# Patient Record
Sex: MALE | Race: BLACK OR AFRICAN AMERICAN | NOT HISPANIC OR LATINO | URBAN - METROPOLITAN AREA
[De-identification: names, ages, dates, MRNs, and addresses within clinical notes are randomized per-mention and may not be internally consistent; named-entity substitution may affect disease eponyms.]

---

## 2015-03-17 RX ORDER — INSULIN GLARGINE 100 [IU]/ML
120 INJECTION, SOLUTION SUBCUTANEOUS
Qty: 0 | Refills: 0 | COMMUNITY
Start: 2015-03-17

## 2015-03-17 RX ORDER — CYCLOBENZAPRINE HYDROCHLORIDE 10 MG/1
1 TABLET, FILM COATED ORAL
Qty: 0 | Refills: 0 | COMMUNITY
Start: 2015-03-17

## 2015-03-18 RX ORDER — FUROSEMIDE 40 MG
1 TABLET ORAL
Qty: 0 | Refills: 0 | COMMUNITY
Start: 2015-03-18

## 2018-03-09 ENCOUNTER — INPATIENT (INPATIENT)
Facility: HOSPITAL | Age: 73
LOS: 14 days | DRG: 208 | End: 2018-03-24
Attending: INTERNAL MEDICINE | Admitting: INTERNAL MEDICINE
Payer: MEDICARE

## 2018-03-09 VITALS
OXYGEN SATURATION: 98 % | DIASTOLIC BLOOD PRESSURE: 111 MMHG | HEART RATE: 123 BPM | RESPIRATION RATE: 16 BRPM | SYSTOLIC BLOOD PRESSURE: 183 MMHG

## 2018-03-09 DIAGNOSIS — R41.82 ALTERED MENTAL STATUS, UNSPECIFIED: ICD-10-CM

## 2018-03-09 LAB
ALBUMIN SERPL ELPH-MCNC: 3.6 G/DL — SIGNIFICANT CHANGE UP (ref 3.3–5.2)
ALP SERPL-CCNC: 105 U/L — SIGNIFICANT CHANGE UP (ref 40–120)
ALT FLD-CCNC: 23 U/L — SIGNIFICANT CHANGE UP
ANION GAP SERPL CALC-SCNC: 14 MMOL/L — SIGNIFICANT CHANGE UP (ref 5–17)
APTT BLD: 31.8 SEC — SIGNIFICANT CHANGE UP (ref 27.5–37.4)
AST SERPL-CCNC: 37 U/L — SIGNIFICANT CHANGE UP
BASOPHILS # BLD AUTO: 0.1 K/UL — SIGNIFICANT CHANGE UP (ref 0–0.2)
BASOPHILS NFR BLD AUTO: 0.6 % — SIGNIFICANT CHANGE UP (ref 0–2)
BILIRUB SERPL-MCNC: 1 MG/DL — SIGNIFICANT CHANGE UP (ref 0.4–2)
BUN SERPL-MCNC: 55 MG/DL — HIGH (ref 8–20)
CALCIUM SERPL-MCNC: 9.4 MG/DL — SIGNIFICANT CHANGE UP (ref 8.6–10.2)
CHLORIDE SERPL-SCNC: 88 MMOL/L — LOW (ref 98–107)
CK MB CFR SERPL CALC: 6.8 NG/ML — HIGH (ref 0–6.7)
CK SERPL-CCNC: 176 U/L — SIGNIFICANT CHANGE UP (ref 30–200)
CO2 SERPL-SCNC: 29 MMOL/L — SIGNIFICANT CHANGE UP (ref 22–29)
CREAT SERPL-MCNC: 2.49 MG/DL — HIGH (ref 0.5–1.3)
EOSINOPHIL # BLD AUTO: 0.4 K/UL — SIGNIFICANT CHANGE UP (ref 0–0.5)
EOSINOPHIL NFR BLD AUTO: 4 % — SIGNIFICANT CHANGE UP (ref 0–5)
GAS PNL BLDA: SIGNIFICANT CHANGE UP
GLUCOSE BLDC GLUCOMTR-MCNC: 408 MG/DL — HIGH (ref 70–99)
GLUCOSE SERPL-MCNC: 445 MG/DL — HIGH (ref 70–115)
HCT VFR BLD CALC: 55.5 % — HIGH (ref 42–52)
HGB BLD-MCNC: 18.8 G/DL — HIGH (ref 14–18)
INR BLD: 1.08 RATIO — SIGNIFICANT CHANGE UP (ref 0.88–1.16)
LYMPHOCYTES # BLD AUTO: 2.4 K/UL — SIGNIFICANT CHANGE UP (ref 1–4.8)
LYMPHOCYTES # BLD AUTO: 25.5 % — SIGNIFICANT CHANGE UP (ref 20–55)
MCHC RBC-ENTMCNC: 29.4 PG — SIGNIFICANT CHANGE UP (ref 27–31)
MCHC RBC-ENTMCNC: 33.9 G/DL — SIGNIFICANT CHANGE UP (ref 32–36)
MCV RBC AUTO: 86.7 FL — SIGNIFICANT CHANGE UP (ref 80–94)
MONOCYTES # BLD AUTO: 0.8 K/UL — SIGNIFICANT CHANGE UP (ref 0–0.8)
MONOCYTES NFR BLD AUTO: 8.3 % — SIGNIFICANT CHANGE UP (ref 3–10)
NEUTROPHILS # BLD AUTO: 5.8 K/UL — SIGNIFICANT CHANGE UP (ref 1.8–8)
NEUTROPHILS NFR BLD AUTO: 60.6 % — SIGNIFICANT CHANGE UP (ref 37–73)
PLATELET # BLD AUTO: 229 K/UL — SIGNIFICANT CHANGE UP (ref 150–400)
POTASSIUM SERPL-MCNC: 5.6 MMOL/L — HIGH (ref 3.5–5.3)
POTASSIUM SERPL-SCNC: 5.6 MMOL/L — HIGH (ref 3.5–5.3)
PROT SERPL-MCNC: 7.7 G/DL — SIGNIFICANT CHANGE UP (ref 6.6–8.7)
PROTHROM AB SERPL-ACNC: 11.9 SEC — SIGNIFICANT CHANGE UP (ref 9.8–12.7)
RBC # BLD: 6.4 M/UL — HIGH (ref 4.6–6.2)
RBC # FLD: 14.6 % — SIGNIFICANT CHANGE UP (ref 11–15.6)
SODIUM SERPL-SCNC: 131 MMOL/L — LOW (ref 135–145)
TROPONIN T SERPL-MCNC: 0.07 NG/ML — HIGH (ref 0–0.06)
WBC # BLD: 9.6 K/UL — SIGNIFICANT CHANGE UP (ref 4.8–10.8)
WBC # FLD AUTO: 9.6 K/UL — SIGNIFICANT CHANGE UP (ref 4.8–10.8)

## 2018-03-09 PROCEDURE — 70450 CT HEAD/BRAIN W/O DYE: CPT | Mod: 26

## 2018-03-09 PROCEDURE — 99291 CRITICAL CARE FIRST HOUR: CPT

## 2018-03-09 PROCEDURE — 71045 X-RAY EXAM CHEST 1 VIEW: CPT | Mod: 26

## 2018-03-09 PROCEDURE — 62270 DX LMBR SPI PNXR: CPT

## 2018-03-09 PROCEDURE — 99291 CRITICAL CARE FIRST HOUR: CPT | Mod: 25

## 2018-03-09 RX ADMIN — Medication 2 MILLIGRAM(S): at 22:05

## 2018-03-09 RX ADMIN — Medication 2 MILLIGRAM(S): at 21:50

## 2018-03-09 NOTE — STROKE CODE NOTE - SUBJECTIVE
73 years old man was last seen to be normal at around 5:30 to 6 PM when he had a dinner with his wife (after she returned from her own medical testing). She reports to have a normal conversation with him at that time. After having a dinner, he laid down on the couch and probably fell asleep. At around 7 or 8 PM (she was not exactly sure about that time) when his wife went to check on him, she found him laying on the couch with eyes open but he was not responding to verbal stimuli and was not making an eye contact. After calling her son, she called 911, and he was subsequently transferred to [facility] ED for further evaluation. On repeated prompting, his wife reports that she saw him completely normal at 6 PM and did not have any conversation with him since then. He was also not reported to get up to use in the bathroom since then. As per his wife, he was not seen to be normal by anyone since 6 PM.

## 2018-03-09 NOTE — STROKE CODE NOTE - PROBLEM SELECTOR PLAN 1
Plan:  - Not a candidate for IV tPA as he was outside the time window.  - CTA head and neck to evaluate for intracranial and extracranial cerebral vasculature to look for any evidence of proximal large vessel intracranial occlusion to determine his candidacy for mechanical embolectomy. His creatinine in the ED he is reported to be 2.49. Risks versus benefits of undergoing CTA head and neck in patients with renal dysfunction including need for future hemodialysis were discussed with his wife in detail. Considering the concerns for large vessel intracranial occlusion and potential treatment in the form of mechanical embolectomy, which could only be decided based on CTA head and neck results (not possible to perform MRA due to MRI non-compatible pacemaker as per his wife), benefits for undergoing CTA head and neck would likely outweigh potential risks  - Continue with IV hydration as tolerated for renal protection and consider nephrologist consultation   - Further evaluation and management as per results of CTA head and neck  - Try to minimize sedation/mind altering medication in order to perform neurological examination  - Allow permissive HTN up to 220/110 mmHg for first 24-48 hours followed by gradual normotension   - Aspirin for secondary stroke prevention   - Atorvastatin 80 mg after establishing enteral access or passing swallow evaluation  - TTE with bubble study and telemetry to screen for possible cardiac source of embolism  - LDL and HbA1C, continue with aggressive vascular risk factors modifications   - PT/OT/Speech and swallow evaluation     Above mentioned plan was discussed with patient, available family member and [facility] ED MD in detail. All the questions were answered and concerns were addressed.

## 2018-03-09 NOTE — ED PROVIDER NOTE - NEUROLOGICAL, MLM
Focal seizures in cheek with gaze to the left. Twitching to left side of face. No withdrawal from pain.

## 2018-03-09 NOTE — STROKE CODE NOTE - OBJECTIVE
NIHSS - NIHSS - 30    Neurological examination was performed under sedation with propofol.    As per the evaluation by [facility] ED MD, his eyes were in the center without any gaze deviation. It was not possible to check for any facial droop, as he was intubated. He was noted to be briskly moving right upper extremity and right lower extremity in response to painful stimuli. No withdrawal to painful stimuli noted in left upper extremity or left lower extremity.

## 2018-03-09 NOTE — ED PROCEDURE NOTE - PROCEDURE ADDITIONAL DETAILS
First attempt preformed by Dr. Aguilar, second attempt by RT, after 3th attempt by RT, anesthesiologist was called and placed tube secondary to difficult airway.

## 2018-03-09 NOTE — ED PROVIDER NOTE - PROGRESS NOTE DETAILS
Pt wife states she came home around 1630 and found pt in bed, asleep. Per wife, pt appeared tired and became unresponsive around approximately 1830. pt evaluated by dr carter of neurology eicu ; pt not a candidate for mri because of pacemaker; pt to have ct angio of neck and head for evaluation for possible interventional procedure; case discussed with wife who agrees consent for ct angio of head and neck ; risks and benefits discussed with family including worsening kidney function;

## 2018-03-09 NOTE — ED ADULT NURSE REASSESSMENT NOTE - NS ED NURSE REASSESS COMMENT FT1
Unsecured airway at this time, Anesthesia paged STAT for intubation. Pt currently sating 94% with 100% 02 via BVM. CT scan on hold for Code Stroke protocol.

## 2018-03-09 NOTE — ED PROVIDER NOTE - NS_ ATTENDINGSCRIBEDETAILS _ED_A_ED_FT
. I, Arpan Aguilar, performed the initial face to face bedside interview with this patient regarding history of present illness, review of symptoms and relevant past medical, social and family history.  I completed an independent physical examination.    The history, relevant review of systems, past medical and surgical history, medical decision making, and physical examination was documented by the scribe in my presence and I attest to the accuracy of the documentation.

## 2018-03-09 NOTE — ED ADULT TRIAGE NOTE - CHIEF COMPLAINT QUOTE
patient biba altered mental status, as per ems patient last seen normal 45 minutes ago, patient unresponsive at this time, seizure like activity noted, Dr. Aguilar at bedside for eval,

## 2018-03-09 NOTE — CHART NOTE - NSCHARTNOTEFT_GEN_A_CORE
Accompanied ED RT to ambulance triage. Pt unresponsive, SAT: 97% per BVM, ordered to intubate per MD Aguliar for airway protection .ED  MD failed first intubation attempt, RT second attempt failed, (no cord visualization). Anesthesia called, passed ETT successfully on second attempt. ETT secured and patient placed on [facility] vent, CXR pending.

## 2018-03-09 NOTE — ED PROVIDER NOTE - SKIN, MLM
Multiple skin tears to LE and feet, Skin otherwise normal color for race, warm, dry and intact. No evidence of rash.

## 2018-03-09 NOTE — ED PROVIDER NOTE - OBJECTIVE STATEMENT
72 y/o male with PMHx HTN and DM presents to the ED via EMS for evaluation of AMS, onset 45 minutes PTA. Family states pt became unresponsive around 2110. Per EMS, noted to have an episode of mouth shaking, gazing to left, possible focal seizure. EMS states sugar was 460. Unable to obtain HPI secondary to AMS.

## 2018-03-09 NOTE — STROKE CODE NOTE - ASSESSMENT/PLAN
73 years old man with multiple vascular risk factors including age, and diabetes is evaluated at [facility] ED for symptoms concerning for stroke. He was seen to be last normal at around 6 PM before he fell asleep on the couch. Few hours later, when his wife went to check on him, she found him laying on the couch with eyes open and not responding to her. He was brought to [facility] ED for further evaluation, where he was noted to have left facial twitching and eyes deviation to the left side. He was intubated for airway protection and was started on propofol. Neurological examination was limited due to being on propofol but grossly showed reduced withdrawal to painful stimuli in the left arm and left leg. CT brain on admission did not show any evidence of acute infarct or hemorrhage.    Impression:  Cerebral embolism with cerebral infarction. Probable right MCA distribution stroke, presenting as symptomatic seizures - likely etiology being cerebral embolism from undetermined source

## 2018-03-09 NOTE — ED PROCEDURE NOTE - NS_ ATTENDINGSCRIBEDETAILS _ED_A_ED_FT
I, Arpan Aguilar, performed the initial face to face bedside interview with this patient regarding history of present illness, review of symptoms and relevant past medical, social and family history.  I completed an independent physical examination.   The history, relevant review of systems, past medical and surgical history, medical decision making, and physical examination was documented by the scribe in my presence and I attest to the accuracy of the documentation.
I, Arpan Aguilar, performed the initial face to face bedside interview with this patient regarding history of present illness, review of symptoms and relevant past medical, social and family history.  I completed an independent physical examination.    The history, relevant review of systems, past medical and surgical history, medical decision making, and physical examination was documented by the scribe in my presence and I attest to the accuracy of the documentation.

## 2018-03-09 NOTE — ED PROCEDURE NOTE - CPROC ED POST PROC CARE GUIDE1
Instructed patient/caregiver to follow-up with primary care physician./Instructed patient/caregiver regarding signs and symptoms of infection./Verbal/written post procedure instructions were given to patient/caregiver.
Verbal/written post procedure instructions were given to patient/caregiver.

## 2018-03-09 NOTE — ED ADULT NURSE NOTE - OBJECTIVE STATEMENT
Pt received in Ambulance triage, code team 2 called respiratory and Dr. Aguilar at the bedside. Pt with last known well at 21:10. Pt presents to ED with FS of 466, unresponsive, and with a left gaze noted. Code stroke initiated. Pt received in Ambulance triage, code team 2 called respiratory and Dr. Aguilar at the bedside. As per Dr. Aguilar, pt with left side of face focal seizures and a lefward gaze is noted. As per family, pt with last known well at 21:10. Pt with FS of 466. Code stroke initiated.

## 2018-03-10 DIAGNOSIS — I63.411 CEREBRAL INFARCTION DUE TO EMBOLISM OF RIGHT MIDDLE CEREBRAL ARTERY: ICD-10-CM

## 2018-03-10 DIAGNOSIS — I10 ESSENTIAL (PRIMARY) HYPERTENSION: ICD-10-CM

## 2018-03-10 DIAGNOSIS — J96.01 ACUTE RESPIRATORY FAILURE WITH HYPOXIA: ICD-10-CM

## 2018-03-10 DIAGNOSIS — G93.40 ENCEPHALOPATHY, UNSPECIFIED: ICD-10-CM

## 2018-03-10 DIAGNOSIS — R73.9 HYPERGLYCEMIA, UNSPECIFIED: ICD-10-CM

## 2018-03-10 DIAGNOSIS — R56.9 UNSPECIFIED CONVULSIONS: ICD-10-CM

## 2018-03-10 DIAGNOSIS — N17.9 ACUTE KIDNEY FAILURE, UNSPECIFIED: ICD-10-CM

## 2018-03-10 LAB
AMPHET UR-MCNC: NEGATIVE — SIGNIFICANT CHANGE UP
ANION GAP SERPL CALC-SCNC: 18 MMOL/L — HIGH (ref 5–17)
APPEARANCE CSF: CLEAR — SIGNIFICANT CHANGE UP
BARBITURATES UR SCN-MCNC: NEGATIVE — SIGNIFICANT CHANGE UP
BENZODIAZ UR-MCNC: NEGATIVE — SIGNIFICANT CHANGE UP
BUN SERPL-MCNC: 52 MG/DL — HIGH (ref 8–20)
CALCIUM SERPL-MCNC: 9.8 MG/DL — SIGNIFICANT CHANGE UP (ref 8.6–10.2)
CHLORIDE SERPL-SCNC: 91 MMOL/L — LOW (ref 98–107)
CK MB CFR SERPL CALC: 10.2 NG/ML — HIGH (ref 0–6.7)
CK SERPL-CCNC: 186 U/L — SIGNIFICANT CHANGE UP (ref 30–200)
CO2 SERPL-SCNC: 26 MMOL/L — SIGNIFICANT CHANGE UP (ref 22–29)
COCAINE METAB.OTHER UR-MCNC: NEGATIVE — SIGNIFICANT CHANGE UP
COLOR CSF: SIGNIFICANT CHANGE UP
CREAT SERPL-MCNC: 2.28 MG/DL — HIGH (ref 0.5–1.3)
CSF PCR RESULT: SIGNIFICANT CHANGE UP
GLUCOSE BLDC GLUCOMTR-MCNC: 101 MG/DL — HIGH (ref 70–99)
GLUCOSE BLDC GLUCOMTR-MCNC: 115 MG/DL — HIGH (ref 70–99)
GLUCOSE BLDC GLUCOMTR-MCNC: 118 MG/DL — HIGH (ref 70–99)
GLUCOSE BLDC GLUCOMTR-MCNC: 129 MG/DL — HIGH (ref 70–99)
GLUCOSE BLDC GLUCOMTR-MCNC: 136 MG/DL — HIGH (ref 70–99)
GLUCOSE BLDC GLUCOMTR-MCNC: 142 MG/DL — HIGH (ref 70–99)
GLUCOSE BLDC GLUCOMTR-MCNC: 155 MG/DL — HIGH (ref 70–99)
GLUCOSE BLDC GLUCOMTR-MCNC: 179 MG/DL — HIGH (ref 70–99)
GLUCOSE BLDC GLUCOMTR-MCNC: 211 MG/DL — HIGH (ref 70–99)
GLUCOSE BLDC GLUCOMTR-MCNC: 216 MG/DL — HIGH (ref 70–99)
GLUCOSE BLDC GLUCOMTR-MCNC: 233 MG/DL — HIGH (ref 70–99)
GLUCOSE BLDC GLUCOMTR-MCNC: 268 MG/DL — HIGH (ref 70–99)
GLUCOSE BLDC GLUCOMTR-MCNC: 269 MG/DL — HIGH (ref 70–99)
GLUCOSE BLDC GLUCOMTR-MCNC: 293 MG/DL — HIGH (ref 70–99)
GLUCOSE BLDC GLUCOMTR-MCNC: 320 MG/DL — HIGH (ref 70–99)
GLUCOSE BLDC GLUCOMTR-MCNC: 327 MG/DL — HIGH (ref 70–99)
GLUCOSE BLDC GLUCOMTR-MCNC: 346 MG/DL — HIGH (ref 70–99)
GLUCOSE BLDC GLUCOMTR-MCNC: 71 MG/DL — SIGNIFICANT CHANGE UP (ref 70–99)
GLUCOSE BLDC GLUCOMTR-MCNC: 80 MG/DL — SIGNIFICANT CHANGE UP (ref 70–99)
GLUCOSE BLDC GLUCOMTR-MCNC: 95 MG/DL — SIGNIFICANT CHANGE UP (ref 70–99)
GLUCOSE CSF-MCNC: 234 MG/DL — HIGH (ref 40–70)
GLUCOSE SERPL-MCNC: 355 MG/DL — HIGH (ref 70–115)
GRAM STN FLD: SIGNIFICANT CHANGE UP
HBA1C BLD-MCNC: 8.3 % — HIGH (ref 4–5.6)
HCT VFR BLD CALC: 56.6 % — HIGH (ref 42–52)
HCT VFR BLD CALC: 57 % — HIGH (ref 42–52)
HGB BLD-MCNC: 19.5 G/DL — CRITICAL HIGH (ref 14–18)
HGB BLD-MCNC: 19.6 G/DL — CRITICAL HIGH (ref 14–18)
LABORATORY COMMENT REPORT: SIGNIFICANT CHANGE UP
LACTATE BLDV-MCNC: 2.3 MMOL/L — HIGH (ref 0.5–2)
LYMPHOCYTES # CSF: SIGNIFICANT CHANGE UP % (ref 40–80)
MAGNESIUM SERPL-MCNC: 2 MG/DL — SIGNIFICANT CHANGE UP (ref 1.6–2.6)
MCHC RBC-ENTMCNC: 29.5 PG — SIGNIFICANT CHANGE UP (ref 27–31)
MCHC RBC-ENTMCNC: 29.6 PG — SIGNIFICANT CHANGE UP (ref 27–31)
MCHC RBC-ENTMCNC: 34.4 G/DL — SIGNIFICANT CHANGE UP (ref 32–36)
MCHC RBC-ENTMCNC: 34.5 G/DL — SIGNIFICANT CHANGE UP (ref 32–36)
MCV RBC AUTO: 85.8 FL — SIGNIFICANT CHANGE UP (ref 80–94)
MCV RBC AUTO: 86.1 FL — SIGNIFICANT CHANGE UP (ref 80–94)
METHADONE UR-MCNC: NEGATIVE — SIGNIFICANT CHANGE UP
MONOS+MACROS NFR CSF: SIGNIFICANT CHANGE UP %
NEUTROPHILS # CSF: SIGNIFICANT CHANGE UP %
NRBC NFR CSF: 3 /UL — SIGNIFICANT CHANGE UP (ref 0–5)
OPIATES UR-MCNC: NEGATIVE — SIGNIFICANT CHANGE UP
PCP SPEC-MCNC: SIGNIFICANT CHANGE UP
PCP UR-MCNC: NEGATIVE — SIGNIFICANT CHANGE UP
PHOSPHATE SERPL-MCNC: 2 MG/DL — LOW (ref 2.4–4.7)
PLATELET # BLD AUTO: 199 K/UL — SIGNIFICANT CHANGE UP (ref 150–400)
PLATELET # BLD AUTO: 207 K/UL — SIGNIFICANT CHANGE UP (ref 150–400)
POTASSIUM SERPL-MCNC: 4.3 MMOL/L — SIGNIFICANT CHANGE UP (ref 3.5–5.3)
POTASSIUM SERPL-SCNC: 4.3 MMOL/L — SIGNIFICANT CHANGE UP (ref 3.5–5.3)
PROT CSF-MCNC: 135 MG/DL — HIGH (ref 15–45)
RBC # BLD: 6.6 M/UL — HIGH (ref 4.6–6.2)
RBC # BLD: 6.62 M/UL — HIGH (ref 4.6–6.2)
RBC # CSF: 47 /CMM — HIGH (ref 0–1)
RBC # FLD: 14.3 % — SIGNIFICANT CHANGE UP (ref 11–15.6)
RBC # FLD: 14.3 % — SIGNIFICANT CHANGE UP (ref 11–15.6)
SODIUM SERPL-SCNC: 135 MMOL/L — SIGNIFICANT CHANGE UP (ref 135–145)
SOURCE HSV 1/2: SIGNIFICANT CHANGE UP
SPECIMEN SOURCE: SIGNIFICANT CHANGE UP
THC UR QL: NEGATIVE — SIGNIFICANT CHANGE UP
TROPONIN T SERPL-MCNC: 0.11 NG/ML — HIGH (ref 0–0.06)
TUBE TYPE: SIGNIFICANT CHANGE UP
WBC # BLD: 12.7 K/UL — HIGH (ref 4.8–10.8)
WBC # BLD: 14.8 K/UL — HIGH (ref 4.8–10.8)
WBC # FLD AUTO: 12.7 K/UL — HIGH (ref 4.8–10.8)
WBC # FLD AUTO: 14.8 K/UL — HIGH (ref 4.8–10.8)

## 2018-03-10 PROCEDURE — 99222 1ST HOSP IP/OBS MODERATE 55: CPT

## 2018-03-10 PROCEDURE — 95819 EEG AWAKE AND ASLEEP: CPT | Mod: 26

## 2018-03-10 PROCEDURE — 70496 CT ANGIOGRAPHY HEAD: CPT | Mod: 26

## 2018-03-10 PROCEDURE — 70498 CT ANGIOGRAPHY NECK: CPT | Mod: 26

## 2018-03-10 RX ORDER — LEVETIRACETAM 250 MG/1
750 TABLET, FILM COATED ORAL EVERY 12 HOURS
Qty: 0 | Refills: 0 | Status: DISCONTINUED | OUTPATIENT
Start: 2018-03-10 | End: 2018-03-16

## 2018-03-10 RX ORDER — PANTOPRAZOLE SODIUM 20 MG/1
40 TABLET, DELAYED RELEASE ORAL DAILY
Qty: 0 | Refills: 0 | Status: DISCONTINUED | OUTPATIENT
Start: 2018-03-10 | End: 2018-03-12

## 2018-03-10 RX ORDER — VANCOMYCIN HCL 1 G
1250 VIAL (EA) INTRAVENOUS EVERY 24 HOURS
Qty: 0 | Refills: 0 | Status: DISCONTINUED | OUTPATIENT
Start: 2018-03-11 | End: 2018-03-11

## 2018-03-10 RX ORDER — ACYCLOVIR SODIUM 500 MG
880 VIAL (EA) INTRAVENOUS EVERY 12 HOURS
Qty: 0 | Refills: 0 | Status: DISCONTINUED | OUTPATIENT
Start: 2018-03-10 | End: 2018-03-16

## 2018-03-10 RX ORDER — SODIUM CHLORIDE 9 MG/ML
1000 INJECTION, SOLUTION INTRAVENOUS
Qty: 0 | Refills: 0 | Status: DISCONTINUED | OUTPATIENT
Start: 2018-03-10 | End: 2018-03-15

## 2018-03-10 RX ORDER — ACYCLOVIR SODIUM 500 MG
VIAL (EA) INTRAVENOUS
Qty: 0 | Refills: 0 | Status: DISCONTINUED | OUTPATIENT
Start: 2018-03-10 | End: 2018-03-16

## 2018-03-10 RX ORDER — INSULIN HUMAN 100 [IU]/ML
6 INJECTION, SOLUTION SUBCUTANEOUS
Qty: 100 | Refills: 0 | Status: DISCONTINUED | OUTPATIENT
Start: 2018-03-10 | End: 2018-03-13

## 2018-03-10 RX ORDER — HEPARIN SODIUM 5000 [USP'U]/ML
5000 INJECTION INTRAVENOUS; SUBCUTANEOUS EVERY 12 HOURS
Qty: 0 | Refills: 0 | Status: DISCONTINUED | OUTPATIENT
Start: 2018-03-10 | End: 2018-03-10

## 2018-03-10 RX ORDER — ACYCLOVIR SODIUM 500 MG
VIAL (EA) INTRAVENOUS
Qty: 0 | Refills: 0 | Status: DISCONTINUED | OUTPATIENT
Start: 2018-03-10 | End: 2018-03-10

## 2018-03-10 RX ORDER — INSULIN HUMAN 100 [IU]/ML
8 INJECTION, SOLUTION SUBCUTANEOUS ONCE
Qty: 0 | Refills: 0 | Status: COMPLETED | OUTPATIENT
Start: 2018-03-10 | End: 2018-03-10

## 2018-03-10 RX ORDER — CEFTRIAXONE 500 MG/1
INJECTION, POWDER, FOR SOLUTION INTRAMUSCULAR; INTRAVENOUS
Qty: 0 | Refills: 0 | Status: DISCONTINUED | OUTPATIENT
Start: 2018-03-10 | End: 2018-03-11

## 2018-03-10 RX ORDER — AMPICILLIN TRIHYDRATE 250 MG
2 CAPSULE ORAL ONCE
Qty: 0 | Refills: 0 | Status: COMPLETED | OUTPATIENT
Start: 2018-03-10 | End: 2018-03-10

## 2018-03-10 RX ORDER — CEFTRIAXONE 500 MG/1
2 INJECTION, POWDER, FOR SOLUTION INTRAMUSCULAR; INTRAVENOUS EVERY 24 HOURS
Qty: 0 | Refills: 0 | Status: DISCONTINUED | OUTPATIENT
Start: 2018-03-11 | End: 2018-03-11

## 2018-03-10 RX ORDER — VANCOMYCIN HCL 1 G
VIAL (EA) INTRAVENOUS
Qty: 0 | Refills: 0 | Status: DISCONTINUED | OUTPATIENT
Start: 2018-03-10 | End: 2018-03-10

## 2018-03-10 RX ORDER — GLUCAGON INJECTION, SOLUTION 0.5 MG/.1ML
1 INJECTION, SOLUTION SUBCUTANEOUS ONCE
Qty: 0 | Refills: 0 | Status: DISCONTINUED | OUTPATIENT
Start: 2018-03-10 | End: 2018-03-24

## 2018-03-10 RX ORDER — AMPICILLIN TRIHYDRATE 250 MG
CAPSULE ORAL
Qty: 0 | Refills: 0 | Status: DISCONTINUED | OUTPATIENT
Start: 2018-03-10 | End: 2018-03-11

## 2018-03-10 RX ORDER — DEXTROSE 50 % IN WATER 50 %
12.5 SYRINGE (ML) INTRAVENOUS ONCE
Qty: 0 | Refills: 0 | Status: DISCONTINUED | OUTPATIENT
Start: 2018-03-10 | End: 2018-03-24

## 2018-03-10 RX ORDER — VANCOMYCIN HCL 1 G
1250 VIAL (EA) INTRAVENOUS ONCE
Qty: 0 | Refills: 0 | Status: COMPLETED | OUTPATIENT
Start: 2018-03-10 | End: 2018-03-10

## 2018-03-10 RX ORDER — SODIUM CHLORIDE 9 MG/ML
1000 INJECTION, SOLUTION INTRAVENOUS ONCE
Qty: 0 | Refills: 0 | Status: COMPLETED | OUTPATIENT
Start: 2018-03-10 | End: 2018-03-10

## 2018-03-10 RX ORDER — CEFTRIAXONE 500 MG/1
2 INJECTION, POWDER, FOR SOLUTION INTRAMUSCULAR; INTRAVENOUS ONCE
Qty: 0 | Refills: 0 | Status: COMPLETED | OUTPATIENT
Start: 2018-03-10 | End: 2018-03-10

## 2018-03-10 RX ORDER — LEVETIRACETAM 250 MG/1
1000 TABLET, FILM COATED ORAL ONCE
Qty: 0 | Refills: 0 | Status: COMPLETED | OUTPATIENT
Start: 2018-03-10 | End: 2018-03-10

## 2018-03-10 RX ORDER — DEXTROSE 50 % IN WATER 50 %
25 SYRINGE (ML) INTRAVENOUS ONCE
Qty: 0 | Refills: 0 | Status: DISCONTINUED | OUTPATIENT
Start: 2018-03-10 | End: 2018-03-24

## 2018-03-10 RX ORDER — ACYCLOVIR SODIUM 500 MG
880 VIAL (EA) INTRAVENOUS ONCE
Qty: 0 | Refills: 0 | Status: COMPLETED | OUTPATIENT
Start: 2018-03-10 | End: 2018-03-10

## 2018-03-10 RX ORDER — CHLORHEXIDINE GLUCONATE 213 G/1000ML
15 SOLUTION TOPICAL
Qty: 0 | Refills: 0 | Status: DISCONTINUED | OUTPATIENT
Start: 2018-03-10 | End: 2018-03-11

## 2018-03-10 RX ORDER — DEXMEDETOMIDINE HYDROCHLORIDE IN 0.9% SODIUM CHLORIDE 4 UG/ML
0.3 INJECTION INTRAVENOUS
Qty: 200 | Refills: 0 | Status: DISCONTINUED | OUTPATIENT
Start: 2018-03-10 | End: 2018-03-11

## 2018-03-10 RX ORDER — AMPICILLIN TRIHYDRATE 250 MG
2 CAPSULE ORAL EVERY 6 HOURS
Qty: 0 | Refills: 0 | Status: DISCONTINUED | OUTPATIENT
Start: 2018-03-10 | End: 2018-03-11

## 2018-03-10 RX ORDER — VANCOMYCIN HCL 1 G
VIAL (EA) INTRAVENOUS
Qty: 0 | Refills: 0 | Status: DISCONTINUED | OUTPATIENT
Start: 2018-03-10 | End: 2018-03-11

## 2018-03-10 RX ORDER — FENTANYL CITRATE 50 UG/ML
50 INJECTION INTRAVENOUS
Qty: 0 | Refills: 0 | Status: DISCONTINUED | OUTPATIENT
Start: 2018-03-10 | End: 2018-03-15

## 2018-03-10 RX ORDER — DEXTROSE 50 % IN WATER 50 %
1 SYRINGE (ML) INTRAVENOUS ONCE
Qty: 0 | Refills: 0 | Status: DISCONTINUED | OUTPATIENT
Start: 2018-03-10 | End: 2018-03-24

## 2018-03-10 RX ORDER — FENTANYL CITRATE 50 UG/ML
100 INJECTION INTRAVENOUS ONCE
Qty: 0 | Refills: 0 | Status: DISCONTINUED | OUTPATIENT
Start: 2018-03-10 | End: 2018-03-10

## 2018-03-10 RX ORDER — FUROSEMIDE 40 MG
40 TABLET ORAL EVERY 12 HOURS
Qty: 0 | Refills: 0 | Status: DISCONTINUED | OUTPATIENT
Start: 2018-03-10 | End: 2018-03-10

## 2018-03-10 RX ORDER — SODIUM CHLORIDE 9 MG/ML
2000 INJECTION, SOLUTION INTRAVENOUS ONCE
Qty: 0 | Refills: 0 | Status: COMPLETED | OUTPATIENT
Start: 2018-03-10 | End: 2018-03-10

## 2018-03-10 RX ORDER — SODIUM CHLORIDE 9 MG/ML
1000 INJECTION, SOLUTION INTRAVENOUS
Qty: 0 | Refills: 0 | Status: DISCONTINUED | OUTPATIENT
Start: 2018-03-10 | End: 2018-03-24

## 2018-03-10 RX ORDER — PROPOFOL 10 MG/ML
20 INJECTION, EMULSION INTRAVENOUS
Qty: 1000 | Refills: 0 | Status: DISCONTINUED | OUTPATIENT
Start: 2018-03-10 | End: 2018-03-10

## 2018-03-10 RX ADMIN — FENTANYL CITRATE 100 MICROGRAM(S): 50 INJECTION INTRAVENOUS at 09:02

## 2018-03-10 RX ADMIN — SODIUM CHLORIDE 1000 MILLILITER(S): 9 INJECTION, SOLUTION INTRAVENOUS at 08:08

## 2018-03-10 RX ADMIN — Medication 2 MILLIGRAM(S): at 03:30

## 2018-03-10 RX ADMIN — CHLORHEXIDINE GLUCONATE 15 MILLILITER(S): 213 SOLUTION TOPICAL at 18:02

## 2018-03-10 RX ADMIN — DEXMEDETOMIDINE HYDROCHLORIDE IN 0.9% SODIUM CHLORIDE 9.18 MICROGRAM(S)/KG/HR: 4 INJECTION INTRAVENOUS at 20:57

## 2018-03-10 RX ADMIN — DEXMEDETOMIDINE HYDROCHLORIDE IN 0.9% SODIUM CHLORIDE 9.18 MICROGRAM(S)/KG/HR: 4 INJECTION INTRAVENOUS at 16:42

## 2018-03-10 RX ADMIN — Medication 216 GRAM(S): at 10:48

## 2018-03-10 RX ADMIN — Medication 2 MILLIGRAM(S): at 04:20

## 2018-03-10 RX ADMIN — LEVETIRACETAM 400 MILLIGRAM(S): 250 TABLET, FILM COATED ORAL at 15:43

## 2018-03-10 RX ADMIN — INSULIN HUMAN 8 UNIT(S): 100 INJECTION, SOLUTION SUBCUTANEOUS at 00:52

## 2018-03-10 RX ADMIN — LEVETIRACETAM 400 MILLIGRAM(S): 250 TABLET, FILM COATED ORAL at 04:37

## 2018-03-10 RX ADMIN — DEXMEDETOMIDINE HYDROCHLORIDE IN 0.9% SODIUM CHLORIDE 9.18 MICROGRAM(S)/KG/HR: 4 INJECTION INTRAVENOUS at 06:37

## 2018-03-10 RX ADMIN — FENTANYL CITRATE 50 MICROGRAM(S): 50 INJECTION INTRAVENOUS at 20:58

## 2018-03-10 RX ADMIN — CHLORHEXIDINE GLUCONATE 15 MILLILITER(S): 213 SOLUTION TOPICAL at 06:40

## 2018-03-10 RX ADMIN — CEFTRIAXONE 100 GRAM(S): 500 INJECTION, POWDER, FOR SOLUTION INTRAMUSCULAR; INTRAVENOUS at 06:38

## 2018-03-10 RX ADMIN — SODIUM CHLORIDE 2000 MILLILITER(S): 9 INJECTION, SOLUTION INTRAVENOUS at 06:40

## 2018-03-10 RX ADMIN — FENTANYL CITRATE 100 MICROGRAM(S): 50 INJECTION INTRAVENOUS at 09:10

## 2018-03-10 RX ADMIN — Medication 166.67 MILLIGRAM(S): at 10:49

## 2018-03-10 RX ADMIN — Medication 216 GRAM(S): at 23:45

## 2018-03-10 RX ADMIN — PANTOPRAZOLE SODIUM 40 MILLIGRAM(S): 20 TABLET, DELAYED RELEASE ORAL at 12:58

## 2018-03-10 RX ADMIN — SODIUM CHLORIDE 100 MILLILITER(S): 9 INJECTION, SOLUTION INTRAVENOUS at 18:02

## 2018-03-10 RX ADMIN — SODIUM CHLORIDE 100 MILLILITER(S): 9 INJECTION, SOLUTION INTRAVENOUS at 23:14

## 2018-03-10 RX ADMIN — Medication 267.6 MILLIGRAM(S): at 06:38

## 2018-03-10 RX ADMIN — INSULIN HUMAN 6 UNIT(S)/HR: 100 INJECTION, SOLUTION SUBCUTANEOUS at 06:38

## 2018-03-10 RX ADMIN — Medication 2 MILLIGRAM(S): at 09:50

## 2018-03-10 RX ADMIN — DEXMEDETOMIDINE HYDROCHLORIDE IN 0.9% SODIUM CHLORIDE 9.18 MICROGRAM(S)/KG/HR: 4 INJECTION INTRAVENOUS at 10:54

## 2018-03-10 RX ADMIN — Medication 267.6 MILLIGRAM(S): at 18:53

## 2018-03-10 RX ADMIN — Medication 216 GRAM(S): at 18:02

## 2018-03-10 RX ADMIN — DEXMEDETOMIDINE HYDROCHLORIDE IN 0.9% SODIUM CHLORIDE 9.18 MICROGRAM(S)/KG/HR: 4 INJECTION INTRAVENOUS at 23:45

## 2018-03-10 NOTE — H&P ADULT - PROBLEM SELECTOR PLAN 8
Uncontrolled hyperglycemia in the setting of acute CVA  Start on insulin infusion, monitor FS q1h and titrate drip per MICU protocol

## 2018-03-10 NOTE — EEG REPORT - NS EEG TEXT BOX
[facility]  Report of Routine EEG with Video    [facility]: [address] [place] [address], Phone: [phone]  [facility]: [id number], [place] [address], Phone: [phone]  Office: [address] [address], [place] [address], Phone: [phone]    Patient Name: Reynaldo Clemens    Age: 73 y  : 1945  Patient ID: -, MRN #: -, Location: -  Referring Physician: Vickie Gunderson    EEG #: [id number]  Study Date: 3/10/2018		    Technical Information:					  On Instrument: -  Placement and Labeling of Electrodes:  The EEG was performed utilizing 20 channels referential EEG connections (coronal over temporal over parasagittal montage) using all standard 10-20 electrode placements with EKG.  Recording was at a sampling rate of 256 samples per second per channel.  Time synchronized digital video recording was done simultaneously with EEG recording.  A low light infrared camera was used for low light recording.  Antonio and seizure detection algorithms were utilized.    History:   Seizures    Medication  Insulin  Keppra  precedex  Ativan    Study Interpretation:    FINDINGS:  The background revealed diffuse low amplitude 5 UV mixed frequency theta and beta activity over the left hemisphere without variability or reactivity to external stimuli. Approximately 1 Hz right hemisphere PLEDS were seen continuously with intervening suppression at an approximate frequency of 1HZ.    Background Slowing:  Moderate marked generalized slowing.    Focal Slowing:   Right hemisphere.    Sleep Background:  Normal sleep transients not recorded.    Other Paroxysmal Non-Epileptiform Findings:  None were present.    Interictal Epileptiform Activity:   RFT PLEDs    Events:  Clinical events: None recorded.  Seizures: None recorded.    Activation Procedures:   Hyperventilation was not performed.    Photic stimulation was not performed.    Artifacts:  Intermittent myogenic and movement artifacts were noted.      EEG Impression:  Abnormal EEG in the unresponsive / sedated state due to the presence of:  1.	moderate- marked generalized slowing and suppression  2.	Marked right hemisphere voltage suppression and Right fronto-temporal PLEDs at a frequency of approximately 1 Hz    Clinical Correlation:  The study is consistent with a diffuse encephalopathy likely from sedative medications and an acute right fronto-temporal injury either encephalitic or vascular.       Lb Reyes MD  Director of Neurology, [facility]  Attending Epileptologist and , [facility]

## 2018-03-10 NOTE — H&P ADULT - ATTENDING COMMENTS
I have seen and evaluated the patient independently agree with above    - seizure unclear LP not impressive pcr still pending, continue current antimicrobials  - MV for airway protection  - continue IV fluids for contrast form CTA head  updated family  - continuous EEG monitoring

## 2018-03-10 NOTE — PATIENT PROFILE ADULT. - FUNCTIONAL SCREEN CURRENT LEVEL: SWALLOWING (IF SCORE 2 OR MORE FOR ANY ITEM, CONSULT REHAB SERVICES), MLM)
- Event monitor (2014 and 2017) show baseline rhythm to be sinus rhythm or sinus bradycardia with intraventricular conduction delay    (2) difficulty swallowing liquids/foods

## 2018-03-10 NOTE — H&P ADULT - PROBLEM SELECTOR PLAN 1
CT head neg, CTA neg  Admit to MICU  q1h neuro checks  Lipid profile  HgbA1c  TTE  PT/OT once a candidate  Aspirin / statin therapy  Neuro c/s

## 2018-03-10 NOTE — H&P ADULT - HISTORY OF PRESENT ILLNESS
74 yo male, PMHx unknown by wife, who presented BIBA after being found unresponsive by wife at home. Per pt's wife, she returned home at approximately 16:45, patient was awake and alert, in usual state of health with no complaints. Pt ate dinner then was lying on the couch watching TV. When wife went to check on him, she noted him to be staring off to the left with his eyes open, not responding to verbal stimuli. She called her son, who then called 911. Timeline unclear: pt's wife states last known normal was approx 18:00, however pt's other son Tenzin who lives in the same house states his mother called him to check on the pt after finding him unresponsive around 20:00. Upon arrival to ED, /110, patient was unresponsive to verbal stimuli, and was intubated by Anesthesia for concerns of inability to protect airway. Patient was noted to have L cheek focal twitching, withdrew to noxious stimuli x 4 extremities, however had a deficit on LUE / LLE. A CODE STROKE was called, and tele neurology Dr. Wan was consulted. Initial CT head negative for acute hemorrhage or infarct, no visible MCA sign. After careful history-taking from wife and patient's sons, patient was deemed to be outside of the window for tPA. Patient has a PPM, and per pt's wife, is unable to have an MRI. Decision was made to obtain CTA head/neck despite BEVERLY on CKD, which was negative for acute occlusion. Patient was transferred to MICU for further management. 74 yo male, PMHx DM, HF, HTN, who presented BIBA after being found unresponsive by wife at home. Per pt's wife, she returned home at approximately 16:45, patient was awake and alert, in usual state of health with no complaints. Pt ate dinner then was lying on the couch watching TV. When wife went to check on him, she noted him to be staring off to the left with his eyes open, not responding to verbal stimuli. She called her son, who then called 911. Timeline unclear: pt's wife states last known normal was approx 18:00, however pt's other son Tenzin who lives in the same house states his mother called him to check on the pt after finding him unresponsive around 20:00. Upon arrival to ED, /110, patient was unresponsive to verbal stimuli, and was intubated by Anesthesia for concerns of inability to protect airway and hypoxia. Patient was noted to have L cheek focal twitching, withdrew to noxious stimuli x 4 extremities, however had a deficit on LUE / LLE. A CODE STROKE was called, and tele neurology Dr. Wan was consulted. Initial CT head negative for acute hemorrhage or infarct, no visible MCA sign. After careful history-taking from wife and patient's sons, patient was deemed to be outside of the window for tPA. Patient has a PPM, and per pt's wife, is unable to have an MRI. Decision was made to obtain CTA head/neck despite BEVERLY on CKD, which was negative for acute occlusion. Patient was transferred to MICU for further management. Of note, patient's wife mentioned pt has been home with Shingles over the past 3 weeks.

## 2018-03-10 NOTE — CONSULT NOTE ADULT - SUBJECTIVE AND OBJECTIVE BOX
[facility]                                        Ne[facility] [place]                                  Marco Cox, & Austyn                                      [address]                                           [place], [place], [address]                                                [phone]    HISTORY:    The patient is a 73y Male who was found unresponsive at home and brought to the ER.   By report he was lying on the couch watching TV and his wife went to check on him and found him staring into space and not responding.   BP on arrival in ER was 200/110 and he was intubated and admitted to ICU.   He has had shingles recently.     LP done just prior to my arrival and patient received fentanyl prior to procedure.     PAST MEDICAL & SURGICAL HISTORY:  CHF (congestive heart failure)  HTN (hypertension)  Diabetes  No significant past surgical history      MEDICATION PRIOR TO ADMISSION:  Insulin  Atorvastatin  Allopurinol  Aspirin  Carvedilol  Albuterol-Ipratropium  Pantoprazole  Cyclobenzaprine  Simethicone  Vantin    Allergies  No Known Allergies    SOCIAL HISTORY:  Former smoker.     FAMILY HISTORY:  No pertinent family history in first degree relatives    ROS:  Unobtainable due to patient's condition.     Exam:  Vital Signs Last 24 Hrs  T(F): 97.6 (10 Mar 2018 08:00), Max: 97.8 (09 Mar 2018 22:23)  HR: 81 (10 Mar 2018 10:00) (74 - 124)  BP: 123/81 (10 Mar 2018 10:00) (109/80 - 195/113)  BP(mean): 96 (10 Mar 2018 10:00) (96 - 150)  RR: 16 (10 Mar 2018 10:00) (14 - 35)  SpO2: 96% (10 Mar 2018 10:00) (89% - 100%)  General: On mechanical ventilator.  Carotid bruits absent.     Mental status: Unresponsive to voice.     Cranial nerves: There is no papilledema. Pupils react Symmetrically to light. On oculocephalic maneuvers there is response in the horizontal and vertical planes. Corneal reflexes present.     Motor/Sensory: There is normal bulk and tone.  No movement to stimuli.    Reflexes: trace throughout and plantar responses are flexor.    Cerebellar: Cannot be tested.     LABS:                         19.6   14.8  )-----------( 199                57.0       135  |  91<L>  |  52.0<H>  ----------------------------<  355<H>  4.3   |  26.0  |  2.28<H>    Ca    9.8      10 Mar 2018 04:21  Phos  2.0     03-10  Mg     2.0     03-10    TPro  7.7  /  Alb  3.6  /  TBili  1.0  /  DBili  x   /  AST  37  /  ALT  23  /  AlkPhos  105  03-09    PT/INR - ( 09 Mar 2018 22:16 )   PT: 11.9 sec;   INR: 1.08 ratio    PTT - ( 09 Mar 2018 22:16 )  PTT:31.8 sec    RADIOLOGY & ADDITIONAL STUDIES:  CT head reviewed: There is no acute pathology.

## 2018-03-10 NOTE — H&P ADULT - CRANIAL NERVE
unable to fully assess, patient intubated and sedated  Pupils 4mm bilaterally, equal and midline, no gaze deviation, sluggish  Minimal corneal reflex b/l

## 2018-03-10 NOTE — H&P ADULT - PROBLEM SELECTOR PLAN 5
Patient currently on full ventilatory support.   F/u repeat ABG and will titrate vent settings to maintain SaO2 >90%, and pH >7.25. Lung protective strategy with low tidal volume ventilation and plateau pressure goal < 30.   VAP prophylaxis with Peridex oral care, PPI for stress ulcer prophylaxis.   On propofol to raise seizure threshold, not a candidate for SAT/SBT this AM due to persistent encephalopathy, discussed with RT

## 2018-03-10 NOTE — H&P ADULT - MOTOR
Withdraws briskly to noxious stimuli localizing with RUE, withdraws on RLE  Minimal withdrawal to noxious stimuli on LUE > LLE

## 2018-03-10 NOTE — H&P ADULT - PROBLEM SELECTOR PLAN 3
New onset, CT head neg for hemorrhage or mass  Had multiple seizures in MICU, started on Keppra  Ativan PRN seizure activity  24h EEG

## 2018-03-10 NOTE — H&P ADULT - ASSESSMENT
74 yo male, PMHx DM, HF, HTN, who presented BIBA after being found unresponsive by wife at home, intubated for hypoxemic respiratory failure, r/o acute CVA vs encephalopathy post-ictal state secondary to seizures. Concerns for encephalitis secondary to VZV meningitis in differential. 74 yo male, PMHx DM, HF, HTN, who presented BIBA after being found unresponsive by wife at home, intubated for hypoxemic respiratory failure, r/o acute CVA vs encephalopathy post-ictal state secondary to seizures. Concerns for encephalitis secondary to VZV meningitis in differential.      CC TIME: 90 minutes reviewing charts, labs, imagining studies, collaborating with interdisciplinary team, and discussing plan of care with family at bedside. Case d/w eICU Attending and TeleStroke Neurologist

## 2018-03-10 NOTE — H&P ADULT - PROBLEM SELECTOR PLAN 6
BEVERLY on CKD, also received contrast for CT   Aggressive IV fluid hydration  Continue to monitor renal function and electrolytes  Replace electrolytes as needed  Monitor I&Os, daily weights  Renally dose meds  Avoid nephrotoxic agents

## 2018-03-10 NOTE — H&P ADULT - PROBLEM SELECTOR PLAN 7
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   Unable to reach family to obtain consent for LP at this time, discussed with eICU Attending who recommended to treat empirically and wait to obtain consent for LP from family. Message left for patient's son Alberta to call back.  Will start on empiric antiviral therapy with Acyclovir renally adjusted, and Rocephin for bacterial meningitis

## 2018-03-10 NOTE — H&P ADULT - NSHPSOCIALHISTORY_GEN_ALL_CORE
Lives in basement with wife, son. Former smoker quit 30 years ago, and h/o EtOH use. No illicit drug use

## 2018-03-10 NOTE — H&P ADULT - RS GEN PE MLT RESP DETAILS PC
good air movement/no rhonchi/breath sounds equal/no wheezes/respirations non-labored/no rales/airway patent

## 2018-03-11 DIAGNOSIS — E11.8 TYPE 2 DIABETES MELLITUS WITH UNSPECIFIED COMPLICATIONS: ICD-10-CM

## 2018-03-11 LAB
ALBUMIN SERPL ELPH-MCNC: 3.2 G/DL — LOW (ref 3.3–5.2)
ALP SERPL-CCNC: 89 U/L — SIGNIFICANT CHANGE UP (ref 40–120)
ALT FLD-CCNC: 18 U/L — SIGNIFICANT CHANGE UP
ANION GAP SERPL CALC-SCNC: 17 MMOL/L — SIGNIFICANT CHANGE UP (ref 5–17)
AST SERPL-CCNC: 25 U/L — SIGNIFICANT CHANGE UP
BASOPHILS # BLD AUTO: 0 K/UL — SIGNIFICANT CHANGE UP (ref 0–0.2)
BASOPHILS NFR BLD AUTO: 0.3 % — SIGNIFICANT CHANGE UP (ref 0–2)
BILIRUB SERPL-MCNC: 1 MG/DL — SIGNIFICANT CHANGE UP (ref 0.4–2)
BUN SERPL-MCNC: 47 MG/DL — HIGH (ref 8–20)
CALCIUM SERPL-MCNC: 9 MG/DL — SIGNIFICANT CHANGE UP (ref 8.6–10.2)
CHLORIDE SERPL-SCNC: 95 MMOL/L — LOW (ref 98–107)
CO2 SERPL-SCNC: 26 MMOL/L — SIGNIFICANT CHANGE UP (ref 22–29)
CREAT SERPL-MCNC: 2.27 MG/DL — HIGH (ref 0.5–1.3)
EOSINOPHIL # BLD AUTO: 0.6 K/UL — HIGH (ref 0–0.5)
EOSINOPHIL NFR BLD AUTO: 4.8 % — SIGNIFICANT CHANGE UP (ref 0–6)
GAS PNL BLDA: SIGNIFICANT CHANGE UP
GLUCOSE BLDC GLUCOMTR-MCNC: 105 MG/DL — HIGH (ref 70–99)
GLUCOSE BLDC GLUCOMTR-MCNC: 112 MG/DL — HIGH (ref 70–99)
GLUCOSE BLDC GLUCOMTR-MCNC: 116 MG/DL — HIGH (ref 70–99)
GLUCOSE BLDC GLUCOMTR-MCNC: 118 MG/DL — HIGH (ref 70–99)
GLUCOSE BLDC GLUCOMTR-MCNC: 120 MG/DL — HIGH (ref 70–99)
GLUCOSE BLDC GLUCOMTR-MCNC: 120 MG/DL — HIGH (ref 70–99)
GLUCOSE BLDC GLUCOMTR-MCNC: 122 MG/DL — HIGH (ref 70–99)
GLUCOSE BLDC GLUCOMTR-MCNC: 123 MG/DL — HIGH (ref 70–99)
GLUCOSE BLDC GLUCOMTR-MCNC: 124 MG/DL — HIGH (ref 70–99)
GLUCOSE BLDC GLUCOMTR-MCNC: 143 MG/DL — HIGH (ref 70–99)
GLUCOSE BLDC GLUCOMTR-MCNC: 162 MG/DL — HIGH (ref 70–99)
GLUCOSE BLDC GLUCOMTR-MCNC: 204 MG/DL — HIGH (ref 70–99)
GLUCOSE BLDC GLUCOMTR-MCNC: 206 MG/DL — HIGH (ref 70–99)
GLUCOSE BLDC GLUCOMTR-MCNC: 221 MG/DL — HIGH (ref 70–99)
GLUCOSE BLDC GLUCOMTR-MCNC: 229 MG/DL — HIGH (ref 70–99)
GLUCOSE BLDC GLUCOMTR-MCNC: 230 MG/DL — HIGH (ref 70–99)
GLUCOSE BLDC GLUCOMTR-MCNC: 251 MG/DL — HIGH (ref 70–99)
GLUCOSE BLDC GLUCOMTR-MCNC: 254 MG/DL — HIGH (ref 70–99)
GLUCOSE BLDC GLUCOMTR-MCNC: 258 MG/DL — HIGH (ref 70–99)
GLUCOSE BLDC GLUCOMTR-MCNC: 94 MG/DL — SIGNIFICANT CHANGE UP (ref 70–99)
GLUCOSE BLDC GLUCOMTR-MCNC: 96 MG/DL — SIGNIFICANT CHANGE UP (ref 70–99)
GLUCOSE BLDC GLUCOMTR-MCNC: 99 MG/DL — SIGNIFICANT CHANGE UP (ref 70–99)
GLUCOSE SERPL-MCNC: 216 MG/DL — HIGH (ref 70–115)
HCT VFR BLD CALC: 50.9 % — SIGNIFICANT CHANGE UP (ref 42–52)
HCT VFR BLD CALC: 51.1 % — SIGNIFICANT CHANGE UP (ref 42–52)
HGB BLD-MCNC: 17.5 G/DL — SIGNIFICANT CHANGE UP (ref 14–18)
HGB BLD-MCNC: 17.6 G/DL — SIGNIFICANT CHANGE UP (ref 14–18)
LYMPHOCYTES # BLD AUTO: 1.2 K/UL — SIGNIFICANT CHANGE UP (ref 1–4.8)
LYMPHOCYTES # BLD AUTO: 9.7 % — LOW (ref 20–55)
MAGNESIUM SERPL-MCNC: 1.9 MG/DL — SIGNIFICANT CHANGE UP (ref 1.8–2.6)
MCHC RBC-ENTMCNC: 29.2 PG — SIGNIFICANT CHANGE UP (ref 27–31)
MCHC RBC-ENTMCNC: 29.5 PG — SIGNIFICANT CHANGE UP (ref 27–31)
MCHC RBC-ENTMCNC: 34.2 G/DL — SIGNIFICANT CHANGE UP (ref 32–36)
MCHC RBC-ENTMCNC: 34.6 G/DL — SIGNIFICANT CHANGE UP (ref 32–36)
MCV RBC AUTO: 85.2 FL — SIGNIFICANT CHANGE UP (ref 80–94)
MCV RBC AUTO: 85.4 FL — SIGNIFICANT CHANGE UP (ref 80–94)
MONOCYTES # BLD AUTO: 1.3 K/UL — HIGH (ref 0–0.8)
MONOCYTES NFR BLD AUTO: 10.8 % — HIGH (ref 3–10)
NEUTROPHILS # BLD AUTO: 8.8 K/UL — HIGH (ref 1.8–8)
NEUTROPHILS NFR BLD AUTO: 73.8 % — HIGH (ref 37–73)
PHOSPHATE SERPL-MCNC: 3 MG/DL — SIGNIFICANT CHANGE UP (ref 2.4–4.7)
PLAT MORPH BLD: NORMAL — SIGNIFICANT CHANGE UP
PLATELET # BLD AUTO: 157 K/UL — SIGNIFICANT CHANGE UP (ref 150–400)
PLATELET # BLD AUTO: SIGNIFICANT CHANGE UP K/UL (ref 150–400)
PLATELET CLUMP BLD QL SMEAR: SIGNIFICANT CHANGE UP
POTASSIUM SERPL-MCNC: 3.3 MMOL/L — LOW (ref 3.5–5.3)
POTASSIUM SERPL-SCNC: 3.3 MMOL/L — LOW (ref 3.5–5.3)
PROT SERPL-MCNC: 7 G/DL — SIGNIFICANT CHANGE UP (ref 6.6–8.7)
RBC # BLD: 5.96 M/UL — SIGNIFICANT CHANGE UP (ref 4.6–6.2)
RBC # BLD: 6 M/UL — SIGNIFICANT CHANGE UP (ref 4.6–6.2)
RBC # FLD: 14.4 % — SIGNIFICANT CHANGE UP (ref 11–15.6)
RBC # FLD: 14.4 % — SIGNIFICANT CHANGE UP (ref 11–15.6)
RBC BLD AUTO: NORMAL — SIGNIFICANT CHANGE UP
SODIUM SERPL-SCNC: 138 MMOL/L — SIGNIFICANT CHANGE UP (ref 135–145)
TROPONIN T SERPL-MCNC: 0.1 NG/ML — HIGH (ref 0–0.06)
WBC # BLD: 11.9 K/UL — HIGH (ref 4.8–10.8)
WBC # BLD: 13.2 K/UL — HIGH (ref 4.8–10.8)
WBC # FLD AUTO: 11.9 K/UL — HIGH (ref 4.8–10.8)
WBC # FLD AUTO: 13.2 K/UL — HIGH (ref 4.8–10.8)

## 2018-03-11 PROCEDURE — 95951: CPT | Mod: 26

## 2018-03-11 PROCEDURE — 99231 SBSQ HOSP IP/OBS SF/LOW 25: CPT

## 2018-03-11 PROCEDURE — 93306 TTE W/DOPPLER COMPLETE: CPT | Mod: 26

## 2018-03-11 PROCEDURE — 99291 CRITICAL CARE FIRST HOUR: CPT

## 2018-03-11 RX ORDER — POTASSIUM CHLORIDE 20 MEQ
40 PACKET (EA) ORAL ONCE
Qty: 0 | Refills: 0 | Status: COMPLETED | OUTPATIENT
Start: 2018-03-11 | End: 2018-03-11

## 2018-03-11 RX ORDER — DEXMEDETOMIDINE HYDROCHLORIDE IN 0.9% SODIUM CHLORIDE 4 UG/ML
0.4 INJECTION INTRAVENOUS
Qty: 200 | Refills: 0 | Status: DISCONTINUED | OUTPATIENT
Start: 2018-03-11 | End: 2018-03-15

## 2018-03-11 RX ORDER — HEPARIN SODIUM 5000 [USP'U]/ML
5000 INJECTION INTRAVENOUS; SUBCUTANEOUS EVERY 12 HOURS
Qty: 0 | Refills: 0 | Status: DISCONTINUED | OUTPATIENT
Start: 2018-03-11 | End: 2018-03-24

## 2018-03-11 RX ADMIN — PANTOPRAZOLE SODIUM 40 MILLIGRAM(S): 20 TABLET, DELAYED RELEASE ORAL at 12:11

## 2018-03-11 RX ADMIN — LEVETIRACETAM 400 MILLIGRAM(S): 250 TABLET, FILM COATED ORAL at 03:43

## 2018-03-11 RX ADMIN — Medication 267.6 MILLIGRAM(S): at 05:46

## 2018-03-11 RX ADMIN — DEXMEDETOMIDINE HYDROCHLORIDE IN 0.9% SODIUM CHLORIDE 12.24 MICROGRAM(S)/KG/HR: 4 INJECTION INTRAVENOUS at 23:05

## 2018-03-11 RX ADMIN — FENTANYL CITRATE 50 MICROGRAM(S): 50 INJECTION INTRAVENOUS at 19:32

## 2018-03-11 RX ADMIN — DEXMEDETOMIDINE HYDROCHLORIDE IN 0.9% SODIUM CHLORIDE 9.18 MICROGRAM(S)/KG/HR: 4 INJECTION INTRAVENOUS at 06:54

## 2018-03-11 RX ADMIN — CHLORHEXIDINE GLUCONATE 15 MILLILITER(S): 213 SOLUTION TOPICAL at 06:01

## 2018-03-11 RX ADMIN — DEXMEDETOMIDINE HYDROCHLORIDE IN 0.9% SODIUM CHLORIDE 9.18 MICROGRAM(S)/KG/HR: 4 INJECTION INTRAVENOUS at 10:00

## 2018-03-11 RX ADMIN — Medication 216 GRAM(S): at 05:50

## 2018-03-11 RX ADMIN — FENTANYL CITRATE 50 MICROGRAM(S): 50 INJECTION INTRAVENOUS at 08:15

## 2018-03-11 RX ADMIN — DEXMEDETOMIDINE HYDROCHLORIDE IN 0.9% SODIUM CHLORIDE 12.24 MICROGRAM(S)/KG/HR: 4 INJECTION INTRAVENOUS at 20:40

## 2018-03-11 RX ADMIN — DEXMEDETOMIDINE HYDROCHLORIDE IN 0.9% SODIUM CHLORIDE 9.18 MICROGRAM(S)/KG/HR: 4 INJECTION INTRAVENOUS at 14:31

## 2018-03-11 RX ADMIN — SODIUM CHLORIDE 100 MILLILITER(S): 9 INJECTION, SOLUTION INTRAVENOUS at 12:10

## 2018-03-11 RX ADMIN — HEPARIN SODIUM 5000 UNIT(S): 5000 INJECTION INTRAVENOUS; SUBCUTANEOUS at 17:51

## 2018-03-11 RX ADMIN — LEVETIRACETAM 400 MILLIGRAM(S): 250 TABLET, FILM COATED ORAL at 16:18

## 2018-03-11 RX ADMIN — Medication 267.6 MILLIGRAM(S): at 17:51

## 2018-03-11 RX ADMIN — FENTANYL CITRATE 50 MICROGRAM(S): 50 INJECTION INTRAVENOUS at 08:30

## 2018-03-11 RX ADMIN — Medication 40 MILLIEQUIVALENT(S): at 17:51

## 2018-03-11 RX ADMIN — CEFTRIAXONE 100 GRAM(S): 500 INJECTION, POWDER, FOR SOLUTION INTRAMUSCULAR; INTRAVENOUS at 04:59

## 2018-03-11 RX ADMIN — FENTANYL CITRATE 50 MICROGRAM(S): 50 INJECTION INTRAVENOUS at 19:45

## 2018-03-11 RX ADMIN — DEXMEDETOMIDINE HYDROCHLORIDE IN 0.9% SODIUM CHLORIDE 9.18 MICROGRAM(S)/KG/HR: 4 INJECTION INTRAVENOUS at 03:50

## 2018-03-11 RX ADMIN — HEPARIN SODIUM 5000 UNIT(S): 5000 INJECTION INTRAVENOUS; SUBCUTANEOUS at 06:01

## 2018-03-11 NOTE — DIETITIAN INITIAL EVALUATION ADULT. - MD RECOMMEND
Change enteral feeds to Nepro 1.8cal at 60ml/hr x 20 hours (1200ml/daily) to provide 1800kcal and 97 grams protein daily

## 2018-03-11 NOTE — DIETITIAN INITIAL EVALUATION ADULT. - OTHER INFO
Pt currently receiving Nepro @ 50ml/hr via OGT, tolerating well. Pt currently receiving Nepro 1.8cal @ 50ml/hr via NGT, tolerating well.

## 2018-03-11 NOTE — PROGRESS NOTE ADULT - SUBJECTIVE AND OBJECTIVE BOX
Patient is a 73y old  Male who presents with a chief complaint of Unresponsive (10 Mar 2018 05:56)      BRIEF HOSPITAL COURSE: 74 yo male, PMHx DM, HF, HTN, CHF EF 25%, presenting with seizure, was code stroke outsiode of window CTA and CT head neg, also head shingles 2 weeks ago,     Events last 24 hours:     PAST MEDICAL & SURGICAL HISTORY:  CHF (congestive heart failure)  HTN (hypertension)  Diabetes  No significant past surgical history      Review of Systems:  CONSTITUTIONAL: No fever, chills, or fatigue  EYES: No eye pain, visual disturbances, or discharge  ENMT:  No difficulty hearing, tinnitus, vertigo; No sinus or throat pain  NECK: No pain or stiffness  RESPIRATORY: No cough, wheezing, chills or hemoptysis; No shortness of breath  CARDIOVASCULAR: No chest pain, palpitations, dizziness, or leg swelling  GASTROINTESTINAL: No abdominal or epigastric pain. No nausea, vomiting, or hematemesis; No diarrhea or constipation. No melena or hematochezia.  GENITOURINARY: No dysuria, frequency, hematuria, or incontinence  NEUROLOGICAL: No headaches, memory loss, loss of strength, numbness, or tremors  SKIN: No itching, burning, rashes, or lesions   MUSCULOSKELETAL: No joint pain or swelling; No muscle, back, or extremity pain  PSYCHIATRIC: No depression, anxiety, mood swings, or difficulty sleeping      Medications:  acyclovir IVPB      acyclovir IVPB 880 milliGRAM(s) IV Intermittent every 12 hours        dexmedetomidine Infusion 0.3 MICROgram(s)/kG/Hr IV Continuous <Continuous>  fentaNYL    Injectable 50 MICROGram(s) IV Push every 1 hour PRN  levETIRAcetam  IVPB 750 milliGRAM(s) IV Intermittent every 12 hours  LORazepam   Injectable 2 milliGRAM(s) IV Push every 2 hours PRN      heparin  Injectable 5000 Unit(s) SubCutaneous every 12 hours    pantoprazole  Injectable 40 milliGRAM(s) IV Push daily      dextrose 50% Injectable 12.5 Gram(s) IV Push once  dextrose 50% Injectable 25 Gram(s) IV Push once  dextrose 50% Injectable 25 Gram(s) IV Push once  dextrose Gel 1 Dose(s) Oral once PRN  glucagon  Injectable 1 milliGRAM(s) IntraMuscular once PRN  insulin Infusion 6 Unit(s)/Hr IV Continuous <Continuous>    dextrose 5%. 1000 milliLiter(s) IV Continuous <Continuous>  multiple electrolytes Injection Type 1 1000 milliLiter(s) IV Continuous <Continuous>      chlorhexidine 0.12% Liquid 15 milliLiter(s) Swish and Spit two times a day        Mode: CPAP with PS  RR (machine): 18  FiO2: 30  PEEP: 5  PS: 5  MAP: 7      ICU Vital Signs Last 24 Hrs  T(C): 36.3 (11 Mar 2018 15:00), Max: 37 (10 Mar 2018 16:00)  T(F): 97.3 (11 Mar 2018 15:00), Max: 98.6 (10 Mar 2018 16:00)  HR: 83 (11 Mar 2018 15:00) (62 - 86)  BP: 118/72 (11 Mar 2018 15:00) (77/49 - 128/80)  BP(mean): 90 (11 Mar 2018 15:00) (59 - 96)  ABP: --  ABP(mean): --  RR: 19 (11 Mar 2018 15:00) (16 - 28)  SpO2: 96% (11 Mar 2018 15:00) (95% - 97%)      ABG - ( 09 Mar 2018 23:06 )  pH: 7.34  /  pCO2: 52    /  pO2: 99    / HCO3: 26    / Base Excess: 1.3   /  SaO2: 98                  I&O's Detail    10 Mar 2018 06:01  -  11 Mar 2018 07:00  --------------------------------------------------------  IN:    dexmedetomidine Infusion: 297 mL    insulin Infusion: 60.5 mL    multiple electrolytes Injection Type 1: 1300 mL    Nepro: 650 mL    Solution: 334 mL    Solution: 1500 mL    Solution: 50 mL    Solution: 200 mL    Solution: 400 mL    Solution: 500 mL  Total IN: 5291.5 mL    OUT:    Indwelling Catheter - Urethral: 1120 mL    Nasoenteral Tube: 75 mL    Voided: 700 mL  Total OUT: 1895 mL    Total NET: 3396.5 mL      11 Mar 2018 07:01  -  11 Mar 2018 15:26  --------------------------------------------------------  IN:    dexmedetomidine Infusion: 121.2 mL    insulin Infusion: 9.5 mL    multiple electrolytes Injection Type 1: 700 mL    Nepro: 350 mL  Total IN: 1180.7 mL    OUT:    Indwelling Catheter - Urethral: 350 mL  Total OUT: 350 mL    Total NET: 830.7 mL            LABS:                        17.6   13.2  )-----------( 157      ( 11 Mar 2018 12:20 )             50.9     03-10    135  |  91<L>  |  52.0<H>  ----------------------------<  355<H>  4.3   |  26.0  |  2.28<H>    Ca    9.8      10 Mar 2018 04:21  Phos  2.0     03-10  Mg     2.0     03-10    TPro  7.7  /  Alb  3.6  /  TBili  1.0  /  DBili  x   /  AST  37  /  ALT  23  /  AlkPhos  105  03-09      CARDIAC MARKERS ( 10 Mar 2018 04:21 )  x     / 0.11 ng/mL / 186 U/L / x     / 10.2 ng/mL  CARDIAC MARKERS ( 09 Mar 2018 22:16 )  x     / 0.07 ng/mL / 176 U/L / x     / 6.8 ng/mL      CAPILLARY BLOOD GLUCOSE  204 (11 Mar 2018 14:00)      POCT Blood Glucose.: 204 mg/dL (11 Mar 2018 14:32)    PT/INR - ( 09 Mar 2018 22:16 )   PT: 11.9 sec;   INR: 1.08 ratio         PTT - ( 09 Mar 2018 22:16 )  PTT:31.8 sec    CULTURES:  Culture Results:   No growth at 1 day.  Culture in progress (03-10-18 @ 10:29)      Physical Examination:    General: No acute distress.  Alert, oriented, interactive, nonfocal    HEENT: Pupils equal, reactive to light.  Symmetric.    PULM: Clear to auscultation bilaterally, no significant sputum production    CVS: Regular rate and rhythm, no murmurs, rubs, or gallops    ABD: Soft, nondistended, nontender, normoactive bowel sounds, no masses    EXT: No edema, nontender    SKIN: Warm and well perfused, no rashes noted.    RADIOLOGY: ***    CRITICAL CARE TIME SPENT: Patient is a 73y old  Male who presents with a chief complaint of Unresponsive (10 Mar 2018 05:56)      BRIEF HOSPITAL COURSE: 74 yo male, PMHx DM, HF, HTN, CHF EF 25%, presenting with seizure, was code stroke outsiode of window CTA and CT head neg, also head shingles 2 weeks ago,     Events last 24 hours:     PAST MEDICAL & SURGICAL HISTORY:  CHF (congestive heart failure)  HTN (hypertension)  Diabetes  No significant past surgical history      Review of Systems:  CONSTITUTIONAL: No fever, chills, or fatigue  EYES: No eye pain, visual disturbances, or discharge  ENMT:  No difficulty hearing, tinnitus, vertigo; No sinus or throat pain  NECK: No pain or stiffness  RESPIRATORY: No cough, wheezing, chills or hemoptysis; No shortness of breath  CARDIOVASCULAR: No chest pain, palpitations, dizziness, or leg swelling  GASTROINTESTINAL: No abdominal or epigastric pain. No nausea, vomiting, or hematemesis; No diarrhea or constipation. No melena or hematochezia.  GENITOURINARY: No dysuria, frequency, hematuria, or incontinence  NEUROLOGICAL: No headaches, memory loss, loss of strength, numbness, or tremors  SKIN: No itching, burning, rashes, or lesions   MUSCULOSKELETAL: No joint pain or swelling; No muscle, back, or extremity pain  PSYCHIATRIC: No depression, anxiety, mood swings, or difficulty sleeping      Medications:  acyclovir IVPB      acyclovir IVPB 880 milliGRAM(s) IV Intermittent every 12 hours        dexmedetomidine Infusion 0.3 MICROgram(s)/kG/Hr IV Continuous <Continuous>  fentaNYL    Injectable 50 MICROGram(s) IV Push every 1 hour PRN  levETIRAcetam  IVPB 750 milliGRAM(s) IV Intermittent every 12 hours  LORazepam   Injectable 2 milliGRAM(s) IV Push every 2 hours PRN      heparin  Injectable 5000 Unit(s) SubCutaneous every 12 hours    pantoprazole  Injectable 40 milliGRAM(s) IV Push daily      dextrose 50% Injectable 12.5 Gram(s) IV Push once  dextrose 50% Injectable 25 Gram(s) IV Push once  dextrose 50% Injectable 25 Gram(s) IV Push once  dextrose Gel 1 Dose(s) Oral once PRN  glucagon  Injectable 1 milliGRAM(s) IntraMuscular once PRN  insulin Infusion 6 Unit(s)/Hr IV Continuous <Continuous>    dextrose 5%. 1000 milliLiter(s) IV Continuous <Continuous>  multiple electrolytes Injection Type 1 1000 milliLiter(s) IV Continuous <Continuous>      chlorhexidine 0.12% Liquid 15 milliLiter(s) Swish and Spit two times a day        Mode: CPAP with PS  RR (machine): 18  FiO2: 30  PEEP: 5  PS: 5  MAP: 7      ICU Vital Signs Last 24 Hrs  T(C): 36.3 (11 Mar 2018 15:00), Max: 37 (10 Mar 2018 16:00)  T(F): 97.3 (11 Mar 2018 15:00), Max: 98.6 (10 Mar 2018 16:00)  HR: 83 (11 Mar 2018 15:00) (62 - 86)  BP: 118/72 (11 Mar 2018 15:00) (77/49 - 128/80)  BP(mean): 90 (11 Mar 2018 15:00) (59 - 96)  ABP: --  ABP(mean): --  RR: 19 (11 Mar 2018 15:00) (16 - 28)  SpO2: 96% (11 Mar 2018 15:00) (95% - 97%)      ABG - ( 09 Mar 2018 23:06 )  pH: 7.34  /  pCO2: 52    /  pO2: 99    / HCO3: 26    / Base Excess: 1.3   /  SaO2: 98                  I&O's Detail    10 Mar 2018 06:01  -  11 Mar 2018 07:00  --------------------------------------------------------  IN:    dexmedetomidine Infusion: 297 mL    insulin Infusion: 60.5 mL    multiple electrolytes Injection Type 1: 1300 mL    Nepro: 650 mL    Solution: 334 mL    Solution: 1500 mL    Solution: 50 mL    Solution: 200 mL    Solution: 400 mL    Solution: 500 mL  Total IN: 5291.5 mL    OUT:    Indwelling Catheter - Urethral: 1120 mL    Nasoenteral Tube: 75 mL    Voided: 700 mL  Total OUT: 1895 mL    Total NET: 3396.5 mL      11 Mar 2018 07:01  -  11 Mar 2018 15:26  --------------------------------------------------------  IN:    dexmedetomidine Infusion: 121.2 mL    insulin Infusion: 9.5 mL    multiple electrolytes Injection Type 1: 700 mL    Nepro: 350 mL  Total IN: 1180.7 mL    OUT:    Indwelling Catheter - Urethral: 350 mL  Total OUT: 350 mL    Total NET: 830.7 mL            LABS:                        17.6   13.2  )-----------( 157      ( 11 Mar 2018 12:20 )             50.9     03-10    135  |  91<L>  |  52.0<H>  ----------------------------<  355<H>  4.3   |  26.0  |  2.28<H>    Ca    9.8      10 Mar 2018 04:21  Phos  2.0     03-10  Mg     2.0     03-10    TPro  7.7  /  Alb  3.6  /  TBili  1.0  /  DBili  x   /  AST  37  /  ALT  23  /  AlkPhos  105  03-09      CARDIAC MARKERS ( 10 Mar 2018 04:21 )  x     / 0.11 ng/mL / 186 U/L / x     / 10.2 ng/mL  CARDIAC MARKERS ( 09 Mar 2018 22:16 )  x     / 0.07 ng/mL / 176 U/L / x     / 6.8 ng/mL      CAPILLARY BLOOD GLUCOSE  204 (11 Mar 2018 14:00)      POCT Blood Glucose.: 204 mg/dL (11 Mar 2018 14:32)    PT/INR - ( 09 Mar 2018 22:16 )   PT: 11.9 sec;   INR: 1.08 ratio         PTT - ( 09 Mar 2018 22:16 )  PTT:31.8 sec    CULTURES:  Culture Results:   No growth at 1 day.  Culture in progress (03-10-18 @ 10:29)      Physical Examination:    General: No acute distress.  Alert, oriented, interactive, nonfocal    HEENT: Pupils equal, reactive to light.  Symmetric.    PULM: Clear to auscultation bilaterally, no significant sputum production    CVS: Regular rate and rhythm, no murmurs, rubs, or gallops    ABD: Soft, nondistended, nontender, normoactive bowel sounds, no masses    EXT: No edema, nontender    SKIN: Warm and well perfused, no rashes noted.    NEURO moving all extremities, not following commands    RADIOLOGY: EEG Impression:  Abnormal VEEG due to the presence of:  1.	moderate- marked generalized slowing and background suppression  2.	Marked right hemisphere voltage suppression and right fronto-temporal PLEDs at a frequency of approximately 1 Hz gradually transitioning to higher amplitude theta activity with brief bursts of beta and admixed centro-temporal sharp waves.  3.	No clinical seizures    Clinical Correlation:  The study is consistent with a diffuse encephalopathy due to sedative medications and an acute right fronto-temporal injury either encephalitic or vascular. Some improvement in right hemisphere background over nigh†.      Lb Reyes MD  Director of Neurology, [facility]  Attending Epileptologist and , [facility]      CRITICAL CARE TIME SPENT: 40 min

## 2018-03-11 NOTE — PROGRESS NOTE ADULT - SUBJECTIVE AND OBJECTIVE BOX
[facility]                                        [facility] [place]                                 Marco Cox, & Austyn                                  [address]                                        [place], [place], [address]                                             [phone]        Remains on mechanical ventilator.    Vital Signs Last 24 Hrs  T(F): 96.6 (11 Mar 2018 08:00), Max: 98.6 (10 Mar 2018 16:00)  HR: 76 (11 Mar 2018 08:00) (62 - 100)  BP: 128/80 (11 Mar 2018 08:00) (77/49 - 195/113)  BP(mean): 96 (11 Mar 2018 08:00) (59 - 150)  RR: 28 (11 Mar 2018 08:00) (16 - 28)  SpO2: 96% (11 Mar 2018 08:00) (93% - 99%)    opens eyes briefly to stimuli.  Not following instructions  Pupils react.  Face symmetric.  Minimal movement to stimuli bilaterally.    CSF  protein: 135  glucose 234 (serum was 293 at the time)    WBC: 3  RBC: 47

## 2018-03-11 NOTE — PROGRESS NOTE ADULT - SUBJECTIVE AND OBJECTIVE BOX
Patient is a 73y old  Male who presents with a chief complaint of Unresponsive (10 Mar 2018 05:56)    PAST MEDICAL & SURGICAL HISTORY:  CHF (congestive heart failure)  HTN (hypertension)  Diabetes  No significant past surgical history    KATELYNN AGUILAR   73y    Male    BRIEF HOSPITAL COURSE:    72 yo male, PMHx DM, HF, HTN, who presented BIBA after being found unresponsive by wife at home. Per pt's wife, she returned home at approximately 16:45, patient was awake and alert, in usual state of health with no complaints. Pt ate dinner then was lying on the couch watching TV. When wife went to check on him, she noted him to be staring off to the left with his eyes open, not responding to verbal stimuli. She called her son, who then called 911. Timeline unclear: pt's wife states last known normal was approx 18:00, however pt's other son Tenzin who lives in the same house states his mother called him to check on the pt after finding him unresponsive around 20:00. Upon arrival to ED, /110, patient was unresponsive to verbal stimuli, and was intubated by Anesthesia for concerns of inability to protect airway and hypoxia. Patient was noted to have L cheek focal twitching, withdrew to noxious stimuli x 4 extremities, however had a deficit on LUE / LLE. A CODE STROKE was called, and tele neurology Dr. Wan was consulted. Initial CT head negative for acute hemorrhage or infarct, no visible MCA sign. After careful history-taking from wife and patient's sons, patient was deemed to be outside of the window for tPA. Patient has a PPM, and per pt's wife, is unable to have an MRI. Decision was made to obtain CTA head/neck despite BEVERLY on CKD, which was negative for acute occlusion. Patient was transferred to MICU for further management. Of note, patient's wife mentioned pt has been home with Shingles over the past 3 weeks.    s/p LP with some red cells ? traumatic.  PLEDS       Review of Systems:   UATO               ICU Vital Signs Last 24 Hrs  T(C): 36.3 (11 Mar 2018 04:00), Max: 37 (10 Mar 2018 16:00)  T(F): 97.3 (11 Mar 2018 04:00), Max: 98.6 (10 Mar 2018 16:00)  HR: 65 (11 Mar 2018 04:00) (64 - 101)  BP: 109/62 (11 Mar 2018 04:00) (77/49 - 195/113)  BP(mean): 80 (11 Mar 2018 04:00) (59 - 150)  ABP: --  ABP(mean): --  RR: 16 (11 Mar 2018 04:00) (16 - 28)  SpO2: 96% (11 Mar 2018 04:00) (93% - 100%)    Physical Examination:    General:     HEENT:     PULM:     CVS:     ABD:     EXT:     SKIN:     Neuro:    ABG - ( 09 Mar 2018 23:06 )  pH: 7.34  /  pCO2: 52    /  pO2: 99    / HCO3: 26    / Base Excess: 1.3   /  SaO2: 98                Mode: AC/ CMV (Assist Control/ Continuous Mandatory Ventilation)  RR (machine): 16  TV (machine): 500  FiO2: 30  PEEP: 5  MAP: 9  PIP: 24    Mode: AC/ CMV (Assist Control/ Continuous Mandatory Ventilation), RR (machine): 16, TV (machine): 500, FiO2: 30, PEEP: 5, MAP: 9, PIP: 24  LABS:                        19.6   14.8  )-----------( 199      ( 10 Mar 2018 06:05 )             57.0     03-10    135  |  91<L>  |  52.0<H>  ----------------------------<  355<H>  4.3   |  26.0  |  2.28<H>    Ca    9.8      10 Mar 2018 04:21  Phos  2.0     03-10  Mg     2.0     03-10    TPro  7.7  /  Alb  3.6  /  TBili  1.0  /  DBili  x   /  AST  37  /  ALT  23  /  AlkPhos  105  03-09      CARDIAC MARKERS ( 10 Mar 2018 04:21 )  x     / 0.11 ng/mL / 186 U/L / x     / 10.2 ng/mL  CARDIAC MARKERS ( 09 Mar 2018 22:16 )  x     / 0.07 ng/mL / 176 U/L / x     / 6.8 ng/mL      CAPILLARY BLOOD GLUCOSE  143 (11 Mar 2018 03:00)      POCT Blood Glucose.: 143 mg/dL (11 Mar 2018 03:37)      PT/INR - ( 09 Mar 2018 22:16 )   PT: 11.9 sec;   INR: 1.08 ratio         PTT - ( 09 Mar 2018 22:16 )  PTT:31.8 sec    CULTURES:      Medications:  acyclovir IVPB      acyclovir IVPB 880 milliGRAM(s) IV Intermittent every 12 hours  ampicillin  IVPB      ampicillin  IVPB 2 Gram(s) IV Intermittent every 6 hours  cefTRIAXone   IVPB      cefTRIAXone   IVPB 2 Gram(s) IV Intermittent every 24 hours  vancomycin  IVPB 1250 milliGRAM(s) IV Intermittent every 24 hours  vancomycin  IVPB      dexmedetomidine Infusion 0.3 MICROgram(s)/kG/Hr IV Continuous <Continuous>  fentaNYL    Injectable 50 MICROGram(s) IV Push every 1 hour PRN  levETIRAcetam  IVPB 750 milliGRAM(s) IV Intermittent every 12 hours  LORazepam   Injectable 2 milliGRAM(s) IV Push every 2 hours PRN  pantoprazole  Injectable 40 milliGRAM(s) IV Push daily  dextrose 50% Injectable 12.5 Gram(s) IV Push once  dextrose 50% Injectable 25 Gram(s) IV Push once  dextrose 50% Injectable 25 Gram(s) IV Push once  dextrose Gel 1 Dose(s) Oral once PRN  glucagon  Injectable 1 milliGRAM(s) IntraMuscular once PRN  insulin Infusion 6 Unit(s)/Hr IV Continuous <Continuous>  dextrose 5%. 1000 milliLiter(s) IV Continuous <Continuous>  multiple electrolytes Injection Type 1 1000 milliLiter(s) IV Continuous <Continuous>  chlorhexidine 0.12% Liquid 15 milliLiter(s) Swish and Spit two times a day      03-09 @ 07:01  -  03-10 @ 07:00  --------------------------------------------------------  IN: 44 mL / OUT: 950 mL / NET: -906 mL    03-10 @ 06:01  -  03-11 @ 04:11  --------------------------------------------------------  IN: 4143 mL / OUT: 1765 mL / NET: 2378 mL        RADIOLOGY/IMAGING/ECHO    < from: CT Angio Head w/ IV Cont (03.10.18 @ 00:24) >  MPRESSION:  CTA head and neck:   No major vessel occlusion, hemodynamically significant stenosis,   dissection or aneurysm.  Congenital variation in anatomy as above.    < from: TTE Echo Complete w/Doppler (03.06.15 @ 09:37) >   IMPRESSION:  Summary:   1. Technically difficult study.   2. Endocardial visualization was enhanced with intravenous echo contrast.   3. Severely decreased global left ventricular systolic function.   4. Left ventricular ejection fraction, by visual estimation, is 25 to   30%.   5. Mildly increased left ventricular internal cavity size.   6. Spectral Doppler shows restrictive pattern of left ventricular   myocardial filling (Grade III diastolic dysfunction).   7. Severely dilated right atrium.   8. Using volume index method the left atrium is not dilated. On visual   inspection and based on 2D measurements, the left atrium appears dilated.   9. Thickening of the anterior and posterior mitral valve leaflets.  10.Trace mitral valve regurgitation.  11. Sclerotic aortic valve with normal opening.  12. The main pulmonary artery is normal in size.  13. Adequate TR velocity was not obtained to accurately assess RVSP.  14. There is no evidence of pericardial effusion.        Assessment/Plan:    73M DM HTN  CKD CHF EF 25% (2O15)  admit with AMS intubated   sz.  recent shingles  RX for meningoencephalitis.        Neuro AED keppra PRN ativan cEEG.    Cor   HD stable   Pulm  SBT as tolerated by MS.    Gi    Renal  CKD  Heme/DVT  ID  LP done  on broad ABX and acylcovir   no + cultures HSV PCR neg.    Endo  insulin infusion for glycemic control   Lines/tubes PIV    Minutes of Critical Care time: 34  (Reviewing data, imaging, discussing with multidisciplinary team, non inclusive of procedures, discussing goals of care with patient/family) Patient is a 73y old  Male who presents with a chief complaint of Unresponsive (10 Mar 2018 05:56)    PAST MEDICAL & SURGICAL HISTORY:  CHF (congestive heart failure)  HTN (hypertension)  Diabetes  No significant past surgical history    KATELYNN AGUILAR   73y    Male    BRIEF HOSPITAL COURSE:    74 yo male, PMHx DM, HF, HTN, who presented BIBA after being found unresponsive by wife at home. Per pt's wife, she returned home at approximately 16:45, patient was awake and alert, in usual state of health with no complaints. Pt ate dinner then was lying on the couch watching TV. When wife went to check on him, she noted him to be staring off to the left with his eyes open, not responding to verbal stimuli. She called her son, who then called 911. Timeline unclear: pt's wife states last known normal was approx 18:00, however pt's other son Tenzin who lives in the same house states his mother called him to check on the pt after finding him unresponsive around 20:00. Upon arrival to ED, /110, patient was unresponsive to verbal stimuli, and was intubated by Anesthesia for concerns of inability to protect airway and hypoxia. Patient was noted to have L cheek focal twitching, withdrew to noxious stimuli x 4 extremities, however had a deficit on LUE / LLE. A CODE STROKE was called, and tele neurology Dr. Wan was consulted. Initial CT head negative for acute hemorrhage or infarct, no visible MCA sign. After careful history-taking from wife and patient's sons, patient was deemed to be outside of the window for tPA. Patient has a PPM, and per pt's wife, is unable to have an MRI. Decision was made to obtain CTA head/neck despite BEVERLY on CKD, which was negative for acute occlusion. Patient was transferred to MICU for further management. Of note, patient's wife mentioned pt has been home with Shingles over the past 3 weeks.    s/p LP with some red cells ? traumatic.  PLEDS       Review of Systems:   [facility]ATO               ICU Vital Signs Last 24 Hrs  T(C): 36.3 (11 Mar 2018 04:00), Max: 37 (10 Mar 2018 16:00)  T(F): 97.3 (11 Mar 2018 04:00), Max: 98.6 (10 Mar 2018 16:00)  HR: 65 (11 Mar 2018 04:00) (64 - 101)  BP: 109/62 (11 Mar 2018 04:00) (77/49 - 195/113)  BP(mean): 80 (11 Mar 2018 04:00) (59 - 150)  ABP: --  ABP(mean): --  RR: 16 (11 Mar 2018 04:00) (16 - 28)  SpO2: 96% (11 Mar 2018 04:00) (93% - 100%)    Physical Examination:    General:     HEENT:     PULM:     CVS:     ABD:     EXT:     SKIN:     Neuro:    ABG - ( 09 Mar 2018 23:06 )  pH: 7.34  /  pCO2: 52    /  pO2: 99    / HCO3: 26    / Base Excess: 1.3   /  SaO2: 98                Mode: AC/ CMV (Assist Control/ Continuous Mandatory Ventilation)  RR (machine): 16  TV (machine): 500  FiO2: 30  PEEP: 5  MAP: 9  PIP: 24    Mode: AC/ CMV (Assist Control/ Continuous Mandatory Ventilation), RR (machine): 16, TV (machine): 500, FiO2: 30, PEEP: 5, MAP: 9, PIP: 24  LABS:                        19.6   14.8  )-----------( 199      ( 10 Mar 2018 06:05 )             57.0     03-10    135  |  91<L>  |  52.0<H>  ----------------------------<  355<H>  4.3   |  26.0  |  2.28<H>    Ca    9.8      10 Mar 2018 04:21  Phos  2.0     03-10  Mg     2.0     03-10    TPro  7.7  /  Alb  3.6  /  TBili  1.0  /  DBili  x   /  AST  37  /  ALT  23  /  AlkPhos  105  03-09      CARDIAC MARKERS ( 10 Mar 2018 04:21 )  x     / 0.11 ng/mL / 186 U/L / x     / 10.2 ng/mL  CARDIAC MARKERS ( 09 Mar 2018 22:16 )  x     / 0.07 ng/mL / 176 U/L / x     / 6.8 ng/mL      CAPILLARY BLOOD GLUCOSE  143 (11 Mar 2018 03:00)      POCT Blood Glucose.: 143 mg/dL (11 Mar 2018 03:37)      PT/INR - ( 09 Mar 2018 22:16 )   PT: 11.9 sec;   INR: 1.08 ratio         PTT - ( 09 Mar 2018 22:16 )  PTT:31.8 sec    CULTURES:      Medications:  acyclovir IVPB      acyclovir IVPB 880 milliGRAM(s) IV Intermittent every 12 hours  ampicillin  IVPB      ampicillin  IVPB 2 Gram(s) IV Intermittent every 6 hours  cefTRIAXone   IVPB      cefTRIAXone   IVPB 2 Gram(s) IV Intermittent every 24 hours  vancomycin  IVPB 1250 milliGRAM(s) IV Intermittent every 24 hours  vancomycin  IVPB      dexmedetomidine Infusion 0.3 MICROgram(s)/kG/Hr IV Continuous <Continuous>  fentaNYL    Injectable 50 MICROGram(s) IV Push every 1 hour PRN  levETIRAcetam  IVPB 750 milliGRAM(s) IV Intermittent every 12 hours  LORazepam   Injectable 2 milliGRAM(s) IV Push every 2 hours PRN  pantoprazole  Injectable 40 milliGRAM(s) IV Push daily  dextrose 50% Injectable 12.5 Gram(s) IV Push once  dextrose 50% Injectable 25 Gram(s) IV Push once  dextrose 50% Injectable 25 Gram(s) IV Push once  dextrose Gel 1 Dose(s) Oral once PRN  glucagon  Injectable 1 milliGRAM(s) IntraMuscular once PRN  insulin Infusion 6 Unit(s)/Hr IV Continuous <Continuous>  dextrose 5%. 1000 milliLiter(s) IV Continuous <Continuous>  multiple electrolytes Injection Type 1 1000 milliLiter(s) IV Continuous <Continuous>  chlorhexidine 0.12% Liquid 15 milliLiter(s) Swish and Spit two times a day      03-09 @ 07:01  -  03-10 @ 07:00  --------------------------------------------------------  IN: 44 mL / OUT: 950 mL / NET: -906 mL    03-10 @ 06:01  -  03-11 @ 04:11  --------------------------------------------------------  IN: 4143 mL / OUT: 1765 mL / NET: 2378 mL        RADIOLOGY/IMAGING/ECHO    < from: CT Angio Head w/ IV Cont (03.10.18 @ 00:24) >  MPRESSION:  CTA head and neck:   No major vessel occlusion, hemodynamically significant stenosis,   dissection or aneurysm.  Congenital variation in anatomy as above.    < from: TTE Echo Complete w/Doppler (03.06.15 @ 09:37) >   IMPRESSION:  Summary:   1. Technically difficult study.   2. Endocardial visualization was enhanced with intravenous echo contrast.   3. Severely decreased global left ventricular systolic function.   4. Left ventricular ejection fraction, by visual estimation, is 25 to   30%.   5. Mildly increased left ventricular internal cavity size.   6. Spectral Doppler shows restrictive pattern of left ventricular   myocardial filling (Grade III diastolic dysfunction).   7. Severely dilated right atrium.   8. Using volume index method the left atrium is not dilated. On visual   inspection and based on 2D measurements, the left atrium appears dilated.   9. Thickening of the anterior and posterior mitral valve leaflets.  10.Trace mitral valve regurgitation.  11. Sclerotic aortic valve with normal opening.  12. The main pulmonary artery is normal in size.  13. Adequate TR velocity was not obtained to accurately assess RVSP.  14. There is no evidence of pericardial effusion.        Assessment/Plan:    73M DM HTN  CKD CHF EF 25% (2O15)  admit with AMS intubated   sz.  recent shingles  RX for meningoencephalitis.        Neuro AED keppra PRN ativan cEEG.    Cor   HD stable   Pulm  SBT as tolerated by MS.    Gi    Renal  CKD  Heme/DVT   ID  LP done  on broad ABX and acylcovir   no + cultures HSV PCR neg.    Endo  insulin infusion for glycemic control   Lines/tubes PIV    Minutes of Critical Care time: 34  (Reviewing data, imaging, discussing with multidisciplinary team, non inclusive of procedures, discussing goals of care with patient/family) Patient is a 73y old  Male who presents with a chief complaint of Unresponsive (10 Mar 2018 05:56)    PAST MEDICAL & SURGICAL HISTORY:  CHF (congestive heart failure)  HTN (hypertension)  Diabetes  No significant past surgical history    KATELNYN AGUILAR   73y    Male    BRIEF HOSPITAL COURSE:    72 yo male, PMHx DM, HF, HTN, who presented BIBA after being found unresponsive by wife at home. Per pt's wife, she returned home at approximately 16:45, patient was awake and alert, in usual state of health with no complaints. Pt ate dinner then was lying on the couch watching TV. When wife went to check on him, she noted him to be staring off to the left with his eyes open, not responding to verbal stimuli. She called her son, who then called 911. Timeline unclear: pt's wife states last known normal was approx 18:00, however pt's other son Tenzin who lives in the same house states his mother called him to check on the pt after finding him unresponsive around 20:00. Upon arrival to ED, /110, patient was unresponsive to verbal stimuli, and was intubated by Anesthesia for concerns of inability to protect airway and hypoxia. Patient was noted to have L cheek focal twitching, withdrew to noxious stimuli x 4 extremities, however had a deficit on LUE / LLE. A CODE STROKE was called, and tele neurology Dr. Wan was consulted. Initial CT head negative for acute hemorrhage or infarct, no visible MCA sign. After careful history-taking from wife and patient's sons, patient was deemed to be outside of the window for tPA. Patient has a PPM, and per pt's wife, is unable to have an MRI. Decision was made to obtain CTA head/neck despite BEVERLY on CKD, which was negative for acute occlusion. Patient was transferred to MICU for further management. Of note, patient's wife mentioned pt has been home with Shingles over the past 3 weeks.    s/p LP with some red cells ? traumatic.  PLEDS on EEG       Review of Systems:   [facility]ATO               ICU Vital Signs Last 24 Hrs  T(C): 36.3 (11 Mar 2018 04:00), Max: 37 (10 Mar 2018 16:00)  T(F): 97.3 (11 Mar 2018 04:00), Max: 98.6 (10 Mar 2018 16:00)  HR: 65 (11 Mar 2018 04:00) (64 - 101)  BP: 109/62 (11 Mar 2018 04:00) (77/49 - 195/113)  BP(mean): 80 (11 Mar 2018 04:00) (59 - 150)  ABP: --  ABP(mean): --  RR: 16 (11 Mar 2018 04:00) (16 - 28)  SpO2: 96% (11 Mar 2018 04:00) (93% - 100%)    Physical Examination:    General: awakens off sedation no commands    HEENT:  wrapped EEG leads    PULM: bilateral BS    CVS: s1 s2 reg    ABD: soft NT     EXT:  no edema     SKIN: warm    Neuro:  moves 4 ext no commands    ABG - ( 09 Mar 2018 23:06 )  pH: 7.34  /  pCO2: 52    /  pO2: 99    / HCO3: 26    / Base Excess: 1.3   /  SaO2: 98          Mode: AC/ CMV (Assist Control/ Continuous Mandatory Ventilation)  RR (machine): 16  TV (machine): 500  FiO2: 30  PEEP: 5  MAP: 9  PIP: 24    Mode: AC/ CMV (Assist Control/ Continuous Mandatory Ventilation), RR (machine): 16, TV (machine): 500, FiO2: 30, PEEP: 5, MAP: 9, PIP: 24  LABS:                        19.6   14.8  )-----------( 199      ( 10 Mar 2018 06:05 )             57.0     03-10    135  |  91<L>  |  52.0<H>  ----------------------------<  355<H>  4.3   |  26.0  |  2.28<H>    Ca    9.8      10 Mar 2018 04:21  Phos  2.0     03-10  Mg     2.0     03-10    TPro  7.7  /  Alb  3.6  /  TBili  1.0  /  DBili  x   /  AST  37  /  ALT  23  /  AlkPhos  105  03-09      CARDIAC MARKERS ( 10 Mar 2018 04:21 )  x     / 0.11 ng/mL / 186 U/L / x     / 10.2 ng/mL  CARDIAC MARKERS ( 09 Mar 2018 22:16 )  x     / 0.07 ng/mL / 176 U/L / x     / 6.8 ng/mL      CAPILLARY BLOOD GLUCOSE  143 (11 Mar 2018 03:00)      POCT Blood Glucose.: 143 mg/dL (11 Mar 2018 03:37)      PT/INR - ( 09 Mar 2018 22:16 )   PT: 11.9 sec;   INR: 1.08 ratio         PTT - ( 09 Mar 2018 22:16 )  PTT:31.8 sec    CULTURES:      Medications:  acyclovir IVPB      acyclovir IVPB 880 milliGRAM(s) IV Intermittent every 12 hours  ampicillin  IVPB      ampicillin  IVPB 2 Gram(s) IV Intermittent every 6 hours  cefTRIAXone   IVPB      cefTRIAXone   IVPB 2 Gram(s) IV Intermittent every 24 hours  vancomycin  IVPB 1250 milliGRAM(s) IV Intermittent every 24 hours  vancomycin  IVPB      dexmedetomidine Infusion 0.3 MICROgram(s)/kG/Hr IV Continuous <Continuous>  fentaNYL    Injectable 50 MICROGram(s) IV Push every 1 hour PRN  levETIRAcetam  IVPB 750 milliGRAM(s) IV Intermittent every 12 hours  LORazepam   Injectable 2 milliGRAM(s) IV Push every 2 hours PRN  pantoprazole  Injectable 40 milliGRAM(s) IV Push daily  dextrose 50% Injectable 12.5 Gram(s) IV Push once  dextrose 50% Injectable 25 Gram(s) IV Push once  dextrose 50% Injectable 25 Gram(s) IV Push once  dextrose Gel 1 Dose(s) Oral once PRN  glucagon  Injectable 1 milliGRAM(s) IntraMuscular once PRN  insulin Infusion 6 Unit(s)/Hr IV Continuous <Continuous>  dextrose 5%. 1000 milliLiter(s) IV Continuous <Continuous>  multiple electrolytes Injection Type 1 1000 milliLiter(s) IV Continuous <Continuous>  chlorhexidine 0.12% Liquid 15 milliLiter(s) Swish and Spit two times a day      03-09 @ 07:01  -  03-10 @ 07:00  --------------------------------------------------------  IN: 44 mL / OUT: 950 mL / NET: -906 mL    03-10 @ 06:01  -  03-11 @ 04:11  --------------------------------------------------------  IN: 4143 mL / OUT: 1765 mL / NET: 2378 mL        RADIOLOGY/IMAGING/ECHO    < from: CT Angio Head w/ IV Cont (03.10.18 @ 00:24) >  MPRESSION:  CTA head and neck:   No major vessel occlusion, hemodynamically significant stenosis,   dissection or aneurysm.  Congenital variation in anatomy as above.    < from: TTE Echo Complete w/Doppler (03.06.15 @ 09:37) >   IMPRESSION:  Summary:   1. Technically difficult study.   2. Endocardial visualization was enhanced with intravenous echo contrast.   3. Severely decreased global left ventricular systolic function.   4. Left ventricular ejection fraction, by visual estimation, is 25 to   30%.   5. Mildly increased left ventricular internal cavity size.   6. Spectral Doppler shows restrictive pattern of left ventricular   myocardial filling (Grade III diastolic dysfunction).   7. Severely dilated right atrium.   8. Using volume index method the left atrium is not dilated. On visual   inspection and based on 2D measurements, the left atrium appears dilated.   9. Thickening of the anterior and posterior mitral valve leaflets.  10.Trace mitral valve regurgitation.  11. Sclerotic aortic valve with normal opening.  12. The main pulmonary artery is normal in size.  13. Adequate TR velocity was not obtained to accurately assess RVSP.  14. There is no evidence of pericardial effusion.        Assessment/Plan:    73M DM HTN  CKD CHF EF 25% (2O15)  admit with AMS intubated   sz.  recent shingles  RX for meningoencephalitis.   VDRF due to AMS encephalopathy.  Likely post ictal.         Neuro AED keppra PRN ativan cEEG.  no reported sz by nursing of epileptologist.   Further imaging per neuro consult.     Cor   HD stable   Pulm  SBT as tolerated by MS. KATHI Landis to goal  Renal  CKD  Non oliguric  watch for YULIANA after CTA  Heme/DVT   add heparin  sq  ID  LP done  on broad ABX and acylcovir   no + cultures HSV PCR neg.   De-escalate ABX and antiviral  will d/w Dr Hair   Endo  insulin infusion for glycemic control   Lines/tubes PIV    Minutes of Critical Care time: 34  (Reviewing data, imaging, discussing with multidisciplinary team, non inclusive of procedures, discussing goals of care with patient/family)

## 2018-03-11 NOTE — DIETITIAN INITIAL EVALUATION ADULT. - NS AS NUTRI INTERV ENTERAL NUTRITION
Change enteral feeds to Nepro 1.8cal at 60ml/hr x 20 hours (1200ml/daily) to provide 1800kcal and 97 grams protein daily.

## 2018-03-11 NOTE — EEG REPORT - NS EEG TEXT BOX
[facility]  Report of Continuous Video EEG    [facility]: [address] [facility] [facility] , [address], [place] [address], Ph#: [phone]  [facility]: [address] [place] [address], [place] [address], Ph#: [phone]  Office: [address] [facility], [facility] [address], [place] [address] Ph#: [phone]    Patient Name: Reynaldo Clemens    Age: 73 y, : 1945  Patient ID: -, MRN #: -, Traore: -  Referring Physician: Vickie Gunderson  EEG #: [id number]    Study Date: 3/10/2018    Study Information:    EEG Recording Technique:  The patient underwent continuous Video-EEG monitoring, using Telemetry System hardware on the XLTek Digital System. EEG and video data were stored on a computer hard drive with important events saved in digital archive files. The material was reviewed by a physician (electroencephalographer / epileptologist) on a daily basis. Antonio and seizure detection algorithms were utilized and reviewed. An EEG Technician attended to the patient, and was available throughout daytime work hours.  The epilepsy center neurologist was available in person or on call 24-hours per day.    EEG Placement and Labeling of Electrodes:  The EEG was performed utilizing 20 channel referential EEG connections (coronal over temporal over parasagittal montage) using all standard 10-20 electrode placements with EKG, with additional electrodes placed in the inferior temporal region using the modified 10-10 montage electrode placements for elective admissions, or if deemed necessary. Recording was at a sampling rate of 256 samples per second per channel. Time synchronized digital video recording was done simultaneously with EEG recording. A low light infrared camera was used for low light recording. ?  History: Seizures  -  -    Medication  Insulin  Keppra  precedex  Ativan    Study Interpretation:    FINDINGS:  The background revealed diffuse low amplitude 5 UV mixed frequency theta and beta activity over the left hemisphere without variability or reactivity to external stimuli. Approximately 1 Hz right hemisphere PLEDS were seen continuously with intervening suppression at an approximate frequency of 1HZ. Over the course of 3/10 into 3/11, PLEDs appeared less well formed and replaced by low amplitude bursts of beta and admixed centro-temporal sharp waves.  By the morning of 3/11 increased voltage and more continuous periods of theta in the background bilaterally.    Background Slowing:  Moderate marked generalized slowing with periods of generalized suppression.    Focal Slowing:   Right hemisphere     Sleep Background:  Normal sleep transients not recorded.    Other Paroxysmal Non-Epileptiform Findings:  None were present.    Interictal Epileptiform Activity:   RFT PLEDs giving way to bursts of beta and admixed centro-temporal sharp waves    Events:  Clinical events: None recorded.  Seizures: None recorded.    Artifacts:  Intermittent myogenic and movement artifacts were noted.      EEG Impression:  Abnormal VEEG due to the presence of:  1.	moderate- marked generalized slowing and background suppression  2.	Marked right hemisphere voltage suppression and right fronto-temporal PLEDs at a frequency of approximately 1 Hz gradually transitioning to higher amplitude theta activity with brief bursts of beta and admixed centro-temporal sharp waves.  3.	No clinical seizures    Clinical Correlation:  The study is consistent with a diffuse encephalopathy due to sedative medications and an acute right fronto-temporal injury either encephalitic or vascular. Some improvement in right hemisphere background over nigh†.      Lb Reyes MD  Director of Neurology, [facility]  Attending Epileptologist and , [facility]

## 2018-03-12 LAB
ALBUMIN SERPL ELPH-MCNC: 2.8 G/DL — LOW (ref 3.3–5.2)
ALP SERPL-CCNC: 82 U/L — SIGNIFICANT CHANGE UP (ref 40–120)
ALT FLD-CCNC: 15 U/L — SIGNIFICANT CHANGE UP
ANION GAP SERPL CALC-SCNC: 16 MMOL/L — SIGNIFICANT CHANGE UP (ref 5–17)
AST SERPL-CCNC: 26 U/L — SIGNIFICANT CHANGE UP
BILIRUB SERPL-MCNC: 1.4 MG/DL — SIGNIFICANT CHANGE UP (ref 0.4–2)
BUN SERPL-MCNC: 51 MG/DL — HIGH (ref 8–20)
CALCIUM SERPL-MCNC: 8.7 MG/DL — SIGNIFICANT CHANGE UP (ref 8.6–10.2)
CHLORIDE SERPL-SCNC: 101 MMOL/L — SIGNIFICANT CHANGE UP (ref 98–107)
CO2 SERPL-SCNC: 26 MMOL/L — SIGNIFICANT CHANGE UP (ref 22–29)
CREAT SERPL-MCNC: 2.49 MG/DL — HIGH (ref 0.5–1.3)
GLUCOSE BLDC GLUCOMTR-MCNC: 100 MG/DL — HIGH (ref 70–99)
GLUCOSE BLDC GLUCOMTR-MCNC: 106 MG/DL — HIGH (ref 70–99)
GLUCOSE BLDC GLUCOMTR-MCNC: 114 MG/DL — HIGH (ref 70–99)
GLUCOSE BLDC GLUCOMTR-MCNC: 118 MG/DL — HIGH (ref 70–99)
GLUCOSE BLDC GLUCOMTR-MCNC: 120 MG/DL — HIGH (ref 70–99)
GLUCOSE BLDC GLUCOMTR-MCNC: 120 MG/DL — HIGH (ref 70–99)
GLUCOSE BLDC GLUCOMTR-MCNC: 122 MG/DL — HIGH (ref 70–99)
GLUCOSE BLDC GLUCOMTR-MCNC: 124 MG/DL — HIGH (ref 70–99)
GLUCOSE BLDC GLUCOMTR-MCNC: 125 MG/DL — HIGH (ref 70–99)
GLUCOSE BLDC GLUCOMTR-MCNC: 134 MG/DL — HIGH (ref 70–99)
GLUCOSE BLDC GLUCOMTR-MCNC: 135 MG/DL — HIGH (ref 70–99)
GLUCOSE BLDC GLUCOMTR-MCNC: 136 MG/DL — HIGH (ref 70–99)
GLUCOSE BLDC GLUCOMTR-MCNC: 136 MG/DL — HIGH (ref 70–99)
GLUCOSE BLDC GLUCOMTR-MCNC: 138 MG/DL — HIGH (ref 70–99)
GLUCOSE BLDC GLUCOMTR-MCNC: 139 MG/DL — HIGH (ref 70–99)
GLUCOSE BLDC GLUCOMTR-MCNC: 145 MG/DL — HIGH (ref 70–99)
GLUCOSE BLDC GLUCOMTR-MCNC: 145 MG/DL — HIGH (ref 70–99)
GLUCOSE BLDC GLUCOMTR-MCNC: 147 MG/DL — HIGH (ref 70–99)
GLUCOSE BLDC GLUCOMTR-MCNC: 167 MG/DL — HIGH (ref 70–99)
GLUCOSE BLDC GLUCOMTR-MCNC: 188 MG/DL — HIGH (ref 70–99)
GLUCOSE BLDC GLUCOMTR-MCNC: 238 MG/DL — HIGH (ref 70–99)
GLUCOSE BLDC GLUCOMTR-MCNC: 91 MG/DL — SIGNIFICANT CHANGE UP (ref 70–99)
GLUCOSE BLDC GLUCOMTR-MCNC: 98 MG/DL — SIGNIFICANT CHANGE UP (ref 70–99)
GLUCOSE SERPL-MCNC: 146 MG/DL — HIGH (ref 70–115)
HCT VFR BLD CALC: 47.6 % — SIGNIFICANT CHANGE UP (ref 42–52)
HCT VFR BLD CALC: 47.7 % — SIGNIFICANT CHANGE UP (ref 42–52)
HGB BLD-MCNC: 15.7 G/DL — SIGNIFICANT CHANGE UP (ref 14–18)
HGB BLD-MCNC: 16.1 G/DL — SIGNIFICANT CHANGE UP (ref 14–18)
MAGNESIUM SERPL-MCNC: 1.8 MG/DL — SIGNIFICANT CHANGE UP (ref 1.8–2.6)
MCHC RBC-ENTMCNC: 27.9 PG — SIGNIFICANT CHANGE UP (ref 27–31)
MCHC RBC-ENTMCNC: 29.2 PG — SIGNIFICANT CHANGE UP (ref 27–31)
MCHC RBC-ENTMCNC: 32.9 G/DL — SIGNIFICANT CHANGE UP (ref 32–36)
MCHC RBC-ENTMCNC: 33.8 G/DL — SIGNIFICANT CHANGE UP (ref 32–36)
MCV RBC AUTO: 84.7 FL — SIGNIFICANT CHANGE UP (ref 80–94)
MCV RBC AUTO: 86.4 FL — SIGNIFICANT CHANGE UP (ref 80–94)
PHOSPHATE SERPL-MCNC: 3.1 MG/DL — SIGNIFICANT CHANGE UP (ref 2.4–4.7)
PLATELET # BLD AUTO: 133 K/UL — LOW (ref 150–400)
PLATELET # BLD AUTO: 160 K/UL — SIGNIFICANT CHANGE UP (ref 150–400)
POTASSIUM SERPL-MCNC: 3.6 MMOL/L — SIGNIFICANT CHANGE UP (ref 3.5–5.3)
POTASSIUM SERPL-SCNC: 3.6 MMOL/L — SIGNIFICANT CHANGE UP (ref 3.5–5.3)
PROT SERPL-MCNC: 6.4 G/DL — LOW (ref 6.6–8.7)
RBC # BLD: 5.51 M/UL — SIGNIFICANT CHANGE UP (ref 4.6–6.2)
RBC # BLD: 5.63 M/UL — SIGNIFICANT CHANGE UP (ref 4.6–6.2)
RBC # FLD: 14.4 % — SIGNIFICANT CHANGE UP (ref 11–15.6)
RBC # FLD: 14.4 % — SIGNIFICANT CHANGE UP (ref 11–15.6)
SODIUM SERPL-SCNC: 143 MMOL/L — SIGNIFICANT CHANGE UP (ref 135–145)
WBC # BLD: 16.6 K/UL — HIGH (ref 4.8–10.8)
WBC # BLD: 20.6 K/UL — HIGH (ref 4.8–10.8)
WBC # FLD AUTO: 16.6 K/UL — HIGH (ref 4.8–10.8)
WBC # FLD AUTO: 20.6 K/UL — HIGH (ref 4.8–10.8)

## 2018-03-12 PROCEDURE — 99231 SBSQ HOSP IP/OBS SF/LOW 25: CPT

## 2018-03-12 PROCEDURE — 99233 SBSQ HOSP IP/OBS HIGH 50: CPT

## 2018-03-12 PROCEDURE — 71045 X-RAY EXAM CHEST 1 VIEW: CPT | Mod: 26

## 2018-03-12 PROCEDURE — 70450 CT HEAD/BRAIN W/O DYE: CPT | Mod: 26

## 2018-03-12 RX ORDER — METOPROLOL TARTRATE 50 MG
12.5 TABLET ORAL EVERY 6 HOURS
Qty: 0 | Refills: 0 | Status: DISCONTINUED | OUTPATIENT
Start: 2018-03-12 | End: 2018-03-12

## 2018-03-12 RX ORDER — METOPROLOL TARTRATE 50 MG
2.5 TABLET ORAL EVERY 6 HOURS
Qty: 0 | Refills: 0 | Status: DISCONTINUED | OUTPATIENT
Start: 2018-03-12 | End: 2018-03-16

## 2018-03-12 RX ORDER — FUROSEMIDE 40 MG
40 TABLET ORAL DAILY
Qty: 0 | Refills: 0 | Status: DISCONTINUED | OUTPATIENT
Start: 2018-03-12 | End: 2018-03-21

## 2018-03-12 RX ADMIN — LEVETIRACETAM 400 MILLIGRAM(S): 250 TABLET, FILM COATED ORAL at 05:20

## 2018-03-12 RX ADMIN — DEXMEDETOMIDINE HYDROCHLORIDE IN 0.9% SODIUM CHLORIDE 12.24 MICROGRAM(S)/KG/HR: 4 INJECTION INTRAVENOUS at 22:48

## 2018-03-12 RX ADMIN — DEXMEDETOMIDINE HYDROCHLORIDE IN 0.9% SODIUM CHLORIDE 12.24 MICROGRAM(S)/KG/HR: 4 INJECTION INTRAVENOUS at 01:16

## 2018-03-12 RX ADMIN — SODIUM CHLORIDE 50 MILLILITER(S): 9 INJECTION, SOLUTION INTRAVENOUS at 03:37

## 2018-03-12 RX ADMIN — HEPARIN SODIUM 5000 UNIT(S): 5000 INJECTION INTRAVENOUS; SUBCUTANEOUS at 17:34

## 2018-03-12 RX ADMIN — Medication 267.6 MILLIGRAM(S): at 17:34

## 2018-03-12 RX ADMIN — HEPARIN SODIUM 5000 UNIT(S): 5000 INJECTION INTRAVENOUS; SUBCUTANEOUS at 05:21

## 2018-03-12 RX ADMIN — DEXMEDETOMIDINE HYDROCHLORIDE IN 0.9% SODIUM CHLORIDE 12.24 MICROGRAM(S)/KG/HR: 4 INJECTION INTRAVENOUS at 06:54

## 2018-03-12 RX ADMIN — LEVETIRACETAM 400 MILLIGRAM(S): 250 TABLET, FILM COATED ORAL at 16:12

## 2018-03-12 RX ADMIN — DEXMEDETOMIDINE HYDROCHLORIDE IN 0.9% SODIUM CHLORIDE 12.24 MICROGRAM(S)/KG/HR: 4 INJECTION INTRAVENOUS at 03:38

## 2018-03-12 RX ADMIN — Medication 40 MILLIGRAM(S): at 11:19

## 2018-03-12 RX ADMIN — Medication 267.6 MILLIGRAM(S): at 05:20

## 2018-03-12 RX ADMIN — Medication 2.5 MILLIGRAM(S): at 17:34

## 2018-03-12 NOTE — PROGRESS NOTE ADULT - SUBJECTIVE AND OBJECTIVE BOX
Patient is a 73y old  Male who presents with a chief complaint of Unresponsive (10 Mar 2018 05:56)      BRIEF HOSPITAL COURSE:  72 yo male, PMHx DM, HF, HTN, CHF EF 25%, presenting with seizure, was code stroke outsiode of window CTA and CT head neg, also head shingles 2 weeks ago,     Events last 24 hours: extubated yesterday, still periods of encephalopathy      PAST MEDICAL & SURGICAL HISTORY:  CHF (congestive heart failure)  HTN (hypertension)  Diabetes  No significant past surgical history      Review of Systems:  unable to obtain      Medications:  acyclovir IVPB      acyclovir IVPB 880 milliGRAM(s) IV Intermittent every 12 hours    furosemide   Injectable 40 milliGRAM(s) IV Push daily  metoprolol    tartrate Injectable 2.5 milliGRAM(s) IV Push every 6 hours      dexmedetomidine Infusion 0.4 MICROgram(s)/kG/Hr IV Continuous <Continuous>  fentaNYL    Injectable 50 MICROGram(s) IV Push every 1 hour PRN  levETIRAcetam  IVPB 750 milliGRAM(s) IV Intermittent every 12 hours      heparin  Injectable 5000 Unit(s) SubCutaneous every 12 hours        dextrose 50% Injectable 12.5 Gram(s) IV Push once  dextrose 50% Injectable 25 Gram(s) IV Push once  dextrose 50% Injectable 25 Gram(s) IV Push once  dextrose Gel 1 Dose(s) Oral once PRN  glucagon  Injectable 1 milliGRAM(s) IntraMuscular once PRN  insulin Infusion 6 Unit(s)/Hr IV Continuous <Continuous>    dextrose 5%. 1000 milliLiter(s) IV Continuous <Continuous>  multiple electrolytes Injection Type 1 1000 milliLiter(s) IV Continuous <Continuous>                ICU Vital Signs Last 24 Hrs  T(C): 37.1 (12 Mar 2018 16:00), Max: 37.5 (12 Mar 2018 00:12)  T(F): 98.7 (12 Mar 2018 16:00), Max: 99.5 (12 Mar 2018 00:12)  HR: 92 (12 Mar 2018 17:30) (74 - 132)  BP: 118/69 (12 Mar 2018 17:30) (97/56 - 182/85)  BP(mean): 88 (12 Mar 2018 17:30) (71 - 132)  ABP: --  ABP(mean): --  RR: 20 (12 Mar 2018 17:30) (19 - 34)  SpO2: 100% (12 Mar 2018 17:30) (92% - 100%)      ABG - ( 11 Mar 2018 22:17 )  pH: 7.41  /  pCO2: 41    /  pO2: 105   / HCO3: 25    / Base Excess: 0.8   /  SaO2: 98                  I&O's Detail    11 Mar 2018 07:01  -  12 Mar 2018 07:00  --------------------------------------------------------  IN:    dexmedetomidine Infusion: 222.6 mL    dexmedetomidine Infusion: 121.2 mL    Enteral Tube Flush: 20 mL    insulin Infusion: 65.5 mL    multiple electrolytes Injection Type 1: 1650 mL    Nepro: 350 mL    Solution: 250 mL    Solution: 100 mL  Total IN: 2779.3 mL    OUT:    Incontinent per Condom Catheter: 200 mL    Indwelling Catheter - Urethral: 505 mL  Total OUT: 705 mL    Total NET: 2074.3 mL      12 Mar 2018 07:01  -  12 Mar 2018 18:24  --------------------------------------------------------  IN:    dexmedetomidine Infusion: 168.6 mL    insulin Infusion: 14.5 mL    multiple electrolytes Injection Type 1: 550 mL  Total IN: 733.1 mL    OUT:    Voided: 550 mL  Total OUT: 550 mL    Total NET: 183.1 mL            LABS:                        16.1   16.6  )-----------( 133      ( 12 Mar 2018 08:59 )             47.6     03-12    143  |  101  |  51.0<H>  ----------------------------<  146<H>  3.6   |  26.0  |  2.49<H>    Ca    8.7      12 Mar 2018 08:59  Phos  3.1     03-12  Mg     1.8     03-12    TPro  6.4<L>  /  Alb  2.8<L>  /  TBili  1.4  /  DBili  x   /  AST  26  /  ALT  15  /  AlkPhos  82  03-12      CARDIAC MARKERS ( 11 Mar 2018 16:18 )  x     / 0.10 ng/mL / x     / x     / x          CAPILLARY BLOOD GLUCOSE  147 (12 Mar 2018 07:00)      POCT Blood Glucose.: 136 mg/dL (12 Mar 2018 17:29)        CULTURES:  Culture Results:   No growth at 2 days.  Culture in progress (03-10-18 @ 10:29)  Culture Results:   No growth at 48 hours (03-10-18 @ 10:05)  Culture Results:   No growth at 48 hours (03-10-18 @ 10:04)      Physical Examination:    General: No acute distress.  sedated due to agitation this am    HEENT: Pupils equal, reactive to light.  Symmetric.    PULM: Clear to auscultation bilaterally, no significant sputum production    CVS: Regular rate and rhythm, no murmurs, rubs, or gallops    ABD: Soft, nondistended, nontender, normoactive bowel sounds, no masses    EXT: No edema, nontender    SKIN: Warm and well perfused, no rashes noted.    RADIOLOGY: < from: CT Head No Cont (03.12.18 @ 11:39) >     EXAM:  CT BRAIN                          PROCEDURE DATE:  03/12/2018          INTERPRETATION:      CLINICAL INFORMATION:   ADMDIAG1: R41.82 MED EVAL/     TECHNIQUE: Contiguous non contrast axial images of brain from skull base   to vertex sagittaland coronal reformations.   This scan was performed   using automatic exposure control (radiation dose reduction software) to   obtain a diagnostic image quality scan with patient dose as low as   reasonably achievable.      COMPARISON: 3/9/2018     FINDINGS:       There is prominence of the ventricles and cortical sulci. Midline   structures are intact. There are patchy hypodensities in periventricular   distribution consistent with chronic small vessel ischemic changes. There   is no CT evidence of acute territorial infarction.  There is no   hemorrhage, contusion or extraaxial collection. There is no acute   hydrocephalus. The visualized posterior fossa structures are intact.   There are scattered intracranial arterial calcification.  The visualized   paranasal sinuses are clear.    IMPRESSION:       No parenchymal contusion, hemorrhage or extra-axial collection.    No CT evidence of an acute territorial infarction.    < end of copied text >

## 2018-03-12 NOTE — OCCUPATIONAL THERAPY INITIAL EVALUATION ADULT - RANGE OF MOTION EXAMINATION
pt intermittently squeezing therapist's hand; unable to follow commands to perform active ROM; passive ROM is [facility]

## 2018-03-12 NOTE — PROGRESS NOTE ADULT - SUBJECTIVE AND OBJECTIVE BOX
[facility]                                                                     NEUROLOGY AT [place]                                                                       Cate Estevez, Marco                                                                      [address]                                                                           [place], [place], [address]                                                                                 [phone]                                                                           Neurology Progress Note        No reported seizures  Extubated  On Precedex    Unresponsive to voice  Opens eyes to stimulation  Pupils =, reactive  Full  extraocular movements  Face symmetric.  Weak symmetrical movements to stimulation    Neuro status stable

## 2018-03-12 NOTE — PROGRESS NOTE ADULT - SUBJECTIVE AND OBJECTIVE BOX
Patient is a 73y old  Male who presents with a chief complaint of Unresponsive (10 Mar 2018 05:56)    PAST MEDICAL & SURGICAL HISTORY:  CHF (congestive heart failure)  HTN (hypertension)  Diabetes  No significant past surgical history    KATELYNN AGUILAR   73y    Male    BRIEF HOSPITAL COURSE:    74 yo male, PMHx DM, HF, HTN, who presented BIBA after being found unresponsive by wife at home. Per pt's wife, she returned home at approximately 16:45, patient was awake and alert, in usual state of health with no complaints. Pt ate dinner then was lying on the couch watching TV. When wife went to check on him, she noted him to be staring off to the left with his eyes open, not responding to verbal stimuli. She called her son, who then called 911. Timeline unclear: pt's wife states last known normal was approx 18:00, however pt's other son Tenzin who lives in the same house states his mother called him to check on the pt after finding him unresponsive around 20:00. Upon arrival to ED, /110, patient was unresponsive to verbal stimuli, and was intubated by Anesthesia for concerns of inability to protect airway and hypoxia. Patient was noted to have L cheek focal twitching, withdrew to noxious stimuli x 4 extremities, however had a deficit on LUE / LLE. A CODE STROKE was called, and tele neurology Dr. Wan was consulted. Initial CT head negative for acute hemorrhage or infarct, no visible MCA sign. After careful history-taking from wife and patient's sons, patient was deemed to be outside of the window for tPA. Patient has a PPM, and per pt's wife, is unable to have an MRI. Decision was made to obtain CTA head/neck despite BEVERLY on CKD, which was negative for acute occlusion. Patient was transferred to MICU for further management. Of note, patient's wife mentioned pt has been home with Shingles over the past 3 weeks.    s/p LP with some red cells ? traumatic.  ABX stopped no susp for bacterial CNS process.        Review of Systems:   UATO                        ICU Vital Signs Last 24 Hrs  T(C): 37.5 (12 Mar 2018 00:12), Max: 37.5 (12 Mar 2018 00:12)  T(F): 99.5 (12 Mar 2018 00:12), Max: 99.5 (12 Mar 2018 00:12)  HR: 85 (12 Mar 2018 04:00) (62 - 167)  BP: 102/56 (12 Mar 2018 04:00) (93/69 - 182/85)  BP(mean): 74 (12 Mar 2018 04:00) (74 - 132)  ABP: --  ABP(mean): --  RR: 22 (12 Mar 2018 04:00) (16 - 34)  SpO2: 99% (12 Mar 2018 03:45) (92% - 100%)    Physical Examination:    General:  NAD    HEENT: no JVD  EEG head dress.      PULM: bilateral BS    CVS: s1 s2 reg    ABD:  distended soft NT    EXT:     SKIN:  healed vesciles L side of leg    Neuro:    ABG - ( 11 Mar 2018 22:17 )  pH: 7.41  /  pCO2: 41    /  pO2: 105   / HCO3: 25    / Base Excess: 0.8   /  SaO2: 98                Mode: CPAP with PS  RR (machine): 18  FiO2: 30  PEEP: 5  PS: 5  MAP: 7    Mode: CPAP with PS, RR (machine): 18, FiO2: 30, PEEP: 5, PS: 5, MAP: 7  LABS:                        17.6   13.2  )-----------( 157      ( 11 Mar 2018 12:20 )             50.9     03-11    138  |  95<L>  |  47.0<H>  ----------------------------<  216<H>  3.3<L>   |  26.0  |  2.27<H>    Ca    9.0      11 Mar 2018 16:18  Phos  3.0     03-11  Mg     1.9     03-11    TPro  7.0  /  Alb  3.2<L>  /  TBili  1.0  /  DBili  x   /  AST  25  /  ALT  18  /  AlkPhos  89  03-11      CARDIAC MARKERS ( 11 Mar 2018 16:18 )  x     / 0.10 ng/mL / x     / x     / x      CARDIAC MARKERS ( 10 Mar 2018 04:21 )  x     / 0.11 ng/mL / 186 U/L / x     / 10.2 ng/mL      CAPILLARY BLOOD GLUCOSE  125 (12 Mar 2018 03:00)      POCT Blood Glucose.: 125 mg/dL (12 Mar 2018 03:02)        CULTURES:  Culture Results:   No growth at 1 day.  Culture in progress (03-10 @ 10:29)      Medications:  acyclovir IVPB      acyclovir IVPB 880 milliGRAM(s) IV Intermittent every 12 hour  dexmedetomidine Infusion 0.4 MICROgram(s)/kG/Hr IV Continuous <Continuous>  fentaNYL    Injectable 50 MICROGram(s) IV Push every 1 hour PRN  levETIRAcetam  IVPB 750 milliGRAM(s) IV Intermittent every 12 hours  heparin  Injectable 5000 Unit(s) SubCutaneous every 12 hours  pantoprazole  Injectable 40 milliGRAM(s) IV Push daily  dextrose 50% Injectable 12.5 Gram(s) IV Push once  dextrose 50% Injectable 25 Gram(s) IV Push once  dextrose 50% Injectable 25 Gram(s) IV Push once  dextrose Gel 1 Dose(s) Oral once PRN  glucagon  Injectable 1 milliGRAM(s) IntraMuscular once PRN  insulin Infusion 6 Unit(s)/Hr IV Continuous <Continuous>  dextrose 5%. 1000 milliLiter(s) IV Continuous <Continuous>  multiple electrolytes Injection Type 1 1000 milliLiter(s) IV Continuous <Continuous>        03-10 @ 06:01  -  03-11 @ 07:00  --------------------------------------------------------  IN: 5291.5 mL / OUT: 1895 mL / NET: 3396.5 mL    03-11 @ 07:01  -  03-12 @ 04:08  --------------------------------------------------------  IN: 2418.6 mL / OUT: 505 mL / NET: 1913.6 mL        RADIOLOGY/IMAGING/ECHO    < from: CT Angio Head w/ IV Cont (03.10.18 @ 00:24) >  IMPRESSION:  CTA head and neck:   No major vessel occlusion, hemodynamically significant stenosis,   dissection or aneurysm.  Congenital variation in anatomy as above.    < from: CT Head No Cont (03.09.18 @ 22:56) >  IMPRESSION:  No acute intracranial hemorrhage, mass effect or evidence for acute   territorial infarct. Mild chronic medical vascular change.    < end of copied text >      EEG  The study is consistent with a diffuse encephalopathy due to sedative medications and an acute right fronto-temporal injury either encephalitic or vascular. Some improvement in right hemisphere background over nigh†.          Assessment/Plan:    73M DM HTN  CKD CHF EF 25% (2O15)  admit with new onset SZ AMS intubated now extubated ,  Recent shingles  RX for meningoencephalitis, abx stopped continuing acyclovir.   Remains encephalopathic.  No SZ on EEG.  Continue Keppra     New onset sz r/o HSV encephalitis     Resp status  stable.  Still AMS not following commands.       Code nima called last PM  grabbing at nurse, tried to get OOB and nearly succeeded  Requiring wrist restraint and dexmedetomidine sedation    1:1 observation

## 2018-03-12 NOTE — PHYSICAL THERAPY INITIAL EVALUATION ADULT - IMPAIRMENTS FOUND, PT EVAL
poor safety awareness/arousal, attention, and cognition/ROM/cognitive impairment/aerobic capacity/endurance/gait, locomotion, and balance/muscle strength/posture/neuromotor development and sensory integration

## 2018-03-12 NOTE — PHYSICAL THERAPY INITIAL EVALUATION ADULT - PLANNED THERAPY INTERVENTIONS, PT EVAL
bed mobility training/strengthening/gait training/transfer training/motor coordination training/balance training/stretching/neuromuscular re-education/ROM

## 2018-03-12 NOTE — PHYSICAL THERAPY INITIAL EVALUATION ADULT - CRITERIA FOR SKILLED THERAPEUTIC INTERVENTIONS
therapy frequency/anticipated equipment needs at discharge/anticipated discharge recommendation/impairments found/functional limitations in following categories/predicted duration of therapy intervention/risk reduction/prevention/rehab potential

## 2018-03-12 NOTE — OCCUPATIONAL THERAPY INITIAL EVALUATION ADULT - PLANNED THERAPY INTERVENTIONS, OT EVAL
ADL retraining/balance training/bed mobility training/fine motor coordination training/motor coordination training/ROM/strengthening/neuromuscular re-education/transfer training

## 2018-03-13 DIAGNOSIS — N18.3 CHRONIC KIDNEY DISEASE, STAGE 3 (MODERATE): ICD-10-CM

## 2018-03-13 DIAGNOSIS — N18.9 CHRONIC KIDNEY DISEASE, UNSPECIFIED: ICD-10-CM

## 2018-03-13 DIAGNOSIS — G93.40 ENCEPHALOPATHY, UNSPECIFIED: ICD-10-CM

## 2018-03-13 LAB
ANION GAP SERPL CALC-SCNC: 20 MMOL/L — HIGH (ref 5–17)
BUN SERPL-MCNC: 55 MG/DL — HIGH (ref 8–20)
CALCIUM SERPL-MCNC: 9 MG/DL — SIGNIFICANT CHANGE UP (ref 8.6–10.2)
CHLORIDE SERPL-SCNC: 100 MMOL/L — SIGNIFICANT CHANGE UP (ref 98–107)
CO2 SERPL-SCNC: 23 MMOL/L — SIGNIFICANT CHANGE UP (ref 22–29)
CREAT SERPL-MCNC: 2.47 MG/DL — HIGH (ref 0.5–1.3)
CULTURE RESULTS: SIGNIFICANT CHANGE UP
GLUCOSE BLDC GLUCOMTR-MCNC: 108 MG/DL — HIGH (ref 70–99)
GLUCOSE BLDC GLUCOMTR-MCNC: 129 MG/DL — HIGH (ref 70–99)
GLUCOSE BLDC GLUCOMTR-MCNC: 149 MG/DL — HIGH (ref 70–99)
GLUCOSE BLDC GLUCOMTR-MCNC: 161 MG/DL — HIGH (ref 70–99)
GLUCOSE BLDC GLUCOMTR-MCNC: 191 MG/DL — HIGH (ref 70–99)
GLUCOSE BLDC GLUCOMTR-MCNC: 205 MG/DL — HIGH (ref 70–99)
GLUCOSE BLDC GLUCOMTR-MCNC: 230 MG/DL — HIGH (ref 70–99)
GLUCOSE BLDC GLUCOMTR-MCNC: 236 MG/DL — HIGH (ref 70–99)
GLUCOSE SERPL-MCNC: 185 MG/DL — HIGH (ref 70–115)
HCT VFR BLD CALC: 44.4 % — SIGNIFICANT CHANGE UP (ref 42–52)
HGB BLD-MCNC: 14.8 G/DL — SIGNIFICANT CHANGE UP (ref 14–18)
MAGNESIUM SERPL-MCNC: 1.9 MG/DL — SIGNIFICANT CHANGE UP (ref 1.6–2.6)
MCHC RBC-ENTMCNC: 28.8 PG — SIGNIFICANT CHANGE UP (ref 27–31)
MCHC RBC-ENTMCNC: 33.3 G/DL — SIGNIFICANT CHANGE UP (ref 32–36)
MCV RBC AUTO: 86.5 FL — SIGNIFICANT CHANGE UP (ref 80–94)
PHOSPHATE SERPL-MCNC: 4.1 MG/DL — SIGNIFICANT CHANGE UP (ref 2.4–4.7)
PLATELET # BLD AUTO: 172 K/UL — SIGNIFICANT CHANGE UP (ref 150–400)
POTASSIUM SERPL-MCNC: 4.2 MMOL/L — SIGNIFICANT CHANGE UP (ref 3.5–5.3)
POTASSIUM SERPL-SCNC: 4.2 MMOL/L — SIGNIFICANT CHANGE UP (ref 3.5–5.3)
RBC # BLD: 5.13 M/UL — SIGNIFICANT CHANGE UP (ref 4.6–6.2)
RBC # FLD: 14.8 % — SIGNIFICANT CHANGE UP (ref 11–15.6)
SODIUM SERPL-SCNC: 143 MMOL/L — SIGNIFICANT CHANGE UP (ref 135–145)
SOURCE VZV: SIGNIFICANT CHANGE UP
SPECIMEN SOURCE: SIGNIFICANT CHANGE UP
VZV DNA CSF NAA+PROBE-LOG#: SIGNIFICANT CHANGE UP
VZV PCR: SIGNIFICANT CHANGE UP
WBC # BLD: 13.5 K/UL — HIGH (ref 4.8–10.8)
WBC # FLD AUTO: 13.5 K/UL — HIGH (ref 4.8–10.8)

## 2018-03-13 PROCEDURE — 71045 X-RAY EXAM CHEST 1 VIEW: CPT | Mod: 26,76

## 2018-03-13 PROCEDURE — 99233 SBSQ HOSP IP/OBS HIGH 50: CPT

## 2018-03-13 PROCEDURE — 99223 1ST HOSP IP/OBS HIGH 75: CPT

## 2018-03-13 RX ORDER — INSULIN GLARGINE 100 [IU]/ML
20 INJECTION, SOLUTION SUBCUTANEOUS ONCE
Qty: 0 | Refills: 0 | Status: COMPLETED | OUTPATIENT
Start: 2018-03-13 | End: 2018-03-13

## 2018-03-13 RX ORDER — INSULIN GLARGINE 100 [IU]/ML
40 INJECTION, SOLUTION SUBCUTANEOUS
Qty: 0 | Refills: 0 | Status: DISCONTINUED | OUTPATIENT
Start: 2018-03-13 | End: 2018-03-24

## 2018-03-13 RX ORDER — INSULIN LISPRO 100/ML
VIAL (ML) SUBCUTANEOUS EVERY 4 HOURS
Qty: 0 | Refills: 0 | Status: DISCONTINUED | OUTPATIENT
Start: 2018-03-13 | End: 2018-03-15

## 2018-03-13 RX ORDER — QUETIAPINE FUMARATE 200 MG/1
50 TABLET, FILM COATED ORAL
Qty: 0 | Refills: 0 | Status: DISCONTINUED | OUTPATIENT
Start: 2018-03-13 | End: 2018-03-16

## 2018-03-13 RX ORDER — INSULIN LISPRO 100/ML
VIAL (ML) SUBCUTANEOUS EVERY 6 HOURS
Qty: 0 | Refills: 0 | Status: DISCONTINUED | OUTPATIENT
Start: 2018-03-13 | End: 2018-03-13

## 2018-03-13 RX ORDER — MAGNESIUM SULFATE 500 MG/ML
2 VIAL (ML) INJECTION ONCE
Qty: 0 | Refills: 0 | Status: COMPLETED | OUTPATIENT
Start: 2018-03-13 | End: 2018-03-13

## 2018-03-13 RX ADMIN — Medication 2.5 MILLIGRAM(S): at 01:03

## 2018-03-13 RX ADMIN — LEVETIRACETAM 400 MILLIGRAM(S): 250 TABLET, FILM COATED ORAL at 05:23

## 2018-03-13 RX ADMIN — DEXMEDETOMIDINE HYDROCHLORIDE IN 0.9% SODIUM CHLORIDE 12.24 MICROGRAM(S)/KG/HR: 4 INJECTION INTRAVENOUS at 05:24

## 2018-03-13 RX ADMIN — LEVETIRACETAM 400 MILLIGRAM(S): 250 TABLET, FILM COATED ORAL at 16:57

## 2018-03-13 RX ADMIN — Medication 267.6 MILLIGRAM(S): at 05:23

## 2018-03-13 RX ADMIN — DEXMEDETOMIDINE HYDROCHLORIDE IN 0.9% SODIUM CHLORIDE 12.24 MICROGRAM(S)/KG/HR: 4 INJECTION INTRAVENOUS at 23:16

## 2018-03-13 RX ADMIN — Medication 4: at 11:45

## 2018-03-13 RX ADMIN — HEPARIN SODIUM 5000 UNIT(S): 5000 INJECTION INTRAVENOUS; SUBCUTANEOUS at 18:03

## 2018-03-13 RX ADMIN — INSULIN GLARGINE 40 UNIT(S): 100 INJECTION, SOLUTION SUBCUTANEOUS at 23:17

## 2018-03-13 RX ADMIN — Medication 50 GRAM(S): at 11:45

## 2018-03-13 RX ADMIN — HEPARIN SODIUM 5000 UNIT(S): 5000 INJECTION INTRAVENOUS; SUBCUTANEOUS at 05:25

## 2018-03-13 RX ADMIN — INSULIN GLARGINE 20 UNIT(S): 100 INJECTION, SOLUTION SUBCUTANEOUS at 03:37

## 2018-03-13 RX ADMIN — DEXMEDETOMIDINE HYDROCHLORIDE IN 0.9% SODIUM CHLORIDE 12.24 MICROGRAM(S)/KG/HR: 4 INJECTION INTRAVENOUS at 01:14

## 2018-03-13 RX ADMIN — DEXMEDETOMIDINE HYDROCHLORIDE IN 0.9% SODIUM CHLORIDE 12.24 MICROGRAM(S)/KG/HR: 4 INJECTION INTRAVENOUS at 19:56

## 2018-03-13 RX ADMIN — Medication 2.5 MILLIGRAM(S): at 05:24

## 2018-03-13 RX ADMIN — Medication 267.6 MILLIGRAM(S): at 18:03

## 2018-03-13 RX ADMIN — Medication 2: at 06:17

## 2018-03-13 RX ADMIN — Medication 2.5 MILLIGRAM(S): at 23:53

## 2018-03-13 RX ADMIN — Medication 40 MILLIGRAM(S): at 05:24

## 2018-03-13 RX ADMIN — Medication 4: at 16:57

## 2018-03-13 RX ADMIN — Medication 2.5 MILLIGRAM(S): at 18:03

## 2018-03-13 RX ADMIN — Medication 2: at 23:53

## 2018-03-13 RX ADMIN — Medication 2.5 MILLIGRAM(S): at 11:45

## 2018-03-13 RX ADMIN — Medication 4: at 19:56

## 2018-03-13 RX ADMIN — SODIUM CHLORIDE 50 MILLILITER(S): 9 INJECTION, SOLUTION INTRAVENOUS at 23:16

## 2018-03-13 NOTE — PROGRESS NOTE ADULT - PROBLEM SELECTOR PLAN 3
Etiology unclear, likely post-ictal component. Continue Precedex for light sedation given recent episodes of acute agitation. Aspiration precautions. F/u VZW CSF PCR. Etiology unclear, likely post-ictal component. Continue Precedex for light sedation given recent episodes of acute agitation. Aspiration precautions. F/u VZV CSF PCR.

## 2018-03-13 NOTE — CONSULT NOTE ADULT - SUBJECTIVE AND OBJECTIVE BOX
[facility] INFECTIOUS DISEASES AND INTERNAL MEDICINE at [place]  =======================================================  Basilio Hoskins MD [facility]   Yonas Zavala MD  Diplomates American Board of Internal Medicine and Infectious Diseases  =======================================================    [id number]  KATELYNN AGUILAR   This 72 y/o M with DM, CHF, HTN, shingles who was brought in for unresponsive on 3/9/18, found with hypertesion with , was intubated for hypoxia and airway protection.  He was found  with left cheek focal twitching, withdrew to noxious stimuli x 4 extremities, deficit on LUE / LLE. Initial CT head negative for acute hemorrhage or infarct, no visible MCA sign, not a candidate for tPA. He later developed tonic-clonic seizure-like activity, no seizures on EEG. He was extubated on 3/11/18    Patient remains with encephalopathy, remains on 50% VM.  Workup is in process for a viral encephalitis.  CSF studies including CSF PCR, HSV 1/2 PCR, VZV PCR are negative.  Patient remains on acyclovir.     =======================================================  Past Medical & Surgical Hx:  =====================  PAST MEDICAL & SURGICAL HISTORY:  CHF (congestive heart failure)  HTN (hypertension)  Diabetes  No significant past surgical history      Problem List:  ==========  HEALTH ISSUES - PROBLEM Dx:  CKD (chronic kidney disease): CKD (chronic kidney disease)  Encephalopathy: Encephalopathy  Type 2 diabetes mellitus with complication, unspecified long term insulin use status: Type 2 diabetes mellitus with complication, unspecified long term insulin use status  Hyperglycemia: Hyperglycemia  BEVERLY (acute kidney injury): BEVERLY (acute kidney injury)  Acute respiratory failure with hypoxia: Acute respiratory failure with hypoxia  Essential hypertension: Essential hypertension  Seizure: Seizure  Encephalopathy acute: Encephalopathy acute  Cerebrovascular accident (CVA) due to embolism of right middle cerebral artery: Cerebrovascular accident (CVA) due to embolism of right middle cerebral artery          Social Hx:  =======  no toxic habits currently    Family History:  no significant family history of immunosuppressive disorders.       PAST MEDICAL & SURGICAL HISTORY:  CHF (congestive heart failure)  HTN (hypertension)  Diabetes  No significant past surgical history      Allergies    No Known Allergies    Intolerances        MEDICATIONS  (STANDING):  acyclovir IVPB      acyclovir IVPB 880 milliGRAM(s) IV Intermittent every 12 hours  dexmedetomidine Infusion 0.4 MICROgram(s)/kG/Hr (12.24 mL/Hr) IV Continuous <Continuous>  dextrose 5%. 1000 milliLiter(s) (50 mL/Hr) IV Continuous <Continuous>  dextrose 50% Injectable 12.5 Gram(s) IV Push once  dextrose 50% Injectable 25 Gram(s) IV Push once  dextrose 50% Injectable 25 Gram(s) IV Push once  furosemide   Injectable 40 milliGRAM(s) IV Push daily  heparin  Injectable 5000 Unit(s) SubCutaneous every 12 hours  insulin glargine Injectable (LANTUS) 40 Unit(s) SubCutaneous two times a day  insulin lispro (HumaLOG) corrective regimen sliding scale   SubCutaneous every 4 hours  levETIRAcetam  IVPB 750 milliGRAM(s) IV Intermittent every 12 hours  metoprolol    tartrate Injectable 2.5 milliGRAM(s) IV Push every 6 hours  multiple electrolytes Injection Type 1 1000 milliLiter(s) (50 mL/Hr) IV Continuous <Continuous>    MEDICATIONS  (PRN):  dextrose Gel 1 Dose(s) Oral once PRN Blood Glucose LESS THAN 70 milliGRAM(s)/deciLiter  fentaNYL    Injectable 50 MICROGram(s) IV Push every 1 hour PRN Moderate Pain (4 - 6)  glucagon  Injectable 1 milliGRAM(s) IntraMuscular once PRN Glucose <70 milliGRAM(s)/deciLiter        =======================================================  REVIEW OF SYSTEMS:  Constitutional: No fever, No chills, No sweats.  Eye: No icterus, No double vision.  Ear/Nose/Mouth/Throat: No nasal congestion, No sore throat.  Respiratory: No shortness of breath, No cough, No sputum production, No wheezing.  Cardiovascular: No chest pain, No palpitations, No syncope.  Gastrointestinal: No nausea, No vomiting, No diarrhea, No abdominal pain.  Genitourinary: No dysuria, No hematuria, No change in urine stream.  Hematology/Lymphatics: No bleeding tendency.  Endocrine: No excessive thirst, No polyuria.  Immunologic: No malaise.  Musculoskeletal: No back pain, No neck pain, No joint pain, No muscle pain.  Integumentary: No rash, No pruritus, No skin lesion.  Neurologic: No numbness, No tingling, No headache.  Psychiatric: No depression.    ======================================================  Physical Exam:  ============  Vital Signs Last 24 Hrs  T(C): 37.3 (13 Mar 2018 12:00), Max: 37.6 (13 Mar 2018 08:00)  T(F): 99.1 (13 Mar 2018 12:00), Max: 99.7 (13 Mar 2018 08:00)  HR: 84 (13 Mar 2018 14:00) (73 - 105)  BP: 148/89 (13 Mar 2018 14:00) (117/64 - 168/123)  BP(mean): 114 (13 Mar 2018 14:00) (84 - 132)  RR: 22 (13 Mar 2018 14:00) (15 - 30)  SpO2: 100% (13 Mar 2018 14:00) (88% - 100%)    General: Alert and oriented, No acute distress.  Eye: Pupils are equal, round and reactive to light, Extraocular movements are intact, Normal conjunctiva.  HENT: Normocephalic, Oral mucosa is moist, No pharyngeal erythema, No sinus tenderness.  Neck: Supple, No lymphadenopathy.  Respiratory: Lungs are clear to auscultation, Respirations are non-labored.  Cardiovascular: Normal rate, Regular rhythm, No murmur, Good pulses equal in all extremities, No edema.  Gastrointestinal: Soft, Non-tender, Non-distended, Normal bowel sounds.  Genitourinary: No costovertebral angle tenderness.  Lymphatics: No lymphadenopathy neck, axilla, groin.  Musculoskeletal: Normal range of motion, Normal strength.  Integumentary: No rash.  Neurologic: Alert, Oriented, No focal deficits, Cranial Nerves II-XII are grossly intact.  Psychiatric: Appropriate mood & affect.      =======================================================  Labs:  ====  03-13    143  |  100  |  55.0<H>  ----------------------------<  185<H>  4.2   |  23.0  |  2.47<H>    Ca    9.0      13 Mar 2018 09:18  Phos  4.1     03-13  Mg     1.9     03-13    TPro  6.4<L>  /  Alb  2.8<L>  /  TBili  1.4  /  DBili  x   /  AST  26  /  ALT  15  /  AlkPhos  82  03-12                          14.8   13.5  )-----------( 172      ( 13 Mar 2018 07:36 )             44.4           LIVER FUNCTIONS - ( 12 Mar 2018 08:59 )  Alb: 2.8 g/dL / Pro: 6.4 g/dL / ALK PHOS: 82 U/L / ALT: 15 U/L / AST: 26 U/L / GGT: x           CARDIAC MARKERS ( 11 Mar 2018 16:18 )  x     / 0.10 ng/mL / x     / x     / x          CAPILLARY BLOOD GLUCOSE      POCT Blood Glucose.: 236 mg/dL (13 Mar 2018 11:44)  POCT Blood Glucose.: 161 mg/dL (13 Mar 2018 06:12)  POCT Blood Glucose.: 149 mg/dL (13 Mar 2018 02:04)  POCT Blood Glucose.: 129 mg/dL (13 Mar 2018 01:10)  POCT Blood Glucose.: 108 mg/dL (12 Mar 2018 23:57)  POCT Blood Glucose.: 106 mg/dL (12 Mar 2018 23:29)  POCT Blood Glucose.: 120 mg/dL (12 Mar 2018 22:16)  POCT Blood Glucose.: 138 mg/dL (12 Mar 2018 20:03)  POCT Blood Glucose.: 139 mg/dL (12 Mar 2018 19:00)  POCT Blood Glucose.: 167 mg/dL (12 Mar 2018 18:17)  POCT Blood Glucose.: 136 mg/dL (12 Mar 2018 17:29)  POCT Blood Glucose.: 100 mg/dL (12 Mar 2018 17:07)  POCT Blood Glucose.: 122 mg/dL (12 Mar 2018 16:04)  POCT Blood Glucose.: 120 mg/dL (12 Mar 2018 15:17)    ABG - ( 11 Mar 2018 22:17 )  pH: 7.41  /  pCO2: 41    /  pO2: 105   / HCO3: 25    / Base Excess: 0.8   /  SaO2: 98                  RECENT CULTURES:  03-10 @ 10:29 .CSF CSF     No growth at 3 days.    No White blood cells  No organisms seen      03-10 @ 10:05 .Blood Blood-Peripheral     No growth at 48 hours        03-10 @ 10:04 .Blood Blood-Peripheral     No growth at 48 hours [facility] DISEASES AND INTERNAL MEDICINE at [place]  =======================================================  Basilio Hoskins MD [facility]   Yonas Zavala MD  Diplomates American Board of Internal Medicine and Infectious Diseases  =======================================================    [id number]  KATELYNN AGUILAR   This 74 y/o M with DM, CHF, HTN, shingles who was brought in for unresponsive on 3/9/18, found with hypertesion with , was intubated for hypoxia and airway protection.  He was found  with left cheek focal twitching, withdrew to noxious stimuli x 4 extremities, deficit on LUE / LLE. Initial CT head negative for acute hemorrhage or infarct, no visible MCA sign, not a candidate for tPA. He later developed tonic-clonic seizure-like activity, no seizures on EEG. He was extubated on 3/11/18    Patient remains with encephalopathy, remains on 50% VM.  Workup is in process for a viral encephalitis.  CSF studies including CSF PCR, HSV 1/2 PCR, VZV PCR are negative.    patient is currently not completely responsive.    patient cannot provided history.   =======================================================  Past Medical & Surgical Hx:  =====================  PAST MEDICAL & SURGICAL HISTORY:  CHF (congestive heart failure)  HTN (hypertension)  Diabetes  No significant past surgical history      Problem List:  ==========  HEALTH ISSUES - PROBLEM Dx:  CKD (chronic kidney disease): CKD (chronic kidney disease)  Encephalopathy: Encephalopathy  Type 2 diabetes mellitus with complication, unspecified long term insulin use status: Type 2 diabetes mellitus with complication, unspecified long term insulin use status  Hyperglycemia: Hyperglycemia  BEVERLY (acute kidney injury): BEVERLY (acute kidney injury)  Acute respiratory failure with hypoxia: Acute respiratory failure with hypoxia  Essential hypertension: Essential hypertension  Seizure: Seizure  Encephalopathy acute: Encephalopathy acute  Cerebrovascular accident (CVA) due to embolism of right middle cerebral artery: Cerebrovascular accident (CVA) due to embolism of right middle cerebral artery    Social Hx:  =======  no toxic habits currently    Family History:  no significant family history of immunosuppressive disorders.     PAST MEDICAL & SURGICAL HISTORY:  CHF (congestive heart failure)  HTN (hypertension)  Diabetes  No significant past surgical history      Allergies  No Known Allergies  Intolerances        MEDICATIONS  (STANDING):  acyclovir IVPB      acyclovir IVPB 880 milliGRAM(s) IV Intermittent every 12 hours  dexmedetomidine Infusion 0.4 MICROgram(s)/kG/Hr (12.24 mL/Hr) IV Continuous <Continuous>  dextrose 5%. 1000 milliLiter(s) (50 mL/Hr) IV Continuous <Continuous>  dextrose 50% Injectable 12.5 Gram(s) IV Push once  dextrose 50% Injectable 25 Gram(s) IV Push once  dextrose 50% Injectable 25 Gram(s) IV Push once  furosemide   Injectable 40 milliGRAM(s) IV Push daily  heparin  Injectable 5000 Unit(s) SubCutaneous every 12 hours  insulin glargine Injectable (LANTUS) 40 Unit(s) SubCutaneous two times a day  insulin lispro (HumaLOG) corrective regimen sliding scale   SubCutaneous every 4 hours  levETIRAcetam  IVPB 750 milliGRAM(s) IV Intermittent every 12 hours  metoprolol    tartrate Injectable 2.5 milliGRAM(s) IV Push every 6 hours  multiple electrolytes Injection Type 1 1000 milliLiter(s) (50 mL/Hr) IV Continuous <Continuous>      =======================================================  REVIEW OF SYSTEMS:  UNABLE TO OBTAIN    ======================================================  Physical Exam:  ============  Vital Signs Last 24 Hrs  T(C): 37.3 (13 Mar 2018 12:00), Max: 37.6 (13 Mar 2018 08:00)  T(F): 99.1 (13 Mar 2018 12:00), Max: 99.7 (13 Mar 2018 08:00)  HR: 84 (13 Mar 2018 14:00) (73 - 105)  BP: 148/89 (13 Mar 2018 14:00) (117/64 - 168/123)  BP(mean): 114 (13 Mar 2018 14:00) (84 - 132)  RR: 22 (13 Mar 2018 14:00) (15 - 30)  SpO2: 100% (13 Mar 2018 14:00) (88% - 100%)    General: LETHARGIC, No acute distress.  Eye: Pupils are equal, round and reactive to light, Extraocular movements are intact, MUDDY conjunctiva.  HENT: Normocephalic, Oral mucosa is moist, No pharyngeal erythema, No sinus tenderness.  Neck: Supple, No lymphadenopathy.  Respiratory: GOOD AIR ENTRY ANTERIORLY.  Cardiovascular: Normal rate, Regular rhythm, No murmur, Good pulses equal in all extremities, No edema.  Gastrointestinal: OBESE ABD, ACTIVE BOWEL SOUNDS.   Genitourinary: No costovertebral angle tenderness.  Lymphatics: No lymphadenopathy neck, axilla, groin.  Musculoskeletal: WITHDRAWS TO PAIN  Integumentary: No rash.  Neurologic: AWAKE, NON COMMUNICATIVE      =======================================================  Labs:  ====  03-13    143  |  100  |  55.0<H>  ----------------------------<  185<H>  4.2   |  23.0  |  2.47<H>    Ca    9.0      13 Mar 2018 09:18  Phos  4.1     03-13  Mg     1.9     03-13    TPro  6.4<L>  /  Alb  2.8<L>  /  TBili  1.4  /  DBili  x   /  AST  26  /  ALT  15  /  AlkPhos  82  03-12                          14.8   13.5  )-----------( 172      ( 13 Mar 2018 07:36 )             44.4     LIVER FUNCTIONS - ( 12 Mar 2018 08:59 )  Alb: 2.8 g/dL / Pro: 6.4 g/dL / ALK PHOS: 82 U/L / ALT: 15 U/L / AST: 26 U/L / GGT: x           ABG - ( 11 Mar 2018 22:17 )  pH: 7.41  /  pCO2: 41    /  pO2: 105   / HCO3: 25    / Base Excess: 0.8   /  SaO2: 98        RECENT CULTURES:  03-10 @ 10:29 .CSF CSF     No growth at 3 days.    No White blood cells  No organisms seen    03-10 @ 10:05 .Blood Blood-Peripheral     No growth at 48 hours        03-10 @ 10:04 .Blood Blood-Peripheral     No growth at 48 hours

## 2018-03-13 NOTE — PROGRESS NOTE ADULT - SUBJECTIVE AND OBJECTIVE BOX
Patient is a 73y old  Male who presents with a chief complaint of Unresponsive (10 Mar 2018 05:56)      BRIEF HOSPITAL COURSE: 74 y/o M with a h/o DM, CHF, HTN, shingles over the past 3 months, who presented BIBA after being found unresponsive by wife at home. Upon arrival to ED, /110, patient was unresponsive to verbal stimuli, and was intubated by Anesthesia for concerns of inability to protect airway and hypoxia. Patient was noted to have L cheek focal twitching, withdrew to noxious stimuli x 4 extremities, however had a deficit on LUE / LLE. Initial CT head negative for acute hemorrhage or infarct, no visible MCA sign. Deemed not a candidate for tPA. Later developed tonic-clonic seizure activity. EEG performed without seizure activity noted. Concern for meningoencephalitis, however, LP unremarkable. Extubated on 3/11.    Events last 24 hours: Patient remains severely encephalopathic, not answering questions or following commands, not managing secretions well, desaturating earlier in shift requiring 50% VM.    PAST MEDICAL & SURGICAL HISTORY:  CHF (congestive heart failure)  HTN (hypertension)  Diabetes  No significant past surgical history      Review of Systems:  CONSTITUTIONAL: No fever, chills, or fatigue  EYES: No eye pain, visual disturbances, or discharge  ENMT:  No difficulty hearing, tinnitus, vertigo; No sinus or throat pain  NECK: No pain or stiffness  RESPIRATORY: No cough, wheezing, chills or hemoptysis; No shortness of breath  CARDIOVASCULAR: No chest pain, palpitations, dizziness, or leg swelling  GASTROINTESTINAL: No abdominal or epigastric pain. No nausea, vomiting, or hematemesis; No diarrhea or constipation. No melena or hematochezia.  GENITOURINARY: No dysuria, frequency, hematuria, or incontinence  NEUROLOGICAL: No headaches, memory loss, loss of strength, numbness, or tremors  SKIN: No itching, burning, rashes, or lesions   MUSCULOSKELETAL: No joint pain or swelling; No muscle, back, or extremity pain  PSYCHIATRIC: No depression, anxiety, mood swings, or difficulty sleeping      Medications:  acyclovir IVPB      acyclovir IVPB 880 milliGRAM(s) IV Intermittent every 12 hours    furosemide   Injectable 40 milliGRAM(s) IV Push daily  metoprolol    tartrate Injectable 2.5 milliGRAM(s) IV Push every 6 hours      dexmedetomidine Infusion 0.4 MICROgram(s)/kG/Hr IV Continuous <Continuous>  fentaNYL    Injectable 50 MICROGram(s) IV Push every 1 hour PRN  levETIRAcetam  IVPB 750 milliGRAM(s) IV Intermittent every 12 hours      heparin  Injectable 5000 Unit(s) SubCutaneous every 12 hours        dextrose 50% Injectable 12.5 Gram(s) IV Push once  dextrose 50% Injectable 25 Gram(s) IV Push once  dextrose 50% Injectable 25 Gram(s) IV Push once  dextrose Gel 1 Dose(s) Oral once PRN  glucagon  Injectable 1 milliGRAM(s) IntraMuscular once PRN  insulin Infusion 6 Unit(s)/Hr IV Continuous <Continuous>    dextrose 5%. 1000 milliLiter(s) IV Continuous <Continuous>  multiple electrolytes Injection Type 1 1000 milliLiter(s) IV Continuous <Continuous>                ICU Vital Signs Last 24 Hrs  T(C): 36.3 (13 Mar 2018 00:00), Max: 37.3 (12 Mar 2018 04:00)  T(F): 97.4 (13 Mar 2018 00:00), Max: 99.2 (12 Mar 2018 04:00)  HR: 88 (13 Mar 2018 01:00) (74 - 105)  BP: 121/69 (13 Mar 2018 01:00) (97/56 - 168/123)  BP(mean): 90 (13 Mar 2018 01:00) (71 - 132)  ABP: --  ABP(mean): --  RR: 19 (13 Mar 2018 01:00) (16 - 28)  SpO2: 98% (13 Mar 2018 01:00) (88% - 100%)      ABG - ( 11 Mar 2018 22:17 )  pH: 7.41  /  pCO2: 41    /  pO2: 105   / HCO3: 25    / Base Excess: 0.8   /  SaO2: 98                  I&O's Detail    11 Mar 2018 07:01  -  12 Mar 2018 07:00  --------------------------------------------------------  IN:    dexmedetomidine Infusion: 222.6 mL    dexmedetomidine Infusion: 121.2 mL    Enteral Tube Flush: 20 mL    insulin Infusion: 65.5 mL    multiple electrolytes Injection Type 1: 1650 mL    Nepro: 350 mL    Solution: 250 mL    Solution: 100 mL  Total IN: 2779.3 mL    OUT:    Incontinent per Condom Catheter: 200 mL    Indwelling Catheter - Urethral: 505 mL  Total OUT: 705 mL    Total NET: 2074.3 mL      12 Mar 2018 07:01  -  13 Mar 2018 02:02  --------------------------------------------------------  IN:    dexmedetomidine Infusion: 271.5 mL    insulin Infusion: 21 mL    multiple electrolytes Injection Type 1: 900 mL  Total IN: 1192.5 mL    OUT:    Voided: 550 mL  Total OUT: 550 mL    Total NET: 642.5 mL            LABS:                        16.1   16.6  )-----------( 133      ( 12 Mar 2018 08:59 )             47.6     03-12    143  |  101  |  51.0<H>  ----------------------------<  146<H>  3.6   |  26.0  |  2.49<H>    Ca    8.7      12 Mar 2018 08:59  Phos  3.1     03-12  Mg     1.8     03-12    TPro  6.4<L>  /  Alb  2.8<L>  /  TBili  1.4  /  DBili  x   /  AST  26  /  ALT  15  /  AlkPhos  82  03-12      CARDIAC MARKERS ( 11 Mar 2018 16:18 )  x     / 0.10 ng/mL / x     / x     / x          CAPILLARY BLOOD GLUCOSE  147 (12 Mar 2018 07:00)      POCT Blood Glucose.: 129 mg/dL (13 Mar 2018 01:10)        CULTURES:  Culture Results:   No growth at 2 days.  Culture in progress (03-10-18 @ 10:29)  Culture Results:   No growth at 48 hours (03-10-18 @ 10:05)  Culture Results:   No growth at 48 hours (03-10-18 @ 10:04)      Physical Examination:    General: No acute distress.  Alert, oriented, interactive, nonfocal    HEENT: Pupils equal, reactive to light.  Symmetric.    PULM: Clear to auscultation bilaterally, no significant sputum production    CVS: Regular rate and rhythm, no murmurs, rubs, or gallops    ABD: Soft, nondistended, nontender, normoactive bowel sounds, no masses    EXT: No edema, nontender    SKIN: Warm and well perfused, no rashes noted.    NEURO: A&Ox3, strength 5/5 all extremities, cranial nerves grossly intact, no focal deficits    RADIOLOGY: ***    CRITICAL CARE TIME SPENT: ***  Evaluating/treating patient, reviewing data/labs/imaging, discussing case with multidisciplinary team, discussing plan/goals of care with patient/family. Non-inclusive of procedure time. Patient is a 73y old  Male who presents with a chief complaint of Unresponsive (10 Mar 2018 05:56)      BRIEF HOSPITAL COURSE: 72 y/o M with a h/o DM, CHF, HTN, shingles over the past 3 months, who presented BIBA after being found unresponsive by wife at home. Upon arrival to ED, /110, patient was unresponsive to verbal stimuli, and was intubated by Anesthesia for concerns of inability to protect airway and hypoxia. Patient was noted to have L cheek focal twitching, withdrew to noxious stimuli x 4 extremities, however had a deficit on LUE / LLE. Initial CT head negative for acute hemorrhage or infarct, no visible MCA sign. Deemed not a candidate for tPA. Later developed tonic-clonic seizure activity. EEG performed without seizure activity noted. Concern for meningoencephalitis, however, LP unremarkable. Extubated on 3/11.    Events last 24 hours: Patient remains severely encephalopathic, not answering questions or following commands, not managing secretions well, desaturating earlier in shift requiring 50% VM. Hemodynamically stable. Renal failure appears to be chronic when comparing to old labs.    PAST MEDICAL & SURGICAL HISTORY:  CHF (congestive heart failure)  HTN (hypertension)  Diabetes  No significant past surgical history      Review of Systems: Unable to obtain secondary to mental status.      Medications:  acyclovir IVPB      acyclovir IVPB 880 milliGRAM(s) IV Intermittent every 12 hours  furosemide   Injectable 40 milliGRAM(s) IV Push daily  metoprolol    tartrate Injectable 2.5 milliGRAM(s) IV Push every 6 hours  dexmedetomidine Infusion 0.4 MICROgram(s)/kG/Hr IV Continuous <Continuous>  fentaNYL    Injectable 50 MICROGram(s) IV Push every 1 hour PRN  levETIRAcetam  IVPB 750 milliGRAM(s) IV Intermittent every 12 hours  heparin  Injectable 5000 Unit(s) SubCutaneous every 12 hours  dextrose 50% Injectable 12.5 Gram(s) IV Push once  dextrose 50% Injectable 25 Gram(s) IV Push once  dextrose 50% Injectable 25 Gram(s) IV Push once  dextrose Gel 1 Dose(s) Oral once PRN  glucagon  Injectable 1 milliGRAM(s) IntraMuscular once PRN  insulin Infusion 6 Unit(s)/Hr IV Continuous <Continuous>  dextrose 5%. 1000 milliLiter(s) IV Continuous <Continuous>  multiple electrolytes Injection Type 1 1000 milliLiter(s) IV Continuous <Continuous>        ICU Vital Signs Last 24 Hrs  T(C): 36.3 (13 Mar 2018 00:00), Max: 37.3 (12 Mar 2018 04:00)  T(F): 97.4 (13 Mar 2018 00:00), Max: 99.2 (12 Mar 2018 04:00)  HR: 88 (13 Mar 2018 01:00) (74 - 105)  BP: 121/69 (13 Mar 2018 01:00) (97/56 - 168/123)  BP(mean): 90 (13 Mar 2018 01:00) (71 - 132)  ABP: --  ABP(mean): --  RR: 19 (13 Mar 2018 01:00) (16 - 28)  SpO2: 98% (13 Mar 2018 01:00) (88% - 100%)      ABG - ( 11 Mar 2018 22:17 )  pH: 7.41  /  pCO2: 41    /  pO2: 105   / HCO3: 25    / Base Excess: 0.8   /  SaO2: 98                  I&O's Detail    11 Mar 2018 07:01  -  12 Mar 2018 07:00  --------------------------------------------------------  IN:    dexmedetomidine Infusion: 222.6 mL    dexmedetomidine Infusion: 121.2 mL    Enteral Tube Flush: 20 mL    insulin Infusion: 65.5 mL    multiple electrolytes Injection Type 1: 1650 mL    Nepro: 350 mL    Solution: 250 mL    Solution: 100 mL  Total IN: 2779.3 mL    OUT:    Incontinent per Condom Catheter: 200 mL    Indwelling Catheter - Urethral: 505 mL  Total OUT: 705 mL    Total NET: 2074.3 mL      12 Mar 2018 07:01  -  13 Mar 2018 02:02  --------------------------------------------------------  IN:    dexmedetomidine Infusion: 271.5 mL    insulin Infusion: 21 mL    multiple electrolytes Injection Type 1: 900 mL  Total IN: 1192.5 mL    OUT:    Voided: 550 mL  Total OUT: 550 mL    Total NET: 642.5 mL            LABS:                        16.1   16.6  )-----------( 133      ( 12 Mar 2018 08:59 )             47.6     03-12    143  |  101  |  51.0<H>  ----------------------------<  146<H>  3.6   |  26.0  |  2.49<H>    Ca    8.7      12 Mar 2018 08:59  Phos  3.1     03-12  Mg     1.8     03-12    TPro  6.4<L>  /  Alb  2.8<L>  /  TBili  1.4  /  DBili  x   /  AST  26  /  ALT  15  /  AlkPhos  82  03-12      CARDIAC MARKERS ( 11 Mar 2018 16:18 )  x     / 0.10 ng/mL / x     / x     / x          CAPILLARY BLOOD GLUCOSE  147 (12 Mar 2018 07:00)      POCT Blood Glucose.: 129 mg/dL (13 Mar 2018 01:10)        CULTURES:  Culture Results:   No growth at 2 days.  Culture in progress (03-10-18 @ 10:29)  Culture Results:   No growth at 48 hours (03-10-18 @ 10:05)  Culture Results:   No growth at 48 hours (03-10-18 @ 10:04)      Physical Examination:    General: No acute distress. encephalopathic, lethargic, on VM mask    HEENT: Pupils equal, reactive to light.  Symmetric.    PULM: Clear to auscultation bilaterally, copious secretions in oral cavity and oropharynx    CVS: Regular rate and rhythm, no murmurs, rubs, or gallops    ABD: Soft, nondistended, nontender, normoactive bowel sounds, no masses    EXT: No edema, nontender    SKIN: Warm and well perfused, no rashes noted.    NEURO: A&Ox0, not answering questions or following commands, moves all extremities spontaneously, difficulty managing secretions      RADIOLOGY:     < from: CT Head No Cont (03.12.18 @ 11:39) >  FINDINGS:       There is prominence of the ventricles and cortical sulci. Midline   structures are intact. There are patchy hypodensities in periventricular   distribution consistent with chronic small vessel ischemic changes. There   is no CT evidence of acute territorial infarction.  There is no   hemorrhage, contusion or extraaxial collection. There is no acute   hydrocephalus. The visualized posterior fossa structures are intact.   There are scattered intracranial arterial calcification.  The visualized   paranasal sinuses are clear.    IMPRESSION:       No parenchymal contusion, hemorrhage or extra-axial collection.    No CT evidence of an acute territorial infarction.      < from: CT Angio Head w/ IV Cont (03.10.18 @ 00:24) >  FINDINGS:    CTA neck:  There is a bovine configuration to the great vessels as they arise from   the aortic arch with the left common carotid artery arises from the   brachiocephalic artery and not the arch itself. The arch and origins of   the great vessels are patent.    Both common carotid arteries take a normal course and caliber throughout   the cervical region. There is slightly higher bifurcation into the   internal and external carotid arteries on the left. There is mild   calcific atherosclerotic plaque at the origin of the right internal   carotid artery contributing to mild luminal stenosis. Otherwise, cervical   internal carotid artery segments bilaterally and the external carotid   arteries branches visualized appear unremarkable.  No evidence of focal   occlusion, hemodynamically significant stenosis or dissection of the   anterior carotid circulation within the neck.    Origins of both vertebral arteries are identified and widely patent. The   vertebral arteries are codominant and overall diminutive in caliber. The   take anormal course throughout the cervical region without focal   occlusion, hemodynamically significant stenosis or dissection is seen in   the posterior vertebral circulation within the neck.    There is no evidence of vascular malformation in the neck.There is no   abnormal AV shunting.    The patient is intubated at time of examination. There are secretions in   the trachea below the tip of the ET tube. There is no significant   adenopathy in the neck. There is paraseptal emphysema in the lung apices.    There are no acute osseous abnormalities.      CTA brain:  The cervical, petrous, lacerum, of both internal carotid arteries are   widely patent and of normal course and caliber throughout the skull base.   There are extensive calcifications involving both cavernous segments with   severe luminal stenosis seen on the right. There is severe focal   narrowing of the clinoid segment of the left internal carotid artery. The   middle and anterior cerebral arteries are widely patent and demonstrate   symmetric arborization without focal occlusion, hemodynamically   significant stenosis or aneurysm. Anterior communicating artery is   unremarkable.    The the intradural vertebral arteries remain codominant and overall   diminutive in caliber.Both posterior inferior cerebellar arteries are   identified and unremarkable. The basilar artery and its tributaries,   including the posterior cerebral arteries are widely patent and normal in   caliber. Both posterior communicating arteries are identified and   unremarkable. No focal occlusion, hemodynamically significant stenosis or   aneurysm is seen in the posterior vertebral basilar circulation.    The major dural venous sinuses and peripheral venous tributaries enhance   appropriately forphase of contrast administration. There is no abnormal   intracranial enhancement. There is no evidence of a vascular malformation.      IMPRESSION:  CTA head and neck:   No major vessel occlusion, hemodynamically significant stenosis,   dissection or aneurysm.  Congenital variation in anatomy as above.      < from: Xray Chest 1 View AP/PA. (03.09.18 @ 22:38) >  FINDINGS:    The lungs  are clear.  No pleural abnormality is seen.         The  heart is enlarged in transverse diameter. No hilar mass. Trachea   midline.  ET tube tip above tracheal bifurcation.  NG tube tip beyond GE junction.    . Cardiac device wire leads are within right atrium and right ventricle.   . Visualized osseous structures are intact.        IMPRESSION:   No evidence of active chest disease.        Catheters in place. Cardiomegaly.          CRITICAL CARE TIME SPENT: 44 mins  Evaluating/treating patient, reviewing data/labs/imaging, discussing case with multidisciplinary team, discussing plan/goals of care with patient/family. Non-inclusive of procedure time.

## 2018-03-13 NOTE — PROGRESS NOTE ADULT - PROBLEM SELECTOR PLAN 2
Unclear etiology. R/o viral encephalitis. Continue Keppra. Will use IV ativan to suppress further seizure activity. Seizure precautions.

## 2018-03-13 NOTE — PROGRESS NOTE ADULT - PROBLEM SELECTOR PLAN 6
BUN/Cr around baseline after reviewing old lab values. Continue to trend. Monitor lytes. Bladder scanned and 800+cc noted. Has been repetitively straight cathed. Suspect urinary retention, possible BPH. Will insert christopher, especially since patient is being routinely diuresed.

## 2018-03-13 NOTE — PROGRESS NOTE ADULT - SUBJECTIVE AND OBJECTIVE BOX
Patient is a 73y old  Male who presents with a chief complaint of Unresponsive (10 Mar 2018 05:56)      BRIEF HOSPITAL COURSE: 74 yo male, PMHx DM, HF, HTN, CHF EF 25%, presenting with seizure, was code stroke outsiode of window CTA and CT head neg, also head shingles 2 weeks ago,     Events last 24 hours: still periods of encephalopathy    PAST MEDICAL & SURGICAL HISTORY:  CHF (congestive heart failure)  HTN (hypertension)  Diabetes  No significant past surgical history      Review of Systems:  limited due to mental status      Medications:  acyclovir IVPB      acyclovir IVPB 880 milliGRAM(s) IV Intermittent every 12 hours    furosemide   Injectable 40 milliGRAM(s) IV Push daily  metoprolol    tartrate Injectable 2.5 milliGRAM(s) IV Push every 6 hours      dexmedetomidine Infusion 0.4 MICROgram(s)/kG/Hr IV Continuous <Continuous>  fentaNYL    Injectable 50 MICROGram(s) IV Push every 1 hour PRN  levETIRAcetam  IVPB 750 milliGRAM(s) IV Intermittent every 12 hours  heparin  Injectable 5000 Unit(s) SubCutaneous every 12 hours  dextrose 50% Injectable 12.5 Gram(s) IV Push once  dextrose 50% Injectable 25 Gram(s) IV Push once  dextrose 50% Injectable 25 Gram(s) IV Push once  dextrose Gel 1 Dose(s) Oral once PRN  glucagon  Injectable 1 milliGRAM(s) IntraMuscular once PRN  insulin glargine Injectable (LANTUS) 40 Unit(s) SubCutaneous two times a day  insulin lispro (HumaLOG) corrective regimen sliding scale   SubCutaneous every 4 hours    dextrose 5%. 1000 milliLiter(s) IV Continuous <Continuous>  multiple electrolytes Injection Type 1 1000 milliLiter(s) IV Continuous <Continuous>    ICU Vital Signs Last 24 Hrs  T(C): 37.5 (13 Mar 2018 16:00), Max: 37.6 (13 Mar 2018 08:00)  T(F): 99.5 (13 Mar 2018 16:00), Max: 99.7 (13 Mar 2018 08:00)  HR: 92 (13 Mar 2018 18:00) (73 - 105)  BP: 158/87 (13 Mar 2018 18:00) (121/69 - 168/123)  BP(mean): 116 (13 Mar 2018 18:00) (90 - 132)  ABP: --  ABP(mean): --  RR: 24 (13 Mar 2018 18:00) (15 - 30)  SpO2: 100% (13 Mar 2018 18:00) (88% - 100%)      ABG - ( 11 Mar 2018 22:17 )  pH: 7.41  /  pCO2: 41    /  pO2: 105   / HCO3: 25    / Base Excess: 0.8   /  SaO2: 98          I&O's Detail    12 Mar 2018 07:01  -  13 Mar 2018 07:00  --------------------------------------------------------  IN:    dexmedetomidine Infusion: 380.1 mL    insulin Infusion: 21 mL    multiple electrolytes Injection Type 1: 1200 mL  Total IN: 1601.1 mL    OUT:    Indwelling Catheter - Urethral: 310 mL    Voided: 1450 mL  Total OUT: 1760 mL    Total NET: -158.9 mL      13 Mar 2018 07:01  -  13 Mar 2018 18:27  --------------------------------------------------------  IN:    dexmedetomidine Infusion: 90 mL    multiple electrolytes Injection Type 1: 200 mL    Solution: 100 mL    Solution: 50 mL  Total IN: 440 mL    OUT:    Indwelling Catheter - Urethral: 1180 mL  Total OUT: 1180 mL    Total NET: -740 mL            LABS:                        14.8   13.5  )-----------( 172      ( 13 Mar 2018 07:36 )             44.4     03-13    143  |  100  |  55.0<H>  ----------------------------<  185<H>  4.2   |  23.0  |  2.47<H>    Ca    9.0      13 Mar 2018 09:18  Phos  4.1     03-13  Mg     1.9     03-13    TPro  6.4<L>  /  Alb  2.8<L>  /  TBili  1.4  /  DBili  x   /  AST  26  /  ALT  15  /  AlkPhos  82  03-12          CAPILLARY BLOOD GLUCOSE  147 (12 Mar 2018 07:00)      POCT Blood Glucose.: 205 mg/dL (13 Mar 2018 16:52)        CULTURES:  Culture Results:   No growth at 3 days. (03-10-18 @ 10:29)  Culture Results:   No growth at 48 hours (03-10-18 @ 10:05)  Culture Results:   No growth at 48 hours (03-10-18 @ 10:04)      Physical Examination:    General: No acute distress.  Alert, oriented, interactive, nonfocal    HEENT: Pupils equal, reactive to light.  Symmetric.    PULM: Clear to auscultation bilaterally, no significant sputum production    CVS: Regular rate and rhythm, no murmurs, rubs, or gallops    ABD: Soft, nondistended, nontender, normoactive bowel sounds, no masses    EXT: No edema, nontender    SKIN: Warm and well perfused, no rashes noted.    RADIOLOGY: < from: CT Head No Cont (03.12.18 @ 11:39) >   EXAM:  CT BRAIN                          PROCEDURE DATE:  03/12/2018          INTERPRETATION:      CLINICAL INFORMATION:   ADMDIAG1: R41.82 MED EVAL/     TECHNIQUE: Contiguous non contrast axial images of brain from skull base   to vertex sag[place] coronal reformations.   This scan was performed   using automatic exposure control (radiation dose reduction software) to   obtain a diagnostic image quality scan with patient dose as low as   reasonably achievable.      COMPARISON: 3/9/2018     FINDINGS:       There is prominence of the ventricles and cortical sulci. Midline   structures are intact. There are patchy hypodensities in periventricular   distribution consistent with chronic small vessel ischemic changes. There   is no CT evidence of acute territorial infarction.  There is no   hemorrhage, contusion or extraaxial collection. There is no acute   hydrocephalus. The visualized posterior fossa structures are intact.   There are scattered intracranial arterial calcification.  The visualized   paranasal sinuses are clear.    IMPRESSION:       No parenchymal contusion, hemorrhage or extra-axial collection.    No CT evidence of an acute territorial infarction.

## 2018-03-13 NOTE — PROGRESS NOTE ADULT - PROBLEM SELECTOR PLAN 1
Related to AMS and inability to protect airway. Extubated successfully on 3/11, however, remains severely encephalopathic with difficulty managing secretions and periods of desaturation. Continue 50% VM for now, titrating to maintain SaO2 > 92%. At this point in time, patient is not a candidate for BiPAP secondary to mental status and poor management of copious secretions- if respiratory status continues to worsen will have to re-intubate- critical airway so will involve anesthesia.

## 2018-03-13 NOTE — PROGRESS NOTE ADULT - PROBLEM SELECTOR PLAN 5
BG mostly controlled in low-100's on low dose insulin infusion. Past few hours gtt has been held as per protocol. Will attempt to bridge patient off infusion with Lantus and start q 6 hour ISS. Keep NPO secondary to mental status.

## 2018-03-14 LAB
AMMONIA BLD-MCNC: 38 UMOL/L — SIGNIFICANT CHANGE UP (ref 11–55)
ANION GAP SERPL CALC-SCNC: 14 MMOL/L — SIGNIFICANT CHANGE UP (ref 5–17)
BUN SERPL-MCNC: 59 MG/DL — HIGH (ref 8–20)
CALCIUM SERPL-MCNC: 9.4 MG/DL — SIGNIFICANT CHANGE UP (ref 8.6–10.2)
CHLORIDE SERPL-SCNC: 103 MMOL/L — SIGNIFICANT CHANGE UP (ref 98–107)
CO2 SERPL-SCNC: 26 MMOL/L — SIGNIFICANT CHANGE UP (ref 22–29)
CREAT SERPL-MCNC: 2.33 MG/DL — HIGH (ref 0.5–1.3)
GLUCOSE BLDC GLUCOMTR-MCNC: 112 MG/DL — HIGH (ref 70–99)
GLUCOSE BLDC GLUCOMTR-MCNC: 125 MG/DL — HIGH (ref 70–99)
GLUCOSE BLDC GLUCOMTR-MCNC: 136 MG/DL — HIGH (ref 70–99)
GLUCOSE BLDC GLUCOMTR-MCNC: 139 MG/DL — HIGH (ref 70–99)
GLUCOSE BLDC GLUCOMTR-MCNC: 92 MG/DL — SIGNIFICANT CHANGE UP (ref 70–99)
GLUCOSE SERPL-MCNC: 149 MG/DL — HIGH (ref 70–115)
HCT VFR BLD CALC: 41.9 % — LOW (ref 42–52)
HGB BLD-MCNC: 13.9 G/DL — LOW (ref 14–18)
MAGNESIUM SERPL-MCNC: 2.2 MG/DL — SIGNIFICANT CHANGE UP (ref 1.6–2.6)
MCHC RBC-ENTMCNC: 28 PG — SIGNIFICANT CHANGE UP (ref 27–31)
MCHC RBC-ENTMCNC: 33.2 G/DL — SIGNIFICANT CHANGE UP (ref 32–36)
MCV RBC AUTO: 84.3 FL — SIGNIFICANT CHANGE UP (ref 80–94)
PHOSPHATE SERPL-MCNC: 2.7 MG/DL — SIGNIFICANT CHANGE UP (ref 2.4–4.7)
PLATELET # BLD AUTO: 187 K/UL — SIGNIFICANT CHANGE UP (ref 150–400)
POTASSIUM SERPL-MCNC: 3.8 MMOL/L — SIGNIFICANT CHANGE UP (ref 3.5–5.3)
POTASSIUM SERPL-SCNC: 3.8 MMOL/L — SIGNIFICANT CHANGE UP (ref 3.5–5.3)
RBC # BLD: 4.97 M/UL — SIGNIFICANT CHANGE UP (ref 4.6–6.2)
RBC # FLD: 14.1 % — SIGNIFICANT CHANGE UP (ref 11–15.6)
SODIUM SERPL-SCNC: 143 MMOL/L — SIGNIFICANT CHANGE UP (ref 135–145)
WBC # BLD: 11.6 K/UL — HIGH (ref 4.8–10.8)
WBC # FLD AUTO: 11.6 K/UL — HIGH (ref 4.8–10.8)

## 2018-03-14 PROCEDURE — 99233 SBSQ HOSP IP/OBS HIGH 50: CPT

## 2018-03-14 PROCEDURE — 99231 SBSQ HOSP IP/OBS SF/LOW 25: CPT

## 2018-03-14 RX ADMIN — INSULIN GLARGINE 40 UNIT(S): 100 INJECTION, SOLUTION SUBCUTANEOUS at 08:16

## 2018-03-14 RX ADMIN — INSULIN GLARGINE 40 UNIT(S): 100 INJECTION, SOLUTION SUBCUTANEOUS at 21:59

## 2018-03-14 RX ADMIN — DEXMEDETOMIDINE HYDROCHLORIDE IN 0.9% SODIUM CHLORIDE 12.24 MICROGRAM(S)/KG/HR: 4 INJECTION INTRAVENOUS at 09:19

## 2018-03-14 RX ADMIN — DEXMEDETOMIDINE HYDROCHLORIDE IN 0.9% SODIUM CHLORIDE 12.24 MICROGRAM(S)/KG/HR: 4 INJECTION INTRAVENOUS at 16:38

## 2018-03-14 RX ADMIN — SODIUM CHLORIDE 50 MILLILITER(S): 9 INJECTION, SOLUTION INTRAVENOUS at 21:06

## 2018-03-14 RX ADMIN — DEXMEDETOMIDINE HYDROCHLORIDE IN 0.9% SODIUM CHLORIDE 12.24 MICROGRAM(S)/KG/HR: 4 INJECTION INTRAVENOUS at 04:30

## 2018-03-14 RX ADMIN — DEXMEDETOMIDINE HYDROCHLORIDE IN 0.9% SODIUM CHLORIDE 12.24 MICROGRAM(S)/KG/HR: 4 INJECTION INTRAVENOUS at 21:05

## 2018-03-14 RX ADMIN — LEVETIRACETAM 400 MILLIGRAM(S): 250 TABLET, FILM COATED ORAL at 16:44

## 2018-03-14 RX ADMIN — DEXMEDETOMIDINE HYDROCHLORIDE IN 0.9% SODIUM CHLORIDE 12.24 MICROGRAM(S)/KG/HR: 4 INJECTION INTRAVENOUS at 01:45

## 2018-03-14 RX ADMIN — Medication 2.5 MILLIGRAM(S): at 18:40

## 2018-03-14 RX ADMIN — LEVETIRACETAM 400 MILLIGRAM(S): 250 TABLET, FILM COATED ORAL at 03:47

## 2018-03-14 RX ADMIN — Medication 267.6 MILLIGRAM(S): at 18:40

## 2018-03-14 RX ADMIN — DEXMEDETOMIDINE HYDROCHLORIDE IN 0.9% SODIUM CHLORIDE 12.24 MICROGRAM(S)/KG/HR: 4 INJECTION INTRAVENOUS at 06:38

## 2018-03-14 RX ADMIN — Medication 40 MILLIGRAM(S): at 05:41

## 2018-03-14 RX ADMIN — HEPARIN SODIUM 5000 UNIT(S): 5000 INJECTION INTRAVENOUS; SUBCUTANEOUS at 18:41

## 2018-03-14 RX ADMIN — Medication 2.5 MILLIGRAM(S): at 05:40

## 2018-03-14 RX ADMIN — Medication 2.5 MILLIGRAM(S): at 11:38

## 2018-03-14 RX ADMIN — Medication 267.6 MILLIGRAM(S): at 05:41

## 2018-03-14 RX ADMIN — HEPARIN SODIUM 5000 UNIT(S): 5000 INJECTION INTRAVENOUS; SUBCUTANEOUS at 05:41

## 2018-03-14 NOTE — PROGRESS NOTE ADULT - PROBLEM SELECTOR PLAN 1
Initial CSF studies unremarkable. VZV CSF PCR sent in setting of ongoing shingles- neg. HSV CSF PCR neg as well. Acyclovir continues. F/u RPR and VDRL. Encephalopathy persists.

## 2018-03-14 NOTE — PROGRESS NOTE ADULT - PROBLEM SELECTOR PLAN 3
Related to AMS and inability to protect airway. Extubated successfully on 3/11, however, remains severely encephalopathic with difficulty managing secretions and periods of desaturation. Weaned from VM to NC, titrating to maintain SaO2 > 92%. At this point in time, patient is not a candidate for BiPAP secondary to mental status and poor management of copious secretions- if loses ability to protect airway adequately and decompensates from a respiratory standpoint will have to re-intubate- critical airway so will involve anesthesia.

## 2018-03-14 NOTE — PROGRESS NOTE ADULT - SUBJECTIVE AND OBJECTIVE BOX
[facility]                                     Neurology at [place]                                 [facility]                                  [address]                                        [place], [place], [address]                                             [phone]      Vital signs:  T(C): 36.5 (03-14-18 @ 13:00), Max: 37.5 (03-13-18 @ 16:00)  HR: 67 (03-14-18 @ 14:00) (60 - 92)  BP: 128/86 (03-14-18 @ 14:00) (108/65 - 158/87)  RR: 14 (03-14-18 @ 14:00) (10 - 24)  SpO2: 100% (03-14-18 @ 14:00) (100% - 100%)  Wt(kg): --    Exam:    No new complaints.  remains confused/lethargic/sedated  Pupils react.  Face symmetric  squeezes with right hand only  winces to pinch x 4 ext

## 2018-03-14 NOTE — PROGRESS NOTE ADULT - PROBLEM SELECTOR PLAN 6
BUN/Cr around baseline- will trend. Monitor lytes. Monitor urine output. Avoid nephrotoxic agents. pt with urinary retention will keep christopher at present pt unable to take any meds like flomax to help with BPH

## 2018-03-14 NOTE — PROGRESS NOTE ADULT - SUBJECTIVE AND OBJECTIVE BOX
Patient is a 73y old  Male who presents with a chief complaint of Unresponsive (10 Mar 2018 05:56)      BRIEF HOSPITAL COURSE: ***    Events last 24 hours: ***    PAST MEDICAL & SURGICAL HISTORY:  CHF (congestive heart failure)  HTN (hypertension)  Diabetes  No significant past surgical history      Review of Systems:  Cannot be obtained pt is encephalopathic       Medications:  acyclovir IVPB      acyclovir IVPB 880 milliGRAM(s) IV Intermittent every 12 hours    furosemide   Injectable 40 milliGRAM(s) IV Push daily  metoprolol    tartrate Injectable 2.5 milliGRAM(s) IV Push every 6 hours      dexmedetomidine Infusion 0.4 MICROgram(s)/kG/Hr IV Continuous <Continuous>  fentaNYL    Injectable 50 MICROGram(s) IV Push every 1 hour PRN  levETIRAcetam  IVPB 750 milliGRAM(s) IV Intermittent every 12 hours  QUEtiapine 50 milliGRAM(s) Oral two times a day      heparin  Injectable 5000 Unit(s) SubCutaneous every 12 hours        dextrose 50% Injectable 12.5 Gram(s) IV Push once  dextrose 50% Injectable 25 Gram(s) IV Push once  dextrose 50% Injectable 25 Gram(s) IV Push once  dextrose Gel 1 Dose(s) Oral once PRN  glucagon  Injectable 1 milliGRAM(s) IntraMuscular once PRN  insulin glargine Injectable (LANTUS) 40 Unit(s) SubCutaneous two times a day  insulin lispro (HumaLOG) corrective regimen sliding scale   SubCutaneous every 4 hours    dextrose 5%. 1000 milliLiter(s) IV Continuous <Continuous>  multiple electrolytes Injection Type 1 1000 milliLiter(s) IV Continuous <Continuous>                ICU Vital Signs Last 24 Hrs  T(C): 36.5 (14 Mar 2018 13:00), Max: 37.4 (14 Mar 2018 00:06)  T(F): 97.7 (14 Mar 2018 13:00), Max: 99.3 (14 Mar 2018 00:06)  HR: 67 (14 Mar 2018 14:00) (60 - 92)  BP: 128/86 (14 Mar 2018 14:00) (108/65 - 158/87)  BP(mean): 102 (14 Mar 2018 14:00) (81 - 116)  ABP: --  ABP(mean): --  RR: 14 (14 Mar 2018 14:00) (10 - 24)  SpO2: 100% (14 Mar 2018 14:00) (100% - 100%)          I&O's Detail    13 Mar 2018 07:01  -  14 Mar 2018 07:00  --------------------------------------------------------  IN:    dexmedetomidine Infusion: 327 mL    multiple electrolytes Injection Type 1: 800 mL    Solution: 250 mL    Solution: 50 mL    Solution: 200 mL  Total IN: 1627 mL    OUT:    Indwelling Catheter - Urethral: 2445 mL  Total OUT: 2445 mL    Total NET: -818 mL      14 Mar 2018 07:01  -  14 Mar 2018 16:49  --------------------------------------------------------  IN:    dexmedetomidine Infusion: 90 mL    multiple electrolytes Injection Type 1: 350 mL  Total IN: 440 mL    OUT:    Indwelling Catheter - Urethral: 875 mL  Total OUT: 875 mL    Total NET: -435 mL            LABS:                        13.9   11.6  )-----------( 187      ( 14 Mar 2018 09:15 )             41.9     03-14    143  |  103  |  59.0<H>  ----------------------------<  149<H>  3.8   |  26.0  |  2.33<H>    Ca    9.4      14 Mar 2018 09:15  Phos  2.7     03-14  Mg     2.2     03-14            CAPILLARY BLOOD GLUCOSE  136 (14 Mar 2018 08:00)      POCT Blood Glucose.: 92 mg/dL (14 Mar 2018 15:42)        CULTURES:  Culture Results:   No growth at 3 days. (03-10 @ 10:29)  Culture Results:   No growth at 48 hours (03-10 @ 10:05)  Culture Results:   No growth at 48 hours (03-10 @ 10:04)      Physical Examination:    General: No acute distress.      HEENT: Pupils equal, reactive to light.  Symmetric.    PULM: Clear to auscultation bilaterally, no significant sputum production    CVS: Regular rate and rhythm, no murmurs, rubs, or gallops    ABD: Soft, nondistended, nontender, normoactive bowel sounds, no masses    EXT: No edema, nontender    SKIN: Warm and well perfused, no rashes noted.    NEURO: Alert, oriented, interactive, nonfocal    RADIOLOGY: ***    CRITICAL CARE TIME SPENT: *** Patient is a 73y old  Male who presents with a chief complaint of Unresponsive (10 Mar 2018 05:56)      BRIEF HOSPITAL COURSE:  74 y/o M with a h/o DM, CHF, HTN, CKD, shingles over the past 3 months, who presented BIBA after being found unresponsive by wife at home. Upon arrival to ED, /110, patient was unresponsive to verbal stimuli, and was intubated by Anesthesia for concerns of inability to protect airway and hypoxia. Patient was noted to have L cheek focal twitching, withdrew to noxious stimuli x 4 extremities, however had a deficit on LUE / LLE. Initial CT head negative for acute hemorrhage or infarct, no visible MCA sign. Deemed not a candidate for tPA. Later developed tonic-clonic seizure activity. EEG performed without seizure activity noted. Concern for meningoencephalitis, however, LP unremarkable. Extubated on 3/11.    Events last 24 hours: Patient remains severely encephalopathic,       PAST MEDICAL & SURGICAL HISTORY:  CHF (congestive heart failure)  HTN (hypertension)  Diabetes  No significant past surgical history      Review of Systems:  Cannot be obtained pt is encephalopathic       Medications:  acyclovir IVPB      acyclovir IVPB 880 milliGRAM(s) IV Intermittent every 12 hours    furosemide   Injectable 40 milliGRAM(s) IV Push daily  metoprolol    tartrate Injectable 2.5 milliGRAM(s) IV Push every 6 hours      dexmedetomidine Infusion 0.4 MICROgram(s)/kG/Hr IV Continuous <Continuous>  fentaNYL    Injectable 50 MICROGram(s) IV Push every 1 hour PRN  levETIRAcetam  IVPB 750 milliGRAM(s) IV Intermittent every 12 hours  QUEtiapine 50 milliGRAM(s) Oral two times a day      heparin  Injectable 5000 Unit(s) SubCutaneous every 12 hours        dextrose 50% Injectable 12.5 Gram(s) IV Push once  dextrose 50% Injectable 25 Gram(s) IV Push once  dextrose 50% Injectable 25 Gram(s) IV Push once  dextrose Gel 1 Dose(s) Oral once PRN  glucagon  Injectable 1 milliGRAM(s) IntraMuscular once PRN  insulin glargine Injectable (LANTUS) 40 Unit(s) SubCutaneous two times a day  insulin lispro (HumaLOG) corrective regimen sliding scale   SubCutaneous every 4 hours    dextrose 5%. 1000 milliLiter(s) IV Continuous <Continuous>  multiple electrolytes Injection Type 1 1000 milliLiter(s) IV Continuous <Continuous>                ICU Vital Signs Last 24 Hrs  T(C): 36.5 (14 Mar 2018 13:00), Max: 37.4 (14 Mar 2018 00:06)  T(F): 97.7 (14 Mar 2018 13:00), Max: 99.3 (14 Mar 2018 00:06)  HR: 67 (14 Mar 2018 14:00) (60 - 92)  BP: 128/86 (14 Mar 2018 14:00) (108/65 - 158/87)  BP(mean): 102 (14 Mar 2018 14:00) (81 - 116)  ABP: --  ABP(mean): --  RR: 14 (14 Mar 2018 14:00) (10 - 24)  SpO2: 100% (14 Mar 2018 14:00) (100% - 100%)          I&O's Detail    13 Mar 2018 07:01  -  14 Mar 2018 07:00  --------------------------------------------------------  IN:    dexmedetomidine Infusion: 327 mL    multiple electrolytes Injection Type 1: 800 mL    Solution: 250 mL    Solution: 50 mL    Solution: 200 mL  Total IN: 1627 mL    OUT:    Indwelling Catheter - Urethral: 2445 mL  Total OUT: 2445 mL    Total NET: -818 mL      14 Mar 2018 07:01  -  14 Mar 2018 16:49  --------------------------------------------------------  IN:    dexmedetomidine Infusion: 90 mL    multiple electrolytes Injection Type 1: 350 mL  Total IN: 440 mL    OUT:    Indwelling Catheter - Urethral: 875 mL  Total OUT: 875 mL    Total NET: -435 mL            LABS:                        13.9   11.6  )-----------( 187      ( 14 Mar 2018 09:15 )             41.9     03-14    143  |  103  |  59.0<H>  ----------------------------<  149<H>  3.8   |  26.0  |  2.33<H>    Ca    9.4      14 Mar 2018 09:15  Phos  2.7     03-14  Mg     2.2     03-14            CAPILLARY BLOOD GLUCOSE  136 (14 Mar 2018 08:00)      POCT Blood Glucose.: 92 mg/dL (14 Mar 2018 15:42)        CULTURES:  Culture Results:   No growth at 3 days. (03-10 @ 10:29)  Culture Results:   No growth at 48 hours (03-10 @ 10:05)  Culture Results:   No growth at 48 hours (03-10 @ 10:04)      Physical Examination:    General: No acute distress.      HEENT: Pupils equal, reactive to light.  Symmetric.    PULM: Clear to auscultation bilaterally, no significant sputum production    CVS: Regular rate and rhythm, no murmurs, rubs, or gallops    ABD: Soft, nondistended, nontender, normoactive bowel sounds, no masses    EXT: No edema, nontender    SKIN: Warm and well perfused, no rashes noted.    NEURO: Alert, oriented, interactive, nonfocal    RADIOLOGY: ***    CRITICAL CARE TIME SPENT: *** Patient is a 73y old  Male who presents with a chief complaint of Unresponsive (10 Mar 2018 05:56)      BRIEF HOSPITAL COURSE:  72 y/o M with a h/o DM, CHF, HTN, CKD, shingles over the past 3 months, who presented BIBA after being found unresponsive by wife at home. Upon arrival to ED, /110, patient was unresponsive to verbal stimuli, and was intubated by Anesthesia for concerns of inability to protect airway and hypoxia. Patient was noted to have L cheek focal twitching, withdrew to noxious stimuli x 4 extremities, however had a deficit on LUE / LLE. Initial CT head negative for acute hemorrhage or infarct, no visible MCA sign. Deemed not a candidate for tPA. Later developed tonic-clonic seizure activity. EEG performed without seizure activity noted. Concern for meningoencephalitis, however, LP unremarkable. Extubated on 3/11.    Events last 24 hours: Patient remains severely encephalopathic, combative at times requiring restraints and precedex      PAST MEDICAL & SURGICAL HISTORY:  CHF (congestive heart failure)  HTN (hypertension)  Diabetes  No significant past surgical history      Review of Systems:  Cannot be obtained pt is encephalopathic       Medications:  acyclovir IVPB      acyclovir IVPB 880 milliGRAM(s) IV Intermittent every 12 hours    furosemide   Injectable 40 milliGRAM(s) IV Push daily  metoprolol    tartrate Injectable 2.5 milliGRAM(s) IV Push every 6 hours      dexmedetomidine Infusion 0.4 MICROgram(s)/kG/Hr IV Continuous <Continuous>  fentaNYL    Injectable 50 MICROGram(s) IV Push every 1 hour PRN  levETIRAcetam  IVPB 750 milliGRAM(s) IV Intermittent every 12 hours  QUEtiapine 50 milliGRAM(s) Oral two times a day      heparin  Injectable 5000 Unit(s) SubCutaneous every 12 hours        dextrose 50% Injectable 12.5 Gram(s) IV Push once  dextrose 50% Injectable 25 Gram(s) IV Push once  dextrose 50% Injectable 25 Gram(s) IV Push once  dextrose Gel 1 Dose(s) Oral once PRN  glucagon  Injectable 1 milliGRAM(s) IntraMuscular once PRN  insulin glargine Injectable (LANTUS) 40 Unit(s) SubCutaneous two times a day  insulin lispro (HumaLOG) corrective regimen sliding scale   SubCutaneous every 4 hours    dextrose 5%. 1000 milliLiter(s) IV Continuous <Continuous>  multiple electrolytes Injection Type 1 1000 milliLiter(s) IV Continuous <Continuous>                ICU Vital Signs Last 24 Hrs  T(C): 36.5 (14 Mar 2018 13:00), Max: 37.4 (14 Mar 2018 00:06)  T(F): 97.7 (14 Mar 2018 13:00), Max: 99.3 (14 Mar 2018 00:06)  HR: 67 (14 Mar 2018 14:00) (60 - 92)  BP: 128/86 (14 Mar 2018 14:00) (108/65 - 158/87)  BP(mean): 102 (14 Mar 2018 14:00) (81 - 116)  ABP: --  ABP(mean): --  RR: 14 (14 Mar 2018 14:00) (10 - 24)  SpO2: 100% (14 Mar 2018 14:00) (100% - 100%)          I&O's Detail    13 Mar 2018 07:01  -  14 Mar 2018 07:00  --------------------------------------------------------  IN:    dexmedetomidine Infusion: 327 mL    multiple electrolytes Injection Type 1: 800 mL    Solution: 250 mL    Solution: 50 mL    Solution: 200 mL  Total IN: 1627 mL    OUT:    Indwelling Catheter - Urethral: 2445 mL  Total OUT: 2445 mL    Total NET: -818 mL      14 Mar 2018 07:01  -  14 Mar 2018 16:49  --------------------------------------------------------  IN:    dexmedetomidine Infusion: 90 mL    multiple electrolytes Injection Type 1: 350 mL  Total IN: 440 mL    OUT:    Indwelling Catheter - Urethral: 875 mL  Total OUT: 875 mL    Total NET: -435 mL            LABS:                        13.9   11.6  )-----------( 187      ( 14 Mar 2018 09:15 )             41.9     03-14    143  |  103  |  59.0<H>  ----------------------------<  149<H>  3.8   |  26.0  |  2.33<H>    Ca    9.4      14 Mar 2018 09:15  Phos  2.7     03-14  Mg     2.2     03-14            CAPILLARY BLOOD GLUCOSE  136 (14 Mar 2018 08:00)      POCT Blood Glucose.: 92 mg/dL (14 Mar 2018 15:42)        CULTURES:  Culture Results:   No growth at 3 days. (03-10 @ 10:29)  Culture Results:   No growth at 48 hours (03-10 @ 10:05)  Culture Results:   No growth at 48 hours (03-10 @ 10:04)      Physical Examination:    General: No acute distress.      HEENT: Pupils equal, reactive to light.  Symmetric.    PULM: Coarse to auscultation bilaterally, no significant sputum production    CVS: Regular rate and rhythm, no murmurs, rubs, or gallops    ABD: Soft, nondistended, nontender, normoactive bowel sounds, no masses    EXT: No edema, nontender    SKIN: Warm and well perfused, shiny taut skin on bilateral lower extremities    NEURO: Arouses to to tactile, mumbles incoherently at times able to discern short phrases    RADIOLOGY:   < from: Xray Chest 1 View AP/PA. (03.13.18 @ 22:46) >    INTERPRETATION:  HISTORY: Nasogastric tube placement. Encephalitis.  TECHNIQUE: Portable frontal view of the chest, 1 view.  COMPARISON: 3/13/2018.  FINDINGS:   A cardiac device overlies and obscures the left hemithorax with leads in   place. Multiple wires overlie the patient  HEART:  Enlarged  LUNGS: free of consolidation or effusion.    OSSEOUS STRUCTURES:: degenerative changes    IMPRESSION:     Cardiomegaly.    No infiltrates.    No nasogastric tube is seen                GINO CORNEJO M.D., ATTENDING RADIOLOGIST  This document has been electronically signed. Mar 14 2018  8:33AM        < end of copied text >  < from: CT Head No Cont (03.12.18 @ 11:39) >    TECHNIQUE: Contiguous non contrast axial images of brain from skull base   to vertex [place] coronal reformations.   This scan was performed   using automatic exposure control (radiation dose reduction software) to   obtain a diagnostic image quality scan with patient dose as low as   reasonably achievable.      COMPARISON: 3/9/2018     FINDINGS:       There is prominence of the ventricles and cortical sulci. Midline   structures are intact. There are patchy hypodensities in periventricular   distribution consistent with chronic small vessel ischemic changes. There   is no CT evidence of acute territorial infarction.  There is no   hemorrhage, contusion or extraaxial collection. There is no acute   hydrocephalus. The visualized posterior fossa structures are intact.   There are scattered intracranial arterial calcification.  The visualized   paranasal sinuses are clear.    IMPRESSION:       No parenchymal contusion, hemorrhage or extra-axial collection.    No CT evidence of an acute territorial infarction.                CLARISSA SHUKLA M.D., ATTENDING RADIOLOGIST  This document has been electronically signed. Mar 12 2018 11:52AM        < end of copied text >    CRITICAL CARE TIME SPENT: 60

## 2018-03-14 NOTE — PROGRESS NOTE ADULT - PROBLEM SELECTOR PLAN 5
Continue ISS q 4 hours and long acting coverage. If remains controlled can consider transitioning to q 6 hour ISS/accu-checks.

## 2018-03-14 NOTE — PROGRESS NOTE ADULT - PROBLEM SELECTOR PLAN 1
CSF studies negative. VZV CSF PCR sent in setting of ongoing shingles- neg. HSV CSF PCR neg as well. Acyclovir continues. F/u RPR and VDRL. Encephalopathy persists

## 2018-03-14 NOTE — PROGRESS NOTE ADULT - PROBLEM SELECTOR PLAN 2
Etiology unclear, likely post-ictal component. EEG with suspicion for encephalitis. Continue Precedex for light sedation given recent episodes of acute agitation, could not successfully place NGT for atypical agents like seroquel  Aspiration precautions.

## 2018-03-14 NOTE — PROGRESS NOTE ADULT - PROBLEM SELECTOR PLAN 2
Etiology unclear, likely post-ictal component. EEG with suspicion for encephalitis. Continue Precedex for light sedation given recent episodes of acute agitation. Aspiration precautions.

## 2018-03-14 NOTE — PROGRESS NOTE ADULT - ATTENDING COMMENTS
Updated pt's wife [place] at bedside with son Jeison (via speaker phone) on pts current condition and work up in progress.

## 2018-03-14 NOTE — PROGRESS NOTE ADULT - PROBLEM SELECTOR PLAN 3
Related to AMS and inability to protect airway. Extubated successfully on 3/11, remains severely encephalopathic, close respiratory surveillance

## 2018-03-14 NOTE — PROGRESS NOTE ADULT - SUBJECTIVE AND OBJECTIVE BOX
Patient is a 73y old  Male who presents with a chief complaint of Unresponsive (10 Mar 2018 05:56)      BRIEF HOSPITAL COURSE: 74 y/o M with a h/o DM, CHF, HTN, CKD, shingles over the past 3 months, who presented BIBA after being found unresponsive by wife at home. Upon arrival to ED, /110, patient was unresponsive to verbal stimuli, and was intubated by Anesthesia for concerns of inability to protect airway and hypoxia. Patient was noted to have L cheek focal twitching, withdrew to noxious stimuli x 4 extremities, however had a deficit on LUE / LLE. Initial CT head negative for acute hemorrhage or infarct, no visible MCA sign. Deemed not a candidate for tPA. Later developed tonic-clonic seizure activity. EEG performed without seizure activity noted. Concern for meningoencephalitis, however, LP unremarkable. Extubated on 3/11.    Events last 24 hours: Patient remains severely encephalopathic, not answering questions or following commands, unresponsive at times, still some difficulty managing secretions, but somewhat improved. Hemodynamically stable.     Past medical history:  CHF (congestive heart failure)  HTN (hypertension)  Diabetes  No significant past surgical history      Review of Systems: Unable to obtain secondary to mental status        Medications:  acyclovir IVPB      acyclovir IVPB 880 milliGRAM(s) IV Intermittent every 12 hours  furosemide   Injectable 40 milliGRAM(s) IV Push daily  metoprolol    tartrate Injectable 2.5 milliGRAM(s) IV Push every 6 hours  dexmedetomidine Infusion 0.4 MICROgram(s)/kG/Hr IV Continuous <Continuous>  fentaNYL    Injectable 50 MICROGram(s) IV Push every 1 hour PRN  levETIRAcetam  IVPB 750 milliGRAM(s) IV Intermittent every 12 hours  QUEtiapine 50 milliGRAM(s) Oral two times a day  heparin  Injectable 5000 Unit(s) SubCutaneous every 12 hours  dextrose 50% Injectable 12.5 Gram(s) IV Push once  dextrose 50% Injectable 25 Gram(s) IV Push once  dextrose 50% Injectable 25 Gram(s) IV Push once  dextrose Gel 1 Dose(s) Oral once PRN  glucagon  Injectable 1 milliGRAM(s) IntraMuscular once PRN  insulin glargine Injectable (LANTUS) 40 Unit(s) SubCutaneous two times a day  insulin lispro (HumaLOG) corrective regimen sliding scale   SubCutaneous every 4 hours  dextrose 5%. 1000 milliLiter(s) IV Continuous <Continuous>  multiple electrolytes Injection Type 1 1000 milliLiter(s) IV Continuous <Continuous>        ICU Vital Signs Last 24 Hrs  T(C): 37.4 (14 Mar 2018 00:06), Max: 37.6 (13 Mar 2018 08:00)  T(F): 99.3 (14 Mar 2018 00:06), Max: 99.7 (13 Mar 2018 08:00)  HR: 74 (14 Mar 2018 03:00) (73 - 103)  BP: 114/68 (14 Mar 2018 03:00) (114/68 - 158/87)  BP(mean): 88 (14 Mar 2018 03:00) (88 - 116)  ABP: --  ABP(mean): --  RR: 17 (14 Mar 2018 03:00) (15 - 30)  SpO2: 100% (14 Mar 2018 03:00) (93% - 100%)          I&O's Detail    12 Mar 2018 07:01  -  13 Mar 2018 07:00  --------------------------------------------------------  IN:    dexmedetomidine Infusion: 380.1 mL    insulin Infusion: 21 mL    multiple electrolytes Injection Type 1: 1200 mL  Total IN: 1601.1 mL    OUT:    Indwelling Catheter - Urethral: 310 mL    Voided: 1450 mL  Total OUT: 1760 mL    Total NET: -158.9 mL      13 Mar 2018 07:01  -  14 Mar 2018 03:18  --------------------------------------------------------  IN:    dexmedetomidine Infusion: 255 mL    multiple electrolytes Injection Type 1: 600 mL    Solution: 50 mL    Solution: 100 mL  Total IN: 1005 mL    OUT:    Indwelling Catheter - Urethral: 1970 mL  Total OUT: 1970 mL    Total NET: -965 mL            LABS:                        14.8   13.5  )-----------( 172      ( 13 Mar 2018 07:36 )             44.4     03-13    143  |  100  |  55.0<H>  ----------------------------<  185<H>  4.2   |  23.0  |  2.47<H>    Ca    9.0      13 Mar 2018 09:18  Phos  4.1     03-13  Mg     1.9     03-13    TPro  6.4<L>  /  Alb  2.8<L>  /  TBili  1.4  /  DBili  x   /  AST  26  /  ALT  15  /  AlkPhos  82  03-12          CAPILLARY BLOOD GLUCOSE  147 (12 Mar 2018 07:00)      POCT Blood Glucose.: 191 mg/dL (13 Mar 2018 23:41)        CULTURES:  Culture Results:   No growth at 3 days. (03-10-18 @ 10:29)  Culture Results:   No growth at 48 hours (03-10-18 @ 10:05)  Culture Results:   No growth at 48 hours (03-10-18 @ 10:04)      Physical Examination:    General: No acute distress. encephalopathic, lethargic, obtunded at times    HEENT: Pupils equal, reactive to light.  Symmetric.    PULM: Clear to auscultation bilaterally, copious secretions in oral cavity and oropharynx    CVS: Regular rate and rhythm, no murmurs, rubs, or gallops    ABD: Soft, nondistended, nontender, normoactive bowel sounds, no masses    EXT: No edema, nontender    SKIN: Warm and well perfused, no rashes noted.    NEURO: A&Ox0, not answering questions or following commands, moves all extremities spontaneously, difficulty managing secretions      RADIOLOGY:     < from: CT Head No Cont (03.12.18 @ 11:39) >  FINDINGS:       There is prominence of the ventricles and cortical sulci. Midline   structures are intact. There are patchy hypodensities in periventricular   distribution consistent with chronic small vessel ischemic changes. There   is no CT evidence of acute territorial infarction.  There is no   hemorrhage, contusion or extraaxial collection. There is no acute   hydrocephalus. The visualized posterior fossa structures are intact.   There are scattered intracranial arterial calcification.  The visualized   paranasal sinuses are clear.    IMPRESSION:       No parenchymal contusion, hemorrhage or extra-axial collection.    No CT evidence of an acute territorial infarction.      < from: CT Angio Head w/ IV Cont (03.10.18 @ 00:24) >  FINDINGS:    CTA neck:  There is a bovine configuration to the great vessels as they arise from   the aortic arch with the left common carotid artery arises from the   brachiocephalic artery and not the arch itself. The arch and origins of   the great vessels are patent.    Both common carotid arteries take a normal course and caliber throughout   the cervical region. There is slightly higher bifurcation into the   internal and external carotid arteries on the left. There is mild   calcific atherosclerotic plaque at the origin of the right internal   carotid artery contributing to mild luminal stenosis. Otherwise, cervical   internal carotid artery segments bilaterally and the external carotid   arteries branches visualized appear unremarkable.  No evidence of focal   occlusion, hemodynamically significant stenosis or dissection of the   anterior carotid circulation within the neck.    Origins of both vertebral arteries are identified and widely patent. The   vertebral arteries are codominant and overall diminutive in caliber. The   take anormal course throughout the cervical region without focal   occlusion, hemodynamically significant stenosis or dissection is seen in   the posterior vertebral circulation within the neck.    There is no evidence of vascular malformation in the neck.There is no   abnormal AV shunting.    The patient is intubated at time of examination. There are secretions in   the trachea below the tip of the ET tube. There is no significant   adenopathy in the neck. There is paraseptal emphysema in the lung apices.    There are no acute osseous abnormalities.      CTA brain:  The cervical, petrous, lacerum, of both internal carotid arteries are   widely patent and of normal course and caliber throughout the skull base.   There are extensive calcifications involving both cavernous segments with   severe luminal stenosis seen on the right. There is severe focal   narrowing of the clinoid segment of the left internal carotid artery. The   middle and anterior cerebral arteries are widely patent and demonstrate   symmetric arborization without focal occlusion, hemodynamically   significant stenosis or aneurysm. Anterior communicating artery is   unremarkable.    The the intradural vertebral arteries remain codominant and overall   diminutive in caliber.Both posterior inferior cerebellar arteries are   identified and unremarkable. The basilar artery and its tributaries,   including the posterior cerebral arteries are widely patent and normal in   caliber. Both posterior communicating arteries are identified and   unremarkable. No focal occlusion, hemodynamically significant stenosis or   aneurysm is seen in the posterior vertebral basilar circulation.    The major dural venous sinuses and peripheral venous tributaries enhance   appropriately forphase of contrast administration. There is no abnormal   intracranial enhancement. There is no evidence of a vascular malformation.      IMPRESSION:  CTA head and neck:   No major vessel occlusion, hemodynamically significant stenosis,   dissection or aneurysm.  Congenital variation in anatomy as above.      < from: Xray Chest 1 View AP/PA. (03.09.18 @ 22:38) >  FINDINGS:    The lungs  are clear.  No pleural abnormality is seen.         The  heart is enlarged in transverse diameter. No hilar mass. Trachea   midline.  ET tube tip above tracheal bifurcation.  NG tube tip beyond GE junction.    . Cardiac device wire leads are within right atrium and right ventricle.   . Visualized osseous structures are intact.    IMPRESSION:   No evidence of active chest disease.        Catheters in place. Cardiomegaly.      CRITICAL CARE TIME SPENT: 38 mins  Evaluating/treating patient, reviewing data/labs/imaging, discussing case with multidisciplinary team, discussing plan/goals of care with patient/family. Non-inclusive of procedure time.

## 2018-03-15 LAB
ANION GAP SERPL CALC-SCNC: 15 MMOL/L — SIGNIFICANT CHANGE UP (ref 5–17)
BUN SERPL-MCNC: 57 MG/DL — HIGH (ref 8–20)
CALCIUM SERPL-MCNC: 9.4 MG/DL — SIGNIFICANT CHANGE UP (ref 8.6–10.2)
CHLORIDE SERPL-SCNC: 104 MMOL/L — SIGNIFICANT CHANGE UP (ref 98–107)
CO2 SERPL-SCNC: 29 MMOL/L — SIGNIFICANT CHANGE UP (ref 22–29)
CREAT SERPL-MCNC: 2.28 MG/DL — HIGH (ref 0.5–1.3)
CULTURE RESULTS: SIGNIFICANT CHANGE UP
CULTURE RESULTS: SIGNIFICANT CHANGE UP
GLUCOSE BLDC GLUCOMTR-MCNC: 107 MG/DL — HIGH (ref 70–99)
GLUCOSE BLDC GLUCOMTR-MCNC: 133 MG/DL — HIGH (ref 70–99)
GLUCOSE BLDC GLUCOMTR-MCNC: 204 MG/DL — HIGH (ref 70–99)
GLUCOSE BLDC GLUCOMTR-MCNC: 54 MG/DL — LOW (ref 70–99)
GLUCOSE BLDC GLUCOMTR-MCNC: 56 MG/DL — LOW (ref 70–99)
GLUCOSE BLDC GLUCOMTR-MCNC: 70 MG/DL — SIGNIFICANT CHANGE UP (ref 70–99)
GLUCOSE BLDC GLUCOMTR-MCNC: 77 MG/DL — SIGNIFICANT CHANGE UP (ref 70–99)
GLUCOSE SERPL-MCNC: 57 MG/DL — LOW (ref 70–115)
HCT VFR BLD CALC: 45.6 % — SIGNIFICANT CHANGE UP (ref 42–52)
HGB BLD-MCNC: 15.4 G/DL — SIGNIFICANT CHANGE UP (ref 14–18)
MAGNESIUM SERPL-MCNC: 2.2 MG/DL — SIGNIFICANT CHANGE UP (ref 1.6–2.6)
MCHC RBC-ENTMCNC: 29.3 PG — SIGNIFICANT CHANGE UP (ref 27–31)
MCHC RBC-ENTMCNC: 33.8 G/DL — SIGNIFICANT CHANGE UP (ref 32–36)
MCV RBC AUTO: 86.7 FL — SIGNIFICANT CHANGE UP (ref 80–94)
PHOSPHATE SERPL-MCNC: 3.6 MG/DL — SIGNIFICANT CHANGE UP (ref 2.4–4.7)
PLATELET # BLD AUTO: 197 K/UL — SIGNIFICANT CHANGE UP (ref 150–400)
POTASSIUM SERPL-MCNC: 3.3 MMOL/L — LOW (ref 3.5–5.3)
POTASSIUM SERPL-SCNC: 3.3 MMOL/L — LOW (ref 3.5–5.3)
RBC # BLD: 5.26 M/UL — SIGNIFICANT CHANGE UP (ref 4.6–6.2)
RBC # FLD: 14.4 % — SIGNIFICANT CHANGE UP (ref 11–15.6)
SODIUM SERPL-SCNC: 148 MMOL/L — HIGH (ref 135–145)
SPECIMEN SOURCE: SIGNIFICANT CHANGE UP
SPECIMEN SOURCE: SIGNIFICANT CHANGE UP
T PALLIDUM AB TITR SER: ABNORMAL
WBC # BLD: 9.2 K/UL — SIGNIFICANT CHANGE UP (ref 4.8–10.8)
WBC # FLD AUTO: 9.2 K/UL — SIGNIFICANT CHANGE UP (ref 4.8–10.8)

## 2018-03-15 PROCEDURE — 99233 SBSQ HOSP IP/OBS HIGH 50: CPT

## 2018-03-15 PROCEDURE — 99231 SBSQ HOSP IP/OBS SF/LOW 25: CPT

## 2018-03-15 RX ORDER — DEXTROSE 50 % IN WATER 50 %
12.5 SYRINGE (ML) INTRAVENOUS ONCE
Qty: 0 | Refills: 0 | Status: COMPLETED | OUTPATIENT
Start: 2018-03-15 | End: 2018-03-15

## 2018-03-15 RX ORDER — INSULIN LISPRO 100/ML
VIAL (ML) SUBCUTANEOUS EVERY 6 HOURS
Qty: 0 | Refills: 0 | Status: DISCONTINUED | OUTPATIENT
Start: 2018-03-15 | End: 2018-03-15

## 2018-03-15 RX ORDER — INSULIN LISPRO 100/ML
VIAL (ML) SUBCUTANEOUS AT BEDTIME
Qty: 0 | Refills: 0 | Status: DISCONTINUED | OUTPATIENT
Start: 2018-03-15 | End: 2018-03-15

## 2018-03-15 RX ORDER — INSULIN LISPRO 100/ML
VIAL (ML) SUBCUTANEOUS
Qty: 0 | Refills: 0 | Status: DISCONTINUED | OUTPATIENT
Start: 2018-03-15 | End: 2018-03-18

## 2018-03-15 RX ADMIN — INSULIN GLARGINE 40 UNIT(S): 100 INJECTION, SOLUTION SUBCUTANEOUS at 08:07

## 2018-03-15 RX ADMIN — HEPARIN SODIUM 5000 UNIT(S): 5000 INJECTION INTRAVENOUS; SUBCUTANEOUS at 17:45

## 2018-03-15 RX ADMIN — Medication 267.6 MILLIGRAM(S): at 17:45

## 2018-03-15 RX ADMIN — Medication 2.5 MILLIGRAM(S): at 23:10

## 2018-03-15 RX ADMIN — Medication 12.5 GRAM(S): at 05:52

## 2018-03-15 RX ADMIN — Medication 4: at 21:56

## 2018-03-15 RX ADMIN — HEPARIN SODIUM 5000 UNIT(S): 5000 INJECTION INTRAVENOUS; SUBCUTANEOUS at 05:25

## 2018-03-15 RX ADMIN — Medication 267.6 MILLIGRAM(S): at 05:24

## 2018-03-15 RX ADMIN — Medication 2.5 MILLIGRAM(S): at 12:01

## 2018-03-15 RX ADMIN — LEVETIRACETAM 400 MILLIGRAM(S): 250 TABLET, FILM COATED ORAL at 04:32

## 2018-03-15 RX ADMIN — DEXMEDETOMIDINE HYDROCHLORIDE IN 0.9% SODIUM CHLORIDE 12.24 MICROGRAM(S)/KG/HR: 4 INJECTION INTRAVENOUS at 01:18

## 2018-03-15 RX ADMIN — INSULIN GLARGINE 40 UNIT(S): 100 INJECTION, SOLUTION SUBCUTANEOUS at 21:47

## 2018-03-15 RX ADMIN — Medication 2.5 MILLIGRAM(S): at 00:23

## 2018-03-15 RX ADMIN — QUETIAPINE FUMARATE 50 MILLIGRAM(S): 200 TABLET, FILM COATED ORAL at 18:25

## 2018-03-15 RX ADMIN — LEVETIRACETAM 400 MILLIGRAM(S): 250 TABLET, FILM COATED ORAL at 16:15

## 2018-03-15 RX ADMIN — Medication 40 MILLIGRAM(S): at 05:24

## 2018-03-15 RX ADMIN — Medication 2.5 MILLIGRAM(S): at 17:45

## 2018-03-15 RX ADMIN — DEXMEDETOMIDINE HYDROCHLORIDE IN 0.9% SODIUM CHLORIDE 12.24 MICROGRAM(S)/KG/HR: 4 INJECTION INTRAVENOUS at 05:38

## 2018-03-15 RX ADMIN — Medication 2.5 MILLIGRAM(S): at 05:24

## 2018-03-15 NOTE — SWALLOW BEDSIDE ASSESSMENT ADULT - SWALLOW EVAL: DIAGNOSIS
Oral dysphagia impacted by cognition, with +inconsistent anterior loss, reduced oral transit & reduced mastication. Pharyngeal stage clinically unremarkable with NO overt s/s of aspiration

## 2018-03-15 NOTE — SWALLOW BEDSIDE ASSESSMENT ADULT - ADDITIONAL RECOMMENDATIONS
Will follow to re-assess swallow bedside, as schedule permits   Speech-language evaluation , pending MD order

## 2018-03-15 NOTE — SWALLOW BEDSIDE ASSESSMENT ADULT - ASR SWALLOW ASPIRATION MONITOR
fever/pneumonia/throat clearing/upper respiratory infection/oral hygiene/cough/change of breathing pattern/position upright (90Y)/gurgly voice

## 2018-03-15 NOTE — SWALLOW BEDSIDE ASSESSMENT ADULT - ORAL PREPARATORY PHASE
Reduced oral grading Reduced oral grading/inconsistent anterior loss ? due to cognition/Anterior loss of bolus Reduced oral grading/Decreased mastication ability

## 2018-03-15 NOTE — PROGRESS NOTE ADULT - PROBLEM SELECTOR PLAN 6
BUN/Cr around baseline- will trend. Monitor lytes. Monitor urine output. Avoid nephrotoxic agents. pt with urinary retention will keep christopher at present pt unable to take any meds like flomax to help with BPH BUN/Cr around baseline- will trend. Resume Flomax with trial of void in the next few days

## 2018-03-15 NOTE — SWALLOW BEDSIDE ASSESSMENT ADULT - SLP GENERAL OBSERVATIONS
Pt received & seen seated upright in bed, +awake/alert, +O2 sats: 100% on room air, +reduced command following, +word finding difficulties with +perseverations

## 2018-03-15 NOTE — PROGRESS NOTE ADULT - PROBLEM SELECTOR PLAN 5
Continue ISS q 4 hours and long acting coverage. If remains controlled can consider transitioning to q 6 hour ISS/accu-checks. had episodes of hypoglycemia last night as pt wasn't taking PO and getting lantus. Now passed swallow eval resumed diet with aspiration precautions

## 2018-03-15 NOTE — PROGRESS NOTE ADULT - PROBLEM SELECTOR PLAN 1
CSF studies negative. VZV CSF PCR sent in setting of ongoing shingles- neg. HSV CSF PCR neg as well. Acyclovir continues. F/u RPR and VDRL. Encephalopathy persists CSF studies negative. VZV CSF PCR sent in setting of ongoing shingles- neg. HSV CSF PCR neg as well. Acyclovir continues. Inconclusive RPR and VDRL. Encephalopathy improving ever so slowly

## 2018-03-15 NOTE — PROGRESS NOTE ADULT - PROBLEM SELECTOR PLAN 2
Etiology unclear, timeframe now too long to solely attribute to post-ictal component. EEG with suspicion for encephalitis. Continue Precedex for light sedation given recent episodes of acute agitation- weaning as tolerated. Continue Seroquel. Aspiration precautions. Ammonia level was normal. Etiology unclear, timeframe now too long to solely attribute to post-ictal component. EEG with suspicion for encephalitis. Continue Precedex for light sedation given recent episodes of acute agitation- weaning as tolerated. Continue Seroquel. Aspiration precautions. Ammonia level was normal. Will need NGT for tube feeds if he remains unable to swallow.

## 2018-03-15 NOTE — PROGRESS NOTE ADULT - PROBLEM SELECTOR PLAN 3
Related to AMS and inability to protect airway. Extubated successfully on 3/11, however, remains severely encephalopathic with difficulty managing secretions and periods of desaturation. Remains tolerating NC, titrating to maintain SaO2 > 92%. At this point in time, patient is not a candidate for BiPAP secondary to mental status and poor management of copious secretions- if loses ability to protect airway adequately and decompensates from a respiratory standpoint will have to re-intubate- critical airway so will involve anesthesia.

## 2018-03-15 NOTE — PROGRESS NOTE ADULT - SUBJECTIVE AND OBJECTIVE BOX
[facility]                                     [facility] [place]                                 [facility]                                  [address]                                        [place], [place], [address]                                             [phone]      Vital signs:  T(C): 37.1 (03-15-18 @ 16:00), Max: 37.1 (03-15-18 @ 16:00)  HR: 109 (03-15-18 @ 16:00) (60 - 110)  BP: 173/949 (03-15-18 @ 16:00) (119/68 - 193/92)  RR: 22 (03-15-18 @ 16:00) (9 - 22)  SpO2: 92% (03-15-18 @ 16:00) (91% - 100%)  Wt(kg): --    Exam:    No new complaints.  still confused  not really speaking much  Pupils react.  Face symmetric  moves 4 ext to stimuli

## 2018-03-15 NOTE — PROGRESS NOTE ADULT - SUBJECTIVE AND OBJECTIVE BOX
Patient is a 73y old  Male who presents with a chief complaint of Unresponsive (10 Mar 2018 05:56)      BRIEF HOSPITAL COURSE: ***    Events last 24 hours: ***    PAST MEDICAL & SURGICAL HISTORY:  CHF (congestive heart failure)  HTN (hypertension)  Diabetes  No significant past surgical history      Review of Systems:  CONSTITUTIONAL: No fever, chills, or fatigue  EYES: No eye pain, visual disturbances, or discharge  ENMT:  No difficulty hearing, tinnitus, vertigo; No sinus or throat pain  NECK: No pain or stiffness  RESPIRATORY: No cough, wheezing, chills or hemoptysis; No shortness of breath  CARDIOVASCULAR: No chest pain, palpitations, dizziness, or leg swelling  GASTROINTESTINAL: No abdominal or epigastric pain. No nausea, vomiting, or hematemesis; No diarrhea or constipation. No melena or hematochezia.  GENITOURINARY: No dysuria, frequency, hematuria, or incontinence  NEUROLOGICAL: No headaches, memory loss, loss of strength, numbness, or tremors  SKIN: No itching, burning, rashes, or lesions   MUSCULOSKELETAL: No joint pain or swelling; No muscle, back, or extremity pain  PSYCHIATRIC: No depression, anxiety, mood swings, or difficulty sleeping      Medications:  acyclovir IVPB      acyclovir IVPB 880 milliGRAM(s) IV Intermittent every 12 hours    furosemide   Injectable 40 milliGRAM(s) IV Push daily  metoprolol    tartrate Injectable 2.5 milliGRAM(s) IV Push every 6 hours      dexmedetomidine Infusion 0.4 MICROgram(s)/kG/Hr IV Continuous <Continuous>  fentaNYL    Injectable 50 MICROGram(s) IV Push every 1 hour PRN  levETIRAcetam  IVPB 750 milliGRAM(s) IV Intermittent every 12 hours  QUEtiapine 50 milliGRAM(s) Oral two times a day      heparin  Injectable 5000 Unit(s) SubCutaneous every 12 hours        dextrose 50% Injectable 12.5 Gram(s) IV Push once  dextrose 50% Injectable 25 Gram(s) IV Push once  dextrose 50% Injectable 25 Gram(s) IV Push once  dextrose Gel 1 Dose(s) Oral once PRN  glucagon  Injectable 1 milliGRAM(s) IntraMuscular once PRN  insulin glargine Injectable (LANTUS) 40 Unit(s) SubCutaneous two times a day  insulin lispro (HumaLOG) corrective regimen sliding scale   SubCutaneous every 4 hours    dextrose 5%. 1000 milliLiter(s) IV Continuous <Continuous>  multiple electrolytes Injection Type 1 1000 milliLiter(s) IV Continuous <Continuous>                ICU Vital Signs Last 24 Hrs  T(C): 36.4 (14 Mar 2018 19:15), Max: 37.3 (14 Mar 2018 04:30)  T(F): 97.5 (14 Mar 2018 19:15), Max: 99.1 (14 Mar 2018 04:30)  HR: 60 (15 Mar 2018 01:00) (60 - 74)  BP: 126/77 (15 Mar 2018 01:00) (108/65 - 142/85)  BP(mean): 95 (15 Mar 2018 01:00) (81 - 107)  ABP: --  ABP(mean): --  RR: 15 (15 Mar 2018 01:00) (9 - 23)  SpO2: 100% (15 Mar 2018 01:00) (100% - 100%)          I&O's Detail    13 Mar 2018 07:01  -  14 Mar 2018 07:00  --------------------------------------------------------  IN:    dexmedetomidine Infusion: 327 mL    multiple electrolytes Injection Type 1: 800 mL    Solution: 250 mL    Solution: 50 mL    Solution: 200 mL  Total IN: 1627 mL    OUT:    Indwelling Catheter - Urethral: 2445 mL  Total OUT: 2445 mL    Total NET: -818 mL      14 Mar 2018 07:01  -  15 Mar 2018 02:43  --------------------------------------------------------  IN:    dexmedetomidine Infusion: 246 mL    multiple electrolytes Injection Type 1: 1000 mL  Total IN: 1246 mL    OUT:    Indwelling Catheter - Urethral: 2395 mL  Total OUT: 2395 mL    Total NET: -1149 mL            LABS:                        13.9   11.6  )-----------( 187      ( 14 Mar 2018 09:15 )             41.9     03-14    143  |  103  |  59.0<H>  ----------------------------<  149<H>  3.8   |  26.0  |  2.33<H>    Ca    9.4      14 Mar 2018 09:15  Phos  2.7     03-14  Mg     2.2     03-14            CAPILLARY BLOOD GLUCOSE  136 (14 Mar 2018 08:00)      POCT Blood Glucose.: 77 mg/dL (15 Mar 2018 00:21)        CULTURES:  Culture Results:   No growth at 3 days. (03-10-18 @ 10:29)  Culture Results:   No growth at 48 hours (03-10-18 @ 10:05)  Culture Results:   No growth at 48 hours (03-10-18 @ 10:04)      Physical Examination:    General: No acute distress.  Alert, oriented, interactive, nonfocal    HEENT: Pupils equal, reactive to light.  Symmetric.    PULM: Clear to auscultation bilaterally, no significant sputum production    CVS: Regular rate and rhythm, no murmurs, rubs, or gallops    ABD: Soft, nondistended, nontender, normoactive bowel sounds, no masses    EXT: No edema, nontender    SKIN: Warm and well perfused, no rashes noted.    NEURO: A&Ox3, strength 5/5 all extremities, cranial nerves grossly intact, no focal deficits    RADIOLOGY: ***    CRITICAL CARE TIME SPENT: ***  Evaluating/treating patient, reviewing data/labs/imaging, discussing case with multidisciplinary team, discussing plan/goals of care with patient/family. Non-inclusive of procedure time. Patient is a 73y old  Male who presents with a chief complaint of Unresponsive (10 Mar 2018 05:56)      BRIEF HOSPITAL COURSE: 74 y/o M with a h/o DM, CHF, HTN, CKD, shingles over the past 3 months, who presented BIBA after being found unresponsive by wife at home. Upon arrival to ED, /110, patient was unresponsive to verbal stimuli, and was intubated by Anesthesia for concerns of inability to protect airway and hypoxia. Patient was noted to have L cheek focal twitching, withdrew to noxious stimuli x 4 extremities, however had a deficit on LUE / LLE. Initial CT head negative for acute hemorrhage or infarct, no visible MCA sign. Deemed not a candidate for tPA. Later developed tonic-clonic seizure activity. EEG performed without seizure activity noted. Concern for meningoencephalitis, however, LP unremarkable. Extubated on 3/11.    Events last 24 hours: Patient remains severely encephalopathic, but has improved further, now grunting incoherent responses to questions. Still some difficulty managing secretions. Hemodynamically stable.     PAST MEDICAL & SURGICAL HISTORY:  CHF (congestive heart failure)  HTN (hypertension)  Diabetes  No significant past surgical history      Review of Systems: Unable to obtain secondary to mental status      Medications:  acyclovir IVPB      acyclovir IVPB 880 milliGRAM(s) IV Intermittent every 12 hours  furosemide   Injectable 40 milliGRAM(s) IV Push daily  metoprolol    tartrate Injectable 2.5 milliGRAM(s) IV Push every 6 hours  dexmedetomidine Infusion 0.4 MICROgram(s)/kG/Hr IV Continuous <Continuous>  fentaNYL    Injectable 50 MICROGram(s) IV Push every 1 hour PRN  levETIRAcetam  IVPB 750 milliGRAM(s) IV Intermittent every 12 hours  QUEtiapine 50 milliGRAM(s) Oral two times a day  heparin  Injectable 5000 Unit(s) SubCutaneous every 12 hours  dextrose 50% Injectable 12.5 Gram(s) IV Push once  dextrose 50% Injectable 25 Gram(s) IV Push once  dextrose 50% Injectable 25 Gram(s) IV Push once  dextrose Gel 1 Dose(s) Oral once PRN  glucagon  Injectable 1 milliGRAM(s) IntraMuscular once PRN  insulin glargine Injectable (LANTUS) 40 Unit(s) SubCutaneous two times a day  insulin lispro (HumaLOG) corrective regimen sliding scale   SubCutaneous every 4 hours  dextrose 5%. 1000 milliLiter(s) IV Continuous <Continuous>  multiple electrolytes Injection Type 1 1000 milliLiter(s) IV Continuous <Continuous>        ICU Vital Signs Last 24 Hrs  T(C): 36.4 (14 Mar 2018 19:15), Max: 37.3 (14 Mar 2018 04:30)  T(F): 97.5 (14 Mar 2018 19:15), Max: 99.1 (14 Mar 2018 04:30)  HR: 60 (15 Mar 2018 01:00) (60 - 74)  BP: 126/77 (15 Mar 2018 01:00) (108/65 - 142/85)  BP(mean): 95 (15 Mar 2018 01:00) (81 - 107)  ABP: --  ABP(mean): --  RR: 15 (15 Mar 2018 01:00) (9 - 23)  SpO2: 100% (15 Mar 2018 01:00) (100% - 100%)          I&O's Detail    13 Mar 2018 07:01  -  14 Mar 2018 07:00  --------------------------------------------------------  IN:    dexmedetomidine Infusion: 327 mL    multiple electrolytes Injection Type 1: 800 mL    Solution: 250 mL    Solution: 50 mL    Solution: 200 mL  Total IN: 1627 mL    OUT:    Indwelling Catheter - Urethral: 2445 mL  Total OUT: 2445 mL    Total NET: -818 mL      14 Mar 2018 07:01  -  15 Mar 2018 02:43  --------------------------------------------------------  IN:    dexmedetomidine Infusion: 246 mL    multiple electrolytes Injection Type 1: 1000 mL  Total IN: 1246 mL    OUT:    Indwelling Catheter - Urethral: 2395 mL  Total OUT: 2395 mL    Total NET: -1149 mL            LABS:                        13.9   11.6  )-----------( 187      ( 14 Mar 2018 09:15 )             41.9     03-14    143  |  103  |  59.0<H>  ----------------------------<  149<H>  3.8   |  26.0  |  2.33<H>    Ca    9.4      14 Mar 2018 09:15  Phos  2.7     03-14  Mg     2.2     03-14            CAPILLARY BLOOD GLUCOSE  136 (14 Mar 2018 08:00)      POCT Blood Glucose.: 77 mg/dL (15 Mar 2018 00:21)        CULTURES:  Culture Results:   No growth at 3 days. (03-10-18 @ 10:29)  Culture Results:   No growth at 48 hours (03-10-18 @ 10:05)  Culture Results:   No growth at 48 hours (03-10-18 @ 10:04)      Physical Examination:    General: No acute distress. encephalopathic, lethargic, obtunded at times    HEENT: Pupils equal, reactive to light.  Symmetric.    PULM: Clear to auscultation bilaterally, copious secretions in oral cavity and oropharynx    CVS: Regular rate and rhythm, no murmurs, rubs, or gallops    ABD: Soft, nondistended, nontender, normoactive bowel sounds, no masses    EXT: No edema, nontender    SKIN: Warm and well perfused, no rashes noted.    NEURO: A&Ox0, acknowledges question but brief grunting incoherent responses, moves all extremities spontaneously, difficulty managing secretions      RADIOLOGY:     < from: Xray Chest 1 View AP/PA. (03.13.18 @ 22:46) >  FINDINGS:   A cardiac device overlies and obscures the left hemithorax with leads in   place. Multiple wires overlie the patient  HEART:  Enlarged  LUNGS: free of consolidation or effusion.    OSSEOUS STRUCTURES:: degenerative changes    IMPRESSION:     Cardiomegaly.    No infiltrates.    No nasogastric tube is seen        CRITICAL CARE TIME SPENT: 36 mins  Evaluating/treating patient, reviewing data/labs/imaging, discussing case with multidisciplinary team, discussing plan/goals of care with patient/family. Non-inclusive of procedure time.

## 2018-03-15 NOTE — PROGRESS NOTE ADULT - PROBLEM SELECTOR PLAN 3
Related to AMS and inability to protect airway. Extubated successfully on 3/11, remains severely encephalopathic, close respiratory surveillance has been on minimal nasal oxygen

## 2018-03-15 NOTE — SWALLOW BEDSIDE ASSESSMENT ADULT - COMMENTS
74 y/o M with a h/o DM, CHF, HTN, shingles over the past 3 months, CKD, with acute hypoxemic respiratory failure, seizure, encephalopathy, r/o meningoencephalitis, hyperglycemia.

## 2018-03-15 NOTE — PROGRESS NOTE ADULT - ATTENDING COMMENTS
Updated pt's wife Murtaugh at bedside with son Jeison (via speaker phone) on pts current condition and work up in progress. Updated pt's wife at bedside

## 2018-03-15 NOTE — SWALLOW BEDSIDE ASSESSMENT ADULT - SWALLOW EVAL: RECOMMENDED FEEDING/EATING TECHNIQUES
allow for swallow between intakes/check mouth frequently for oral residue/pocketing/crush medication (when feasible)/position upright (90 degrees)/small sips/bites/maintain upright posture during/after eating for 30 mins/oral hygiene

## 2018-03-15 NOTE — PROGRESS NOTE ADULT - PROBLEM SELECTOR PLAN 2
Etiology unclear, likely post-ictal component. EEG with suspicion for encephalitis. Continue Precedex for light sedation given recent episodes of acute agitation, could not successfully place NGT for atypical agents like seroquel  Aspiration precautions. Etiology unclear, likely post-ictal component. EEG with suspicion for encephalitis.   Precedex has been off. Pt is calm and slowly coming about  Aspiration precautions.

## 2018-03-15 NOTE — PROGRESS NOTE ADULT - PROBLEM SELECTOR PLAN 1
Initial CSF studies unremarkable. VZV CSF PCR sent in setting of ongoing shingles- neg. HSV CSF PCR neg as well. Acyclovir continues. F/u RPR and VDRL. Encephalopathy persists, minimal improvement today.

## 2018-03-15 NOTE — PROGRESS NOTE ADULT - SUBJECTIVE AND OBJECTIVE BOX
Patient is a 73y old  Male who presents with a chief complaint of Unresponsive (10 Mar 2018 05:56)      BRIEF HOSPITAL COURSE: ***    Events last 24 hours: ***    PAST MEDICAL & SURGICAL HISTORY:  CHF (congestive heart failure)  HTN (hypertension)  Diabetes  No significant past surgical history      Review of Systems:  CONSTITUTIONAL: No fever, chills, or fatigue  EYES: No eye pain, visual disturbances, or discharge  ENMT:  No difficulty hearing, tinnitus, vertigo; No sinus or throat pain  NECK: No pain or stiffness  RESPIRATORY: No cough, wheezing, chills or hemoptysis; No shortness of breath  CARDIOVASCULAR: No chest pain, palpitations, dizziness, or leg swelling  GASTROINTESTINAL: No abdominal or epigastric pain. No nausea, vomiting, or hematemesis; No diarrhea or constipation. No melena or hematochezia.  GENITOURINARY: No dysuria, frequency, hematuria, or incontinence  NEUROLOGICAL: No headaches, memory loss, loss of strength, numbness, or tremors  SKIN: No itching, burning, rashes, or lesions   MUSCULOSKELETAL: No joint pain or swelling; No muscle, back, or extremity pain  PSYCHIATRIC: No depression, anxiety, mood swings, or difficulty sleeping      Medications:  acyclovir IVPB      acyclovir IVPB 880 milliGRAM(s) IV Intermittent every 12 hours    furosemide   Injectable 40 milliGRAM(s) IV Push daily  metoprolol    tartrate Injectable 2.5 milliGRAM(s) IV Push every 6 hours      levETIRAcetam  IVPB 750 milliGRAM(s) IV Intermittent every 12 hours  QUEtiapine 50 milliGRAM(s) Oral two times a day      heparin  Injectable 5000 Unit(s) SubCutaneous every 12 hours        dextrose 50% Injectable 12.5 Gram(s) IV Push once  dextrose 50% Injectable 25 Gram(s) IV Push once  dextrose 50% Injectable 25 Gram(s) IV Push once  dextrose Gel 1 Dose(s) Oral once PRN  glucagon  Injectable 1 milliGRAM(s) IntraMuscular once PRN  insulin glargine Injectable (LANTUS) 40 Unit(s) SubCutaneous two times a day  insulin lispro (HumaLOG) corrective regimen sliding scale   SubCutaneous every 6 hours    dextrose 5%. 1000 milliLiter(s) IV Continuous <Continuous>                ICU Vital Signs Last 24 Hrs  T(C): 37.1 (15 Mar 2018 16:00), Max: 37.1 (15 Mar 2018 16:00)  T(F): 98.8 (15 Mar 2018 16:00), Max: 98.8 (15 Mar 2018 16:00)  HR: 109 (15 Mar 2018 16:00) (60 - 110)  BP: 173/949 (15 Mar 2018 16:00) (119/68 - 193/92)  BP(mean): 126 (15 Mar 2018 16:00) (87 - 130)  ABP: --  ABP(mean): --  RR: 22 (15 Mar 2018 16:00) (9 - 22)  SpO2: 92% (15 Mar 2018 16:00) (91% - 100%)          I&O's Detail    14 Mar 2018 07:01  -  15 Mar 2018 07:00  --------------------------------------------------------  IN:    dexmedetomidine Infusion: 306 mL    multiple electrolytes Injection Type 1multiple electrolytes Injection Type 1: 1250 mL    Solution: 100 mL    Solution: 250 mL  Total IN: 1906 mL    OUT:    Indwelling Catheter - Urethral: 2740 mL    Rectal Tube: 25 mL  Total OUT: 2765 mL    Total NET: -859 mL      15 Mar 2018 07:01  -  15 Mar 2018 16:39  --------------------------------------------------------  IN:    multiple electrolytes Injection Type 1multiple electrolytes Injection Type 1: 150 mL    Oral Fluid: 240 mL    Solution: 100 mL  Total IN: 490 mL    OUT:    Indwelling Catheter - Urethral: 1060 mL  Total OUT: 1060 mL    Total NET: -570 mL            LABS:                        15.4   9.2   )-----------( 197      ( 15 Mar 2018 05:00 )             45.6     03-15    148<H>  |  104  |  57.0<H>  ----------------------------<  57<L>  3.3<L>   |  29.0  |  2.28<H>    Ca    9.4      15 Mar 2018 05:00  Phos  3.6     03-15  Mg     2.2     03-15            CAPILLARY BLOOD GLUCOSE  70 (15 Mar 2018 12:00)      POCT Blood Glucose.: 133 mg/dL (15 Mar 2018 16:35)        CULTURES:  Culture Results:   No growth at 3 days. (03-10 @ 10:29)  Culture Results:   No growth at 5 days. (03-10 @ 10:05)  Culture Results:   No growth at 5 days. (03-10 @ 10:04)      Physical Examination:    General: No acute distress.      HEENT: Pupils equal, reactive to light.  Symmetric.    PULM: Clear to auscultation bilaterally, no significant sputum production    CVS: Regular rate and rhythm, no murmurs, rubs, or gallops    ABD: Soft, nondistended, nontender, normoactive bowel sounds, no masses    EXT: No edema, nontender    SKIN: Warm and well perfused, no rashes noted.    NEURO: Alert, oriented, interactive, nonfocal    RADIOLOGY: ***    CRITICAL CARE TIME SPENT: *** Patient is a 73y old  Male who presents with a chief complaint of Unresponsive (10 Mar 2018 05:56)      BRIEF HOSPITAL COURSE: 72 y/o M with a h/o DM, CHF, HTN, CKD, shingles over the past 3 months, who presented BIBA after being found unresponsive by wife at home. Upon arrival to ED, /110, patient was unresponsive to verbal stimuli, and was intubated by Anesthesia for concerns of inability to protect airway and hypoxia. Patient was noted to have L cheek focal twitching, withdrew to noxious stimuli x 4 extremities, however had a deficit on LUE / LLE. Initial CT head negative for acute hemorrhage or infarct, no visible MCA sign. Deemed not a candidate for tPA. Later developed tonic-clonic seizure activity. EEG performed without seizure activity noted. Concern for meningoencephalitis, however, LP unremarkable. Extubated on 3/11.    Events last 24 hours: more awake, off precedex, verbalizing but not making much sense, hypoglycemic episodes overnight (pt was not taking PO)    PAST MEDICAL & SURGICAL HISTORY:  CHF (congestive heart failure)  HTN (hypertension)  Diabetes  No significant past surgical history      Review of Systems:  Cannot obtain pt encephalopathic     Medications:  acyclovir IVPB      acyclovir IVPB 880 milliGRAM(s) IV Intermittent every 12 hours    furosemide   Injectable 40 milliGRAM(s) IV Push daily  metoprolol    tartrate Injectable 2.5 milliGRAM(s) IV Push every 6 hours      levETIRAcetam  IVPB 750 milliGRAM(s) IV Intermittent every 12 hours  QUEtiapine 50 milliGRAM(s) Oral two times a day      heparin  Injectable 5000 Unit(s) SubCutaneous every 12 hours        dextrose 50% Injectable 12.5 Gram(s) IV Push once  dextrose 50% Injectable 25 Gram(s) IV Push once  dextrose 50% Injectable 25 Gram(s) IV Push once  dextrose Gel 1 Dose(s) Oral once PRN  glucagon  Injectable 1 milliGRAM(s) IntraMuscular once PRN  insulin glargine Injectable (LANTUS) 40 Unit(s) SubCutaneous two times a day  insulin lispro (HumaLOG) corrective regimen sliding scale   SubCutaneous every 6 hours    dextrose 5%. 1000 milliLiter(s) IV Continuous <Continuous>                ICU Vital Signs Last 24 Hrs  T(C): 37.1 (15 Mar 2018 16:00), Max: 37.1 (15 Mar 2018 16:00)  T(F): 98.8 (15 Mar 2018 16:00), Max: 98.8 (15 Mar 2018 16:00)  HR: 109 (15 Mar 2018 16:00) (60 - 110)  BP: 173/949 (15 Mar 2018 16:00) (119/68 - 193/92)  BP(mean): 126 (15 Mar 2018 16:00) (87 - 130)  ABP: --  ABP(mean): --  RR: 22 (15 Mar 2018 16:00) (9 - 22)  SpO2: 92% (15 Mar 2018 16:00) (91% - 100%)          I&O's Detail    14 Mar 2018 07:01  -  15 Mar 2018 07:00  --------------------------------------------------------  IN:    dexmedetomidine Infusion: 306 mL    multiple electrolytes Injection Type 1multiple electrolytes Injection Type 1: 1250 mL    Solution: 100 mL    Solution: 250 mL  Total IN: 1906 mL    OUT:    Indwelling Catheter - Urethral: 2740 mL    Rectal Tube: 25 mL  Total OUT: 2765 mL    Total NET: -859 mL      15 Mar 2018 07:01  -  15 Mar 2018 16:39  --------------------------------------------------------  IN:    multiple electrolytes Injection Type 1multiple electrolytes Injection Type 1: 150 mL    Oral Fluid: 240 mL    Solution: 100 mL  Total IN: 490 mL    OUT:    Indwelling Catheter - Urethral: 1060 mL  Total OUT: 1060 mL    Total NET: -570 mL            LABS:                        15.4   9.2   )-----------( 197      ( 15 Mar 2018 05:00 )             45.6     03-15    148<H>  |  104  |  57.0<H>  ----------------------------<  57<L>  3.3<L>   |  29.0  |  2.28<H>    Ca    9.4      15 Mar 2018 05:00  Phos  3.6     03-15  Mg     2.2     03-15            CAPILLARY BLOOD GLUCOSE  70 (15 Mar 2018 12:00)      POCT Blood Glucose.: 133 mg/dL (15 Mar 2018 16:35)        CULTURES:  Culture Results:   No growth at 3 days. (03-10 @ 10:29)  Culture Results:   No growth at 5 days. (03-10 @ 10:05)  Culture Results:   No growth at 5 days. (03-10 @ 10:04)      Physical Examination:    General: No acute distress.      HEENT: Pupils equal, reactive to light.  Symmetric.    PULM: Clear to auscultation bilaterally, no significant sputum production    CVS: Regular rate and rhythm, no murmurs, rubs, or gallops    ABD: Soft, nondistended, nontender, normoactive bowel sounds, no masses    EXT: No edema, nontender    SKIN: Warm and well perfused, stasis changes in bilateral lower extremities     NEURO: Awake, follows some simple commands, at times makes coherent statements, otherwise indiscernible     RADIOLOGY:     CRITICAL CARE TIME SPENT:

## 2018-03-16 LAB
ANION GAP SERPL CALC-SCNC: 15 MMOL/L — SIGNIFICANT CHANGE UP (ref 5–17)
BUN SERPL-MCNC: 52 MG/DL — HIGH (ref 8–20)
CALCIUM SERPL-MCNC: 9.2 MG/DL — SIGNIFICANT CHANGE UP (ref 8.6–10.2)
CHLORIDE SERPL-SCNC: 100 MMOL/L — SIGNIFICANT CHANGE UP (ref 98–107)
CO2 SERPL-SCNC: 27 MMOL/L — SIGNIFICANT CHANGE UP (ref 22–29)
CREAT SERPL-MCNC: 2.11 MG/DL — HIGH (ref 0.5–1.3)
GLUCOSE BLDC GLUCOMTR-MCNC: 101 MG/DL — HIGH (ref 70–99)
GLUCOSE BLDC GLUCOMTR-MCNC: 115 MG/DL — HIGH (ref 70–99)
GLUCOSE BLDC GLUCOMTR-MCNC: 122 MG/DL — HIGH (ref 70–99)
GLUCOSE BLDC GLUCOMTR-MCNC: 125 MG/DL — HIGH (ref 70–99)
GLUCOSE SERPL-MCNC: 113 MG/DL — SIGNIFICANT CHANGE UP (ref 70–115)
HCT VFR BLD CALC: 48.2 % — SIGNIFICANT CHANGE UP (ref 42–52)
HGB BLD-MCNC: 16 G/DL — SIGNIFICANT CHANGE UP (ref 14–18)
MAGNESIUM SERPL-MCNC: 1.9 MG/DL — SIGNIFICANT CHANGE UP (ref 1.6–2.6)
MCHC RBC-ENTMCNC: 28 PG — SIGNIFICANT CHANGE UP (ref 27–31)
MCHC RBC-ENTMCNC: 33.2 G/DL — SIGNIFICANT CHANGE UP (ref 32–36)
MCV RBC AUTO: 84.4 FL — SIGNIFICANT CHANGE UP (ref 80–94)
PHOSPHATE SERPL-MCNC: 2.6 MG/DL — SIGNIFICANT CHANGE UP (ref 2.4–4.7)
PLATELET # BLD AUTO: 256 K/UL — SIGNIFICANT CHANGE UP (ref 150–400)
POTASSIUM SERPL-MCNC: 3.4 MMOL/L — LOW (ref 3.5–5.3)
POTASSIUM SERPL-SCNC: 3.4 MMOL/L — LOW (ref 3.5–5.3)
RBC # BLD: 5.71 M/UL — SIGNIFICANT CHANGE UP (ref 4.6–6.2)
RBC # FLD: 14.7 % — SIGNIFICANT CHANGE UP (ref 11–15.6)
RPR SERPL-ACNC: SIGNIFICANT CHANGE UP
SODIUM SERPL-SCNC: 142 MMOL/L — SIGNIFICANT CHANGE UP (ref 135–145)
T PALLIDUM IGG SER QL IF: ABNORMAL
VDRL CSF-TITR: NEGATIVE — SIGNIFICANT CHANGE UP
WBC # BLD: 11.6 K/UL — HIGH (ref 4.8–10.8)
WBC # FLD AUTO: 11.6 K/UL — HIGH (ref 4.8–10.8)

## 2018-03-16 PROCEDURE — 99233 SBSQ HOSP IP/OBS HIGH 50: CPT

## 2018-03-16 PROCEDURE — 93010 ELECTROCARDIOGRAM REPORT: CPT

## 2018-03-16 PROCEDURE — 71045 X-RAY EXAM CHEST 1 VIEW: CPT | Mod: 26

## 2018-03-16 PROCEDURE — 99231 SBSQ HOSP IP/OBS SF/LOW 25: CPT

## 2018-03-16 PROCEDURE — 95819 EEG AWAKE AND ASLEEP: CPT | Mod: 26

## 2018-03-16 PROCEDURE — 99232 SBSQ HOSP IP/OBS MODERATE 35: CPT

## 2018-03-16 RX ORDER — PENICILLIN G POTASSIUM 5000000 [IU]/1
POWDER, FOR SOLUTION INTRAMUSCULAR; INTRAPLEURAL; INTRATHECAL; INTRAVENOUS
Qty: 0 | Refills: 0 | Status: DISCONTINUED | OUTPATIENT
Start: 2018-03-16 | End: 2018-03-24

## 2018-03-16 RX ORDER — LEVETIRACETAM 250 MG/1
750 TABLET, FILM COATED ORAL
Qty: 0 | Refills: 0 | Status: DISCONTINUED | OUTPATIENT
Start: 2018-03-16 | End: 2018-03-16

## 2018-03-16 RX ORDER — ISOSORBIDE MONONITRATE 60 MG/1
30 TABLET, EXTENDED RELEASE ORAL DAILY
Qty: 0 | Refills: 0 | Status: DISCONTINUED | OUTPATIENT
Start: 2018-03-16 | End: 2018-03-16

## 2018-03-16 RX ORDER — LEVETIRACETAM 250 MG/1
1000 TABLET, FILM COATED ORAL ONCE
Qty: 0 | Refills: 0 | Status: COMPLETED | OUTPATIENT
Start: 2018-03-16 | End: 2018-03-16

## 2018-03-16 RX ORDER — PENICILLIN G POTASSIUM 5000000 [IU]/1
3 POWDER, FOR SOLUTION INTRAMUSCULAR; INTRAPLEURAL; INTRATHECAL; INTRAVENOUS ONCE
Qty: 0 | Refills: 0 | Status: COMPLETED | OUTPATIENT
Start: 2018-03-16 | End: 2018-03-16

## 2018-03-16 RX ORDER — PANTOPRAZOLE SODIUM 20 MG/1
40 TABLET, DELAYED RELEASE ORAL
Qty: 0 | Refills: 0 | Status: DISCONTINUED | OUTPATIENT
Start: 2018-03-16 | End: 2018-03-19

## 2018-03-16 RX ORDER — POTASSIUM CHLORIDE 20 MEQ
10 PACKET (EA) ORAL
Qty: 0 | Refills: 0 | Status: COMPLETED | OUTPATIENT
Start: 2018-03-16 | End: 2018-03-16

## 2018-03-16 RX ORDER — ALBUTEROL 90 UG/1
2 AEROSOL, METERED ORAL EVERY 6 HOURS
Qty: 0 | Refills: 0 | Status: DISCONTINUED | OUTPATIENT
Start: 2018-03-16 | End: 2018-03-24

## 2018-03-16 RX ORDER — VALPROIC ACID (AS SODIUM SALT) 250 MG/5ML
250 SOLUTION, ORAL ORAL EVERY 8 HOURS
Qty: 0 | Refills: 0 | Status: DISCONTINUED | OUTPATIENT
Start: 2018-03-16 | End: 2018-03-16

## 2018-03-16 RX ORDER — MAGNESIUM SULFATE 500 MG/ML
2 VIAL (ML) INJECTION ONCE
Qty: 0 | Refills: 0 | Status: COMPLETED | OUTPATIENT
Start: 2018-03-16 | End: 2018-03-16

## 2018-03-16 RX ORDER — ISOSORBIDE DINITRATE 5 MG/1
10 TABLET ORAL THREE TIMES A DAY
Qty: 0 | Refills: 0 | Status: DISCONTINUED | OUTPATIENT
Start: 2018-03-16 | End: 2018-03-24

## 2018-03-16 RX ORDER — PENICILLIN G POTASSIUM 5000000 [IU]/1
3 POWDER, FOR SOLUTION INTRAMUSCULAR; INTRAPLEURAL; INTRATHECAL; INTRAVENOUS EVERY 4 HOURS
Qty: 0 | Refills: 0 | Status: DISCONTINUED | OUTPATIENT
Start: 2018-03-16 | End: 2018-03-24

## 2018-03-16 RX ORDER — CARVEDILOL PHOSPHATE 80 MG/1
6.25 CAPSULE, EXTENDED RELEASE ORAL EVERY 12 HOURS
Qty: 0 | Refills: 0 | Status: DISCONTINUED | OUTPATIENT
Start: 2018-03-16 | End: 2018-03-24

## 2018-03-16 RX ORDER — ASPIRIN/CALCIUM CARB/MAGNESIUM 324 MG
81 TABLET ORAL DAILY
Qty: 0 | Refills: 0 | Status: DISCONTINUED | OUTPATIENT
Start: 2018-03-16 | End: 2018-03-24

## 2018-03-16 RX ORDER — HYDRALAZINE HCL 50 MG
50 TABLET ORAL EVERY 8 HOURS
Qty: 0 | Refills: 0 | Status: DISCONTINUED | OUTPATIENT
Start: 2018-03-16 | End: 2018-03-16

## 2018-03-16 RX ORDER — HYDRALAZINE HCL 50 MG
20 TABLET ORAL ONCE
Qty: 0 | Refills: 0 | Status: COMPLETED | OUTPATIENT
Start: 2018-03-16 | End: 2018-03-16

## 2018-03-16 RX ORDER — ATORVASTATIN CALCIUM 80 MG/1
20 TABLET, FILM COATED ORAL AT BEDTIME
Qty: 0 | Refills: 0 | Status: DISCONTINUED | OUTPATIENT
Start: 2018-03-16 | End: 2018-03-24

## 2018-03-16 RX ORDER — VALPROIC ACID (AS SODIUM SALT) 250 MG/5ML
500 SOLUTION, ORAL ORAL EVERY 8 HOURS
Qty: 0 | Refills: 0 | Status: DISCONTINUED | OUTPATIENT
Start: 2018-03-16 | End: 2018-03-23

## 2018-03-16 RX ORDER — LEVETIRACETAM 250 MG/1
1500 TABLET, FILM COATED ORAL
Qty: 0 | Refills: 0 | Status: DISCONTINUED | OUTPATIENT
Start: 2018-03-16 | End: 2018-03-24

## 2018-03-16 RX ORDER — METOPROLOL TARTRATE 50 MG
5 TABLET ORAL ONCE
Qty: 0 | Refills: 0 | Status: COMPLETED | OUTPATIENT
Start: 2018-03-16 | End: 2018-03-16

## 2018-03-16 RX ORDER — ALLOPURINOL 300 MG
300 TABLET ORAL DAILY
Qty: 0 | Refills: 0 | Status: DISCONTINUED | OUTPATIENT
Start: 2018-03-16 | End: 2018-03-16

## 2018-03-16 RX ADMIN — LEVETIRACETAM 400 MILLIGRAM(S): 250 TABLET, FILM COATED ORAL at 05:05

## 2018-03-16 RX ADMIN — INSULIN GLARGINE 40 UNIT(S): 100 INJECTION, SOLUTION SUBCUTANEOUS at 22:13

## 2018-03-16 RX ADMIN — Medication 100 MILLIEQUIVALENT(S): at 10:45

## 2018-03-16 RX ADMIN — HEPARIN SODIUM 5000 UNIT(S): 5000 INJECTION INTRAVENOUS; SUBCUTANEOUS at 05:06

## 2018-03-16 RX ADMIN — CARVEDILOL PHOSPHATE 6.25 MILLIGRAM(S): 80 CAPSULE, EXTENDED RELEASE ORAL at 18:27

## 2018-03-16 RX ADMIN — PENICILLIN G POTASSIUM 100 MILLION UNIT(S): 5000000 POWDER, FOR SOLUTION INTRAMUSCULAR; INTRAPLEURAL; INTRATHECAL; INTRAVENOUS at 22:12

## 2018-03-16 RX ADMIN — Medication 50 GRAM(S): at 10:49

## 2018-03-16 RX ADMIN — Medication 100 MILLIEQUIVALENT(S): at 09:37

## 2018-03-16 RX ADMIN — HEPARIN SODIUM 5000 UNIT(S): 5000 INJECTION INTRAVENOUS; SUBCUTANEOUS at 18:27

## 2018-03-16 RX ADMIN — Medication 100 MILLIEQUIVALENT(S): at 11:31

## 2018-03-16 RX ADMIN — Medication 50 MILLIGRAM(S): at 05:06

## 2018-03-16 RX ADMIN — Medication 40 MILLIGRAM(S): at 05:05

## 2018-03-16 RX ADMIN — ATORVASTATIN CALCIUM 20 MILLIGRAM(S): 80 TABLET, FILM COATED ORAL at 22:15

## 2018-03-16 RX ADMIN — Medication 5 MILLIGRAM(S): at 15:51

## 2018-03-16 RX ADMIN — LEVETIRACETAM 400 MILLIGRAM(S): 250 TABLET, FILM COATED ORAL at 16:13

## 2018-03-16 RX ADMIN — Medication 27.5 MILLIGRAM(S): at 18:27

## 2018-03-16 RX ADMIN — PANTOPRAZOLE SODIUM 40 MILLIGRAM(S): 20 TABLET, DELAYED RELEASE ORAL at 18:27

## 2018-03-16 RX ADMIN — Medication 81 MILLIGRAM(S): at 11:31

## 2018-03-16 RX ADMIN — Medication 300 MILLIGRAM(S): at 11:48

## 2018-03-16 RX ADMIN — ISOSORBIDE DINITRATE 10 MILLIGRAM(S): 5 TABLET ORAL at 13:50

## 2018-03-16 RX ADMIN — Medication 267.6 MILLIGRAM(S): at 05:05

## 2018-03-16 RX ADMIN — Medication 5 MILLIGRAM(S): at 07:15

## 2018-03-16 RX ADMIN — Medication 20 MILLIGRAM(S): at 02:36

## 2018-03-16 RX ADMIN — ISOSORBIDE DINITRATE 10 MILLIGRAM(S): 5 TABLET ORAL at 22:15

## 2018-03-16 RX ADMIN — INSULIN GLARGINE 40 UNIT(S): 100 INJECTION, SOLUTION SUBCUTANEOUS at 08:03

## 2018-03-16 RX ADMIN — Medication 2.5 MILLIGRAM(S): at 05:06

## 2018-03-16 RX ADMIN — Medication 2 MILLIGRAM(S): at 14:08

## 2018-03-16 RX ADMIN — LEVETIRACETAM 1500 MILLIGRAM(S): 250 TABLET, FILM COATED ORAL at 18:35

## 2018-03-16 RX ADMIN — QUETIAPINE FUMARATE 50 MILLIGRAM(S): 200 TABLET, FILM COATED ORAL at 05:06

## 2018-03-16 NOTE — PROGRESS NOTE ADULT - PROBLEM SELECTOR PLAN 1
CSF studies negative. VZV CSF PCR sent in setting of ongoing shingles- neg. HSV CSF PCR neg as well. Acyclovir discontinued. Inconclusive RPR and VDRL.

## 2018-03-16 NOTE — PROGRESS NOTE ADULT - SUBJECTIVE AND OBJECTIVE BOX
Patient is a 73y old  Male who presents with a chief complaint of Unresponsive (10 Mar 2018 05:56)      BRIEF HOSPITAL COURSE:  74 y/o M with a h/o DM, CHF, HTN, CKD, shingles over the past 3 months, who presented BIBA after being found unresponsive by wife at home. Upon arrival to ED, /110, patient was unresponsive to verbal stimuli, and was intubated by Anesthesia for concerns of inability to protect airway and hypoxia. Patient was noted to have L cheek focal twitching, withdrew to noxious stimuli x 4 extremities, however had a deficit on LUE / LLE. Initial CT head negative for acute hemorrhage or infarct, no visible MCA sign. Deemed not a candidate for tPA. Later developed tonic-clonic seizure activity. EEG performed without seizure activity noted. Concern for meningoencephalitis, however, LP unremarkable. Extubated on 3/11. REmains encephalopathic    Events last 24 hours: sinus tachy this am responded to IV Lopressor, more lethargic today, ,spot EEG done showing PLEDs      PAST MEDICAL & SURGICAL HISTORY:  CHF (congestive heart failure)  HTN (hypertension)  Diabetes  No significant past surgical history      Review of Systems:  Cannot be obtained pt encephalopathic       Medications:    carvedilol 6.25 milliGRAM(s) Oral every 12 hours  furosemide   Injectable 40 milliGRAM(s) IV Push daily  isosorbide   dinitrate Tablet (ISORDIL) 10 milliGRAM(s) Oral three times a day    ALBUTerol    90 MICROgram(s) HFA Inhaler 2 Puff(s) Inhalation every 6 hours PRN    levETIRAcetam  Solution 1500 milliGRAM(s) Oral two times a day      aspirin  chewable 81 milliGRAM(s) Oral daily  heparin  Injectable 5000 Unit(s) SubCutaneous every 12 hours    pantoprazole  Injectable 40 milliGRAM(s) IV Push two times a day      atorvastatin 20 milliGRAM(s) Oral at bedtime  dextrose 50% Injectable 12.5 Gram(s) IV Push once  dextrose 50% Injectable 25 Gram(s) IV Push once  dextrose 50% Injectable 25 Gram(s) IV Push once  dextrose Gel 1 Dose(s) Oral once PRN  glucagon  Injectable 1 milliGRAM(s) IntraMuscular once PRN  insulin glargine Injectable (LANTUS) 40 Unit(s) SubCutaneous two times a day  insulin lispro (HumaLOG) corrective regimen sliding scale   SubCutaneous Before meals and at bedtime    dextrose 5%. 1000 milliLiter(s) IV Continuous <Continuous>                ICU Vital Signs Last 24 Hrs  T(C): 37.8 (16 Mar 2018 16:00), Max: 37.8 (16 Mar 2018 16:00)  T(F): 100 (16 Mar 2018 16:00), Max: 100 (16 Mar 2018 16:00)  HR: 117 (16 Mar 2018 17:00) (103 - 130)  BP: 168/83 (16 Mar 2018 17:00) (113/70 - 198/99)  BP(mean): 116 (16 Mar 2018 17:00) (84 - 148)  ABP: --  ABP(mean): --  RR: 36 (16 Mar 2018 17:00) (9 - 38)  SpO2: 96% (16 Mar 2018 17:00) (91% - 99%)          I&O's Detail    15 Mar 2018 07:01  -  16 Mar 2018 07:00  --------------------------------------------------------  IN:    multiple electrolytes Injection Type 1multiple electrolytes Injection Type 1: 150 mL    Oral Fluid: 630 mL    Solution: 350 mL    Solution: 200 mL  Total IN: 1330 mL    OUT:    Indwelling Catheter - Urethral: 2210 mL    Voided: 460 mL  Total OUT: 2670 mL    Total NET: -1340 mL      16 Mar 2018 07:01  -  16 Mar 2018 17:10  --------------------------------------------------------  IN:    Oral Fluid: 480 mL    Solution: 300 mL    Solution: 50 mL  Total IN: 830 mL    OUT:    Indwelling Catheter - Urethral: 825 mL  Total OUT: 825 mL    Total NET: 5 mL            LABS:                        16.0   11.6  )-----------( 256      ( 16 Mar 2018 08:11 )             48.2     03-16    142  |  100  |  52.0<H>  ----------------------------<  113  3.4<L>   |  27.0  |  2.11<H>    Ca    9.2      16 Mar 2018 08:11  Phos  2.6     03-16  Mg     1.9     03-16            CAPILLARY BLOOD GLUCOSE  70 (15 Mar 2018 12:00)      POCT Blood Glucose.: 122 mg/dL (16 Mar 2018 16:18)        CULTURES:  Culture Results:   No growth at 3 days. (03-10 @ 10:29)  Culture Results:   No growth at 5 days. (03-10 @ 10:05)  Culture Results:   No growth at 5 days. (03-10 @ 10:04)      Physical Examination:    General: No acute distress.      HEENT: Pupils equal, reactive to light.  Symmetric.    PULM: Clear to auscultation bilaterally, no significant sputum production    CVS: Regular rhythm rapid rate (sinus tach, APCs)    ABD: Soft, nondistended, nontender, normoactive bowel sounds, no masses    EXT: No edema, nontender    SKIN: Warm and well perfused, no rashes noted. stasis changes on lower extremities     NEURO: Arouses to voice, answers to his name, follows simple commands inconsistently     RADIOLOGY:     CRITICAL CARE TIME SPENT:

## 2018-03-16 NOTE — PROGRESS NOTE ADULT - SUBJECTIVE AND OBJECTIVE BOX
[facility]  INFECTIOUS DISEASES AND INTERNAL MEDICINE at [place]  =======================================================  Basilio Hoskins MD [facility] CenterRADHA Zavala MD  Diplomates American Board of Internal Medicine and Infectious Diseases  =======================================================    JEFF KATELYNN [id number]  follow up on lethargy  patient seen and examined in follow up.  Chart and labs reviewed.   Cultures negative  spoke to wife and 2 adult sons at bedside - no travel, no sick contacts, no new medications  EEG in process.     =======================================================  Allergies:  No Known Allergies    =======================================================  Medications:  ALBUTerol    90 MICROgram(s) HFA Inhaler 2 Puff(s) Inhalation every 6 hours PRN  aspirin  chewable 81 milliGRAM(s) Oral daily  atorvastatin 20 milliGRAM(s) Oral at bedtime  carvedilol 6.25 milliGRAM(s) Oral every 12 hours  dextrose 5%. 1000 milliLiter(s) IV Continuous <Continuous>  dextrose 50% Injectable 12.5 Gram(s) IV Push once  dextrose 50% Injectable 25 Gram(s) IV Push once  dextrose 50% Injectable 25 Gram(s) IV Push once  dextrose Gel 1 Dose(s) Oral once PRN  furosemide   Injectable 40 milliGRAM(s) IV Push daily  glucagon  Injectable 1 milliGRAM(s) IntraMuscular once PRN  heparin  Injectable 5000 Unit(s) SubCutaneous every 12 hours  insulin glargine Injectable (LANTUS) 40 Unit(s) SubCutaneous two times a day  insulin lispro (HumaLOG) corrective regimen sliding scale   SubCutaneous Before meals and at bedtime  isosorbide   dinitrate Tablet (ISORDIL) 10 milliGRAM(s) Oral three times a day  levETIRAcetam  Solution 1500 milliGRAM(s) Oral two times a day  pantoprazole  Injectable 40 milliGRAM(s) IV Push two times a day  valproate sodium IVPB 500 milliGRAM(s) IV Intermittent every 8 hours       =======================================================     REVIEW OF SYSTEMS:  unable to obtain  =======================================================    Physical Exam:    Vital Signs Last 24 Hrs  T(C): 37.8 (16 Mar 2018 16:00), Max: 37.8 (16 Mar 2018 16:00)  T(F): 100 (16 Mar 2018 16:00), Max: 100 (16 Mar 2018 16:00)  HR: 117 (16 Mar 2018 17:00) (103 - 130)  BP: 168/83 (16 Mar 2018 17:00) (113/70 - 198/99)  BP(mean): 116 (16 Mar 2018 17:00) (84 - 148)  RR: 36 (16 Mar 2018 17:00) (9 - 38)  SpO2: 96% (16 Mar 2018 17:00) (91% - 99%)    GEN: NAD,    HEENT: normocephalic and atraumatic. EOMI.    NECK: Supple.   LUNGS: Clear to auscultation.  HEART: Regular rate and rhythm without murmur.  ABDOMEN: Soft, nontender, and nondistended.  Positive bowel sounds.    : + christopher in place  EXTREMITIES: Without any cyanosis, clubbing, rash, lesions or edema.  MSK: no joint swelling  NEUROLOGIC: Cranial nerves II through XII are grossly intact.   SKIN: No ulceration or induration present.    =======================================================    Labs:  03-16    142  |  100  |  52.0<H>  ----------------------------<  113  3.4<L>   |  27.0  |  2.11<H>    Ca    9.2      16 Mar 2018 08:11  Phos  2.6     03-16  Mg     1.9     03-16                            16.0   11.6  )-----------( 256      ( 16 Mar 2018 08:11 )             48.2     RECENT CULTURES:  03-10 @ 10:29 .CSF CSF     No growth at 3 days.    No White blood cells  No organisms seen      03-10 @ 10:05 .Blood Blood-Peripheral     No growth at 5 days.    03-10 @ 10:04 .Blood Blood-Peripheral     No growth at 5 days.      VDRL Titer, CSF (03.14.18 @ 22:28)    VDRL Titer, CSF: Negative: Test Performed by:  [facility]  [address] [place] [address]      Syphilis Screen (03.14.18 @ 18:41)    Treponema Pallidum Antibody Interpretation: Equiv: Test repeated.  A negative treponemal  antibody screen indicates no serologic evidence of  syphilis (early infection cannot be excluded) and no additional testing  is required.   A positive screen is followed by testing for RPR.  Positive RPR indicates serologic evidence of new, inadequately treated,  or persistent syphilis infection and titer will be performed.  A negative  RPR following a positive treponemal screen may indicate adequately  treated or resolved past syphilis infection.  A negative RPR will trigger  a specific treponemal  antibody test, TPPA (treponema pallidum passive  particle agglutination).   A positive TPPA confirms previous or current  syphilis infection.   A negative TPPA suggests that the original  treponemal antibody screen was a false positive, but clinical assessment  of the patient is recommended.

## 2018-03-16 NOTE — PROGRESS NOTE ADULT - PROBLEM SELECTOR PLAN 2
Etiology unclear, likely post-ictal component. Prior EEG with PLEDs, spot EEG today shows same, trial of BZDs no improvement, Keppra dose increased. Will initiate 24 hour vEEG and start Depacon IV, follow up level

## 2018-03-16 NOTE — PROGRESS NOTE ADULT - SUBJECTIVE AND OBJECTIVE BOX
[facility]                                     Neurology at [place]                                 [facility]                                  [address]                                        [place], [place], [address]                                             [phone]      Vital signs:  T(C): 37.7 (03-16-18 @ 11:00), Max: 37.7 (03-16-18 @ 02:00)  HR: 118 (03-16-18 @ 12:00) (96 - 129)  BP: 146/75 (03-16-18 @ 12:00) (113/70 - 196/88)  RR: 33 (03-16-18 @ 12:00) (9 - 38)  SpO2: 97% (03-16-18 @ 12:00) (91% - 99%)  Wt(kg): --    Exam:    less alert/more obtunded today  not speaknig or following commands  EEG showed recurrence of PLEDs on right  perrl  face sym  moves left better than right spont  no response to pinch b/l

## 2018-03-16 NOTE — PROGRESS NOTE ADULT - SUBJECTIVE AND OBJECTIVE BOX
Patient is a 73y old  Male who presents with a chief complaint of Unresponsive (10 Mar 2018 05:56)    PAST MEDICAL & SURGICAL HISTORY:  CHF (congestive heart failure)  HTN (hypertension)  Diabetes  No significant past surgical history    KATELYNN AGUILAR   73y    Male    BRIEF HOSPITAL COURSE:    74 y/o M with a h/o DM, CHF, HTN, CKD, shingles over the past 3 months, who presented BIBA after being found unresponsive by wife at home. Upon arrival to ED, /110, patient was unresponsive to verbal stimuli, and was intubated by Anesthesia for concerns of inability to protect airway and hypoxia. Patient was noted to have L cheek focal twitching, withdrew to noxious stimuli x 4 extremities, however had a deficit on LUE / LLE. Initial CT head negative for acute hemorrhage or infarct, no visible MCA sign. Deemed not a candidate for tPA. Later developed tonic-clonic seizure activity. EEG performed without seizure activity noted. Concern for meningoencephalitis, however, LP unremarkable. Extubated on 3/11.        Review of Systems:    No HA no CP                   All other ROS are negative.    ICU Vital Signs Last 24 Hrs  T(C): 37.5 (16 Mar 2018 00:00), Max: 37.6 (15 Mar 2018 20:00)  T(F): 99.5 (16 Mar 2018 00:00), Max: 99.7 (15 Mar 2018 20:00)  HR: 110 (16 Mar 2018 01:00) (60 - 128)  BP: 193/82 (16 Mar 2018 01:00) (122/70 - 196/88)  BP(mean): 118 (16 Mar 2018 01:00) (91 - 148)  ABP: --  ABP(mean): --  RR: 15 (16 Mar 2018 01:00) (9 - 22)  SpO2: 91% (16 Mar 2018 01:00) (91% - 100%)    Physical Examination:    General:  NAD    HEENT: PERRL     PULM:  bilateral BS     CVS: s1 s2 reg    ABD: obese soft    EXT: warm  + emedma    SKIN: warm    Neuro: non focal improved mentation           LABS:                        15.4   9.2   )-----------( 197      ( 15 Mar 2018 05:00 )             45.6     03-15    148<H>  |  104  |  57.0<H>  ----------------------------<  57<L>  3.3<L>   |  29.0  |  2.28<H>    Ca    9.4      15 Mar 2018 05:00  Phos  3.6     03-15  Mg     2.2     03-15        CAPILLARY BLOOD GLUCOSE  70 (15 Mar 2018 12:00)    POCT Blood Glucose.: 204 mg/dL (15 Mar 2018 21:22)    CULTURES:  Culture Results:   No growth at 3 days. (03-10 @ 10:29)  Culture Results:   No growth at 5 days. (03-10 @ 10:05)  Culture Results:   No growth at 5 days. (03-10 @ 10:04)      Medications:  acyclovir IVPB      acyclovir IVPB 880 milliGRAM(s) IV Intermittent every 12 hours  furosemide   Injectable 40 milliGRAM(s) IV Push daily  hydrALAZINE 50 milliGRAM(s) Oral every 8 hours  hydrALAZINE Injectable 20 milliGRAM(s) IntraMuscular once  metoprolol    tartrate Injectable 2.5 milliGRAM(s) IV Push every 6 hours  levETIRAcetam  IVPB 750 milliGRAM(s) IV Intermittent every 12 hours  QUEtiapine 50 milliGRAM(s) Oral two times a day  heparin  Injectable 5000 Unit(s) SubCutaneous every 12 hours  dextrose 50% Injectable 12.5 Gram(s) IV Push once  dextrose 50% Injectable 25 Gram(s) IV Push once  dextrose 50% Injectable 25 Gram(s) IV Push once  dextrose Gel 1 Dose(s) Oral once PRN  glucagon  Injectable 1 milliGRAM(s) IntraMuscular once PRN  insulin glargine Injectable (LANTUS) 40 Unit(s) SubCutaneous two times a day  insulin lispro (HumaLOG) corrective regimen sliding scale   SubCutaneous Before meals and at bedtime  dextrose 5%. 1000 milliLiter(s) IV Continuous <Continuous>      03-14 @ 07:01  -  03-15 @ 07:00  --------------------------------------------------------  IN: 1906 mL / OUT: 2765 mL / NET: -859 mL    03-15 @ 07:01  -  03-16 @ 02:31  --------------------------------------------------------  IN: 830 mL / OUT: 1995 mL / NET: -1165 mL        RADIOLOGY/IMAGING/ECHO        < from: CT Head No Cont (03.12.18 @ 11:39) >  IMPRESSION:       No parenchymal contusion, hemorrhage or extra-axial collection.    No CT evidence of an acute territorial infarction.      < end of copied text >    Assessment/Plan:    74 y/o M with a h/o DM, CHF, HTN, shingles over the past 3 months, CKD, with acute hypoxemic respiratory failure, seizure, encephalopathy, r/o meningoencephalitis, hyperglycemia.  extubated 3/11        New onset sz  continue keppra.  No sz on EEG  AMS    etiology unclear.  ?? encephalitis    His mentation is improved over the last 24 hrs.      BP high now and was on admission too ??PRES  (RPLS)  hydralazine added.   Eventual MRI  ID to weigh in on equivocal treponema pallidum AB.  HSV and varicella in CSF neg.

## 2018-03-16 NOTE — PROGRESS NOTE ADULT - PROBLEM SELECTOR PLAN 1
Not clear if this is neurosyphilis, , Will start trial of IV penicillin    3 million units every 4 hours (75% percent of normal dose for renal dysfunction)

## 2018-03-17 LAB
ANION GAP SERPL CALC-SCNC: 17 MMOL/L — SIGNIFICANT CHANGE UP (ref 5–17)
APPEARANCE UR: CLEAR — SIGNIFICANT CHANGE UP
BACTERIA # UR AUTO: ABNORMAL
BILIRUB UR-MCNC: NEGATIVE — SIGNIFICANT CHANGE UP
BUN SERPL-MCNC: 44 MG/DL — HIGH (ref 8–20)
CALCIUM SERPL-MCNC: 9 MG/DL — SIGNIFICANT CHANGE UP (ref 8.6–10.2)
CHLORIDE SERPL-SCNC: 100 MMOL/L — SIGNIFICANT CHANGE UP (ref 98–107)
CO2 SERPL-SCNC: 25 MMOL/L — SIGNIFICANT CHANGE UP (ref 22–29)
COLOR SPEC: YELLOW — SIGNIFICANT CHANGE UP
CREAT SERPL-MCNC: 2.05 MG/DL — HIGH (ref 0.5–1.3)
DIFF PNL FLD: ABNORMAL
EPI CELLS # UR: SIGNIFICANT CHANGE UP
GLUCOSE BLDC GLUCOMTR-MCNC: 125 MG/DL — HIGH (ref 70–99)
GLUCOSE BLDC GLUCOMTR-MCNC: 141 MG/DL — HIGH (ref 70–99)
GLUCOSE BLDC GLUCOMTR-MCNC: 187 MG/DL — HIGH (ref 70–99)
GLUCOSE BLDC GLUCOMTR-MCNC: 57 MG/DL — LOW (ref 70–99)
GLUCOSE BLDC GLUCOMTR-MCNC: 97 MG/DL — SIGNIFICANT CHANGE UP (ref 70–99)
GLUCOSE BLDC GLUCOMTR-MCNC: 99 MG/DL — SIGNIFICANT CHANGE UP (ref 70–99)
GLUCOSE SERPL-MCNC: 132 MG/DL — HIGH (ref 70–115)
GLUCOSE UR QL: NEGATIVE MG/DL — SIGNIFICANT CHANGE UP
HCT VFR BLD CALC: 48.4 % — SIGNIFICANT CHANGE UP (ref 42–52)
HGB BLD-MCNC: 15.9 G/DL — SIGNIFICANT CHANGE UP (ref 14–18)
KETONES UR-MCNC: NEGATIVE — SIGNIFICANT CHANGE UP
LEUKOCYTE ESTERASE UR-ACNC: NEGATIVE — SIGNIFICANT CHANGE UP
MAGNESIUM SERPL-MCNC: 2.2 MG/DL — SIGNIFICANT CHANGE UP (ref 1.6–2.6)
MCHC RBC-ENTMCNC: 29 PG — SIGNIFICANT CHANGE UP (ref 27–31)
MCHC RBC-ENTMCNC: 32.9 G/DL — SIGNIFICANT CHANGE UP (ref 32–36)
MCV RBC AUTO: 88.3 FL — SIGNIFICANT CHANGE UP (ref 80–94)
NITRITE UR-MCNC: NEGATIVE — SIGNIFICANT CHANGE UP
PH UR: 6 — SIGNIFICANT CHANGE UP (ref 5–8)
PHOSPHATE SERPL-MCNC: 3.1 MG/DL — SIGNIFICANT CHANGE UP (ref 2.4–4.7)
PLATELET # BLD AUTO: 258 K/UL — SIGNIFICANT CHANGE UP (ref 150–400)
POTASSIUM SERPL-MCNC: 4.3 MMOL/L — SIGNIFICANT CHANGE UP (ref 3.5–5.3)
POTASSIUM SERPL-SCNC: 4.3 MMOL/L — SIGNIFICANT CHANGE UP (ref 3.5–5.3)
PROT UR-MCNC: 100 MG/DL
RBC # BLD: 5.48 M/UL — SIGNIFICANT CHANGE UP (ref 4.6–6.2)
RBC # FLD: 14.8 % — SIGNIFICANT CHANGE UP (ref 11–15.6)
RBC CASTS # UR COMP ASSIST: ABNORMAL /HPF (ref 0–4)
SODIUM SERPL-SCNC: 142 MMOL/L — SIGNIFICANT CHANGE UP (ref 135–145)
SP GR SPEC: 1.01 — SIGNIFICANT CHANGE UP (ref 1.01–1.02)
UROBILINOGEN FLD QL: NEGATIVE MG/DL — SIGNIFICANT CHANGE UP
WBC # BLD: 11.9 K/UL — HIGH (ref 4.8–10.8)
WBC # FLD AUTO: 11.9 K/UL — HIGH (ref 4.8–10.8)
WBC UR QL: SIGNIFICANT CHANGE UP

## 2018-03-17 PROCEDURE — 99233 SBSQ HOSP IP/OBS HIGH 50: CPT

## 2018-03-17 PROCEDURE — 99231 SBSQ HOSP IP/OBS SF/LOW 25: CPT

## 2018-03-17 PROCEDURE — 95951: CPT | Mod: 26

## 2018-03-17 RX ORDER — DEXTROSE 50 % IN WATER 50 %
12.5 SYRINGE (ML) INTRAVENOUS ONCE
Qty: 0 | Refills: 0 | Status: COMPLETED | OUTPATIENT
Start: 2018-03-17 | End: 2018-03-17

## 2018-03-17 RX ORDER — FINASTERIDE 5 MG/1
5 TABLET, FILM COATED ORAL DAILY
Qty: 0 | Refills: 0 | Status: DISCONTINUED | OUTPATIENT
Start: 2018-03-17 | End: 2018-03-24

## 2018-03-17 RX ORDER — DEXTROSE 50 % IN WATER 50 %
25 SYRINGE (ML) INTRAVENOUS ONCE
Qty: 0 | Refills: 0 | Status: COMPLETED | OUTPATIENT
Start: 2018-03-17 | End: 2018-03-17

## 2018-03-17 RX ADMIN — INSULIN GLARGINE 40 UNIT(S): 100 INJECTION, SOLUTION SUBCUTANEOUS at 05:25

## 2018-03-17 RX ADMIN — PANTOPRAZOLE SODIUM 40 MILLIGRAM(S): 20 TABLET, DELAYED RELEASE ORAL at 17:12

## 2018-03-17 RX ADMIN — LEVETIRACETAM 1500 MILLIGRAM(S): 250 TABLET, FILM COATED ORAL at 17:13

## 2018-03-17 RX ADMIN — ISOSORBIDE DINITRATE 10 MILLIGRAM(S): 5 TABLET ORAL at 14:46

## 2018-03-17 RX ADMIN — ISOSORBIDE DINITRATE 10 MILLIGRAM(S): 5 TABLET ORAL at 22:26

## 2018-03-17 RX ADMIN — Medication 27.5 MILLIGRAM(S): at 17:12

## 2018-03-17 RX ADMIN — Medication 12.5 GRAM(S): at 17:10

## 2018-03-17 RX ADMIN — PANTOPRAZOLE SODIUM 40 MILLIGRAM(S): 20 TABLET, DELAYED RELEASE ORAL at 05:26

## 2018-03-17 RX ADMIN — PENICILLIN G POTASSIUM 100 MILLION UNIT(S): 5000000 POWDER, FOR SOLUTION INTRAMUSCULAR; INTRAPLEURAL; INTRATHECAL; INTRAVENOUS at 14:46

## 2018-03-17 RX ADMIN — ATORVASTATIN CALCIUM 20 MILLIGRAM(S): 80 TABLET, FILM COATED ORAL at 22:26

## 2018-03-17 RX ADMIN — HEPARIN SODIUM 5000 UNIT(S): 5000 INJECTION INTRAVENOUS; SUBCUTANEOUS at 17:13

## 2018-03-17 RX ADMIN — FINASTERIDE 5 MILLIGRAM(S): 5 TABLET, FILM COATED ORAL at 17:13

## 2018-03-17 RX ADMIN — PENICILLIN G POTASSIUM 100 MILLION UNIT(S): 5000000 POWDER, FOR SOLUTION INTRAMUSCULAR; INTRAPLEURAL; INTRATHECAL; INTRAVENOUS at 22:28

## 2018-03-17 RX ADMIN — Medication 81 MILLIGRAM(S): at 12:06

## 2018-03-17 RX ADMIN — PENICILLIN G POTASSIUM 100 MILLION UNIT(S): 5000000 POWDER, FOR SOLUTION INTRAMUSCULAR; INTRAPLEURAL; INTRATHECAL; INTRAVENOUS at 10:31

## 2018-03-17 RX ADMIN — ISOSORBIDE DINITRATE 10 MILLIGRAM(S): 5 TABLET ORAL at 05:13

## 2018-03-17 RX ADMIN — PENICILLIN G POTASSIUM 100 MILLION UNIT(S): 5000000 POWDER, FOR SOLUTION INTRAMUSCULAR; INTRAPLEURAL; INTRATHECAL; INTRAVENOUS at 17:12

## 2018-03-17 RX ADMIN — LEVETIRACETAM 1500 MILLIGRAM(S): 250 TABLET, FILM COATED ORAL at 05:12

## 2018-03-17 RX ADMIN — Medication 27.5 MILLIGRAM(S): at 09:17

## 2018-03-17 RX ADMIN — HEPARIN SODIUM 5000 UNIT(S): 5000 INJECTION INTRAVENOUS; SUBCUTANEOUS at 05:13

## 2018-03-17 RX ADMIN — Medication 40 MILLIGRAM(S): at 05:11

## 2018-03-17 RX ADMIN — PENICILLIN G POTASSIUM 100 MILLION UNIT(S): 5000000 POWDER, FOR SOLUTION INTRAMUSCULAR; INTRAPLEURAL; INTRATHECAL; INTRAVENOUS at 05:14

## 2018-03-17 RX ADMIN — INSULIN GLARGINE 40 UNIT(S): 100 INJECTION, SOLUTION SUBCUTANEOUS at 22:28

## 2018-03-17 RX ADMIN — Medication 25 GRAM(S): at 17:32

## 2018-03-17 RX ADMIN — Medication 27.5 MILLIGRAM(S): at 02:28

## 2018-03-17 RX ADMIN — CARVEDILOL PHOSPHATE 6.25 MILLIGRAM(S): 80 CAPSULE, EXTENDED RELEASE ORAL at 05:58

## 2018-03-17 RX ADMIN — PENICILLIN G POTASSIUM 100 MILLION UNIT(S): 5000000 POWDER, FOR SOLUTION INTRAMUSCULAR; INTRAPLEURAL; INTRATHECAL; INTRAVENOUS at 02:28

## 2018-03-17 RX ADMIN — CARVEDILOL PHOSPHATE 6.25 MILLIGRAM(S): 80 CAPSULE, EXTENDED RELEASE ORAL at 17:12

## 2018-03-17 NOTE — PROGRESS NOTE ADULT - PROBLEM SELECTOR PLAN 6
BUN/Cr around baseline- will trend. will try to give flomax although if too lethargic to swallow pills cannot crush and put down NGT may switch to proscar

## 2018-03-17 NOTE — PROGRESS NOTE ADULT - PROBLEM SELECTOR PLAN 5
Cr at baseline, euvolemic  Continue to monitor renal function and electrolytes  Replace electrolytes as needed  Monitor I&Os, daily weights  Renally dose meds  Avoid nephrotoxic agents

## 2018-03-17 NOTE — EEG REPORT - NS EEG TEXT BOX
[facility]  Report of Continuous Video EEG    [facility]: [address], [place] [address], Ph#: [phone]  [facility]: [id number] [place] [address], [place] [address], Ph#: [phone]  Office: [address], [place] [address] Ph#: [phone]    Patient Name: Reynaldo Clemens    Age: 73 y, : 1945  Patient ID: -, MRN #: -, Traore: -  Referring Physician: Vickie Gunderson  EEG #: [id number]    Study Date: 3/10/2018    Study Information:    EEG Recording Technique:  The patient underwent continuous Video-EEG monitoring, using Telemetry System hardware on the XLTek Digital System. EEG and video data were stored on a computer hard drive with important events saved in digital archive files. The material was reviewed by a physician (electroencephalographer / epileptologist) on a daily basis. Antonio and seizure detection algorithms were utilized and reviewed. An EEG Technician attended to the patient, and was available throughout daytime work hours.  The epilepsy center neurologist was available in person or on call 24-hours per day.    EEG Placement and Labeling of Electrodes:  The EEG was performed utilizing 20 channel referential EEG connections (coronal over temporal over parasagittal montage) using all standard 10-20 electrode placements with EKG, with additional electrodes placed in the inferior temporal region using the modified 10-10 montage electrode placements for elective admissions, or if deemed necessary. Recording was at a sampling rate of 256 samples per second per channel. Time synchronized digital video recording was done simultaneously with EEG recording. A low light infrared camera was used for low light recording.     History: Seizures  -  -    Medication	  Insulin	  Keppra	  -	  precedex	  Ativan	    Study Interpretation:    FINDINGS:  The background consists of diffuse low amplitude 10-20 UV mixed frequency alpha and beta activity over the left hemisphere admixed beta. Lower amplitude alpha/beta seen over the right hemisphere with voltage suppression over the right anterior-mid temporal region.      Approximately 1 Hz right hemisphere PLEDS were seen continuously.  Compared to prior recording on 3/11, Pleds are seen more posteriorly in the right temporal and parieto-occipital region.    Background Slowing:  Mild generalized slowing .    Focal Slowing:   Right hemisphere with a temporal predominance.    Sleep Background:  Normal sleep transients not recorded.    Other Paroxysmal Non-Epileptiform Findings:  None were present.    Interictal Epileptiform Activity:   Right  centro-temporal PLEDs     Events:  Clinical events: None recorded.  Seizures: None recorded.    Artifacts:  Intermittent myogenic and movement artifacts were noted.      EEG Impression:  Abnormal VEEG due to the presence of:  1.	Mild generalized slowing   2.	Improvement in background compared with 3/11 veeg. Still with voltage suppression over right anterior temporal region.  3.	Re-appearance of right hemisphere PLEDs although more posteriorly located in the parieto-temporal region.  4.	No clinical seizures    Clinical Correlation:  The study is consistent with a resolving toxic-metabolic encephalopathy and re-emergence of right hemisphere PLEDS in a more posterior location. Correlation with imaging recommended.      Lb Reyes MD  Director of Neurology, [facility]  Attending Epileptologist and , [facility]

## 2018-03-17 NOTE — PROGRESS NOTE ADULT - PROBLEM SELECTOR PLAN 1
CSF studies negative. VZV CSF PCR sent in setting of ongoing shingles- neg. HSV CSF PCR neg as well. Acyclovir discontinued. Inconclusive treponema testing - empiric PCN G started yesterday

## 2018-03-17 NOTE — PROGRESS NOTE ADULT - SUBJECTIVE AND OBJECTIVE BOX
[facility]                                     Neurology at [place]                                 [facility]                                  [address]                                        [place], [place], [address]                                             [phone]      Vital signs:  T(C): 38.4 (03-17-18 @ 10:00), Max: 38.4 (03-17-18 @ 10:00)  HR: 116 (03-17-18 @ 11:00) (102 - 130)  BP: 125/80 (03-17-18 @ 11:00) (125/80 - 198/99)  RR: 21 (03-17-18 @ 11:00) (16 - 36)  SpO2: 96% (03-17-18 @ 11:00) (96% - 98%)  Wt(kg): --    Exam:    No new complaints.  slightly more alert, when prodded he answers OK  Pupils react.  Face symmetric   minimal movement in 4 limbs   no grimace to pinch in 4 ext    quick look at eeg running shows resolution of PLEDs

## 2018-03-17 NOTE — PROGRESS NOTE ADULT - PROBLEM SELECTOR PLAN 2
Etiology unclear, ?NCSE. EEG with PLEDs, somewhat improving after addition of Depacon  pt remains encephalopathic

## 2018-03-17 NOTE — PROGRESS NOTE ADULT - PROBLEM SELECTOR PLAN 1
Toxic metabolic encephalopathy of unclear etiology, no seizures on EEG  On PCN for neurosyphilis as possible etiology  Continue to monitor serial neuro exams  HOB > 30 deg for aspiration precautions  NGT with TF

## 2018-03-17 NOTE — PROGRESS NOTE ADULT - PROBLEM SELECTOR PLAN 2
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

## 2018-03-17 NOTE — PROGRESS NOTE ADULT - SUBJECTIVE AND OBJECTIVE BOX
Patient is a 73y old  Male who presents with a chief complaint of Unresponsive (10 Mar 2018 05:56)      BRIEF HOSPITAL COURSE: 74 y/o M with a h/o DM, CHF, HTN, CKD, shingles over the past 3 months, who presented BIBA after being found unresponsive by wife at home. Upon arrival to ED, /110, patient was unresponsive to verbal stimuli, and was intubated by Anesthesia for concerns of inability to protect airway and hypoxia. Patient was noted to have L cheek focal twitching, withdrew to noxious stimuli x 4 extremities, however had a deficit on LUE / LLE. Initial CT head negative for acute hemorrhage or infarct, no visible MCA sign. Deemed not a candidate for tPA. Later developed tonic-clonic seizure activity. EEG performed without seizure activity noted. Concern for meningoencephalitis, however, LP unremarkable. Extubated on 3/11. REmains encephalopathic    Events last 24 hours: sinus tachy, EEG w PLEDs, more encephalopathic than prior day    PAST MEDICAL & SURGICAL HISTORY:  CHF (congestive heart failure)  HTN (hypertension)  Diabetes  No significant past surgical history      Review of Systems:  Cannot be obtained pt is encephalopathic       Medications:  penicillin   G  potassium  IVPB      penicillin   G  potassium  IVPB 3 Million Unit(s) IV Intermittent every 4 hours    carvedilol 6.25 milliGRAM(s) Oral every 12 hours  furosemide   Injectable 40 milliGRAM(s) IV Push daily  isosorbide   dinitrate Tablet (ISORDIL) 10 milliGRAM(s) Oral three times a day    ALBUTerol    90 MICROgram(s) HFA Inhaler 2 Puff(s) Inhalation every 6 hours PRN    levETIRAcetam  Solution 1500 milliGRAM(s) Oral two times a day  valproate sodium IVPB 500 milliGRAM(s) IV Intermittent every 8 hours      aspirin  chewable 81 milliGRAM(s) Oral daily  heparin  Injectable 5000 Unit(s) SubCutaneous every 12 hours    pantoprazole  Injectable 40 milliGRAM(s) IV Push two times a day      atorvastatin 20 milliGRAM(s) Oral at bedtime  dextrose 50% Injectable 12.5 Gram(s) IV Push once  dextrose 50% Injectable 25 Gram(s) IV Push once  dextrose 50% Injectable 25 Gram(s) IV Push once  dextrose Gel 1 Dose(s) Oral once PRN  glucagon  Injectable 1 milliGRAM(s) IntraMuscular once PRN  insulin glargine Injectable (LANTUS) 40 Unit(s) SubCutaneous two times a day  insulin lispro (HumaLOG) corrective regimen sliding scale   SubCutaneous Before meals and at bedtime    dextrose 5%. 1000 milliLiter(s) IV Continuous <Continuous>                ICU Vital Signs Last 24 Hrs  T(C): 38.2 (17 Mar 2018 12:00), Max: 38.4 (17 Mar 2018 10:00)  T(F): 100.8 (17 Mar 2018 12:00), Max: 101.1 (17 Mar 2018 10:00)  HR: 115 (17 Mar 2018 13:00) (102 - 130)  BP: 165/82 (17 Mar 2018 13:00) (125/80 - 198/94)  BP(mean): 112 (17 Mar 2018 13:00) (98 - 136)  ABP: --  ABP(mean): --  RR: 21 (17 Mar 2018 13:00) (16 - 36)  SpO2: 96% (17 Mar 2018 13:00) (96% - 98%)          I&O's Detail    16 Mar 2018 07:01  -  17 Mar 2018 07:00  --------------------------------------------------------  IN:    Enteral Tube Flush: 100 mL    Oral Fluid: 480 mL    Solution: 200 mL    Solution: 50 mL    Solution: 300 mL    Solution: 50 mL  Total IN: 1180 mL    OUT:    Indwelling Catheter - Urethral: 2145 mL  Total OUT: 2145 mL    Total NET: -965 mL      17 Mar 2018 07:01  -  17 Mar 2018 14:10  --------------------------------------------------------  IN:    Solution: 50 mL    Solution: 100 mL  Total IN: 150 mL    OUT:    Indwelling Catheter - Urethral: 610 mL  Total OUT: 610 mL    Total NET: -460 mL            LABS:                        15.9   11.9  )-----------( 258      ( 17 Mar 2018 05:15 )             48.4     -17    142  |  100  |  44.0<H>  ----------------------------<  132<H>  4.3   |  25.0  |  2.05<H>    Ca    9.0      17 Mar 2018 05:15  Phos  3.1     -  Mg     2.2     -            CAPILLARY BLOOD GLUCOSE  115 (16 Mar 2018 22:00)      POCT Blood Glucose.: 99 mg/dL (17 Mar 2018 12:01)      Urinalysis Basic - ( 17 Mar 2018 10:20 )    Color: Yellow / Appearance: Clear / S.010 / pH: x  Gluc: x / Ketone: Negative  / Bili: Negative / Urobili: Negative mg/dL   Blood: x / Protein: 100 mg/dL / Nitrite: Negative   Leuk Esterase: Negative / RBC: 6-10 /HPF / WBC 0-2   Sq Epi: x / Non Sq Epi: Few / Bacteria: Occasional      CULTURES:      Physical Examination:    General: No acute distress.      HEENT: Pupils equal, reactive to light.  Symmetric.    PULM: Coarse to auscultation bilaterally, no significant sputum production    CVS: tachycardia     ABD: Soft, nondistended, nontender, normoactive bowel sounds, no masses    EXT: No edema, nontender    SKIN: Warm and well perfused, no rashes noted, stasis on lower extremities     NEURO: Awake, sluggish, responding to family, saying some simple one words like "water" Cesar     RADIOLOGY:     < from: Xray Chest 1 View- PORTABLE-Urgent (18 @ 18:09) >  FINDINGS:    The lungs  are clear.  No pleural abnormality is seen.       NG tube tip beyond GE junction.  The  heart is enlarged in transverse diameter. No hilar mass. Trachea   midline.  Cardiac device wire leads are within right atrium and right   ventricle.    Visualized osseous structures are intact.        IMPRESSION: NG tube tip beyond GE junction .                LB MORA M.D., ATTENDING RADIOLOGIST  This document has been electronically signed. Mar 17 2018  9:20AM        < end of copied text >    Study Information:    EEG Recording Technique:  The patient underwent continuous Video-EEG monitoring, using Telemetry System hardware on the XLTek Digital System. EEG and video data were stored on a computer hard drive with important events saved in digital archive files. The material was reviewed by a physician (electroencephalographer / epileptologist) on a daily basis. Antonio and seizure detection algorithms were utilized and reviewed. An EEG Technician attended to the patient, and was available throughout daytime work hours.  The epilepsy center neurologist was available in person or on call 24-hours per day.    EEG Placement and Labeling of Electrodes:  The EEG was performed utilizing 20 channel referential EEG connections (coronal over temporal over parasagittal montage) using all standard 10-20 electrode placements with EKG, with additional electrodes placed in the inferior temporal region using the modified 10-10 montage electrode placements for elective admissions, or if deemed necessary. Recording was at a sampling rate of 256 samples per second per channel. Time synchronized digital video recording was done simultaneously with EEG recording. A low light infrared camera was used for low light recording.     History: Seizures  -  -    Medication	  Insulin	  Keppra	  -	  precedex	  Ativan	    Study Interpretation:    FINDINGS:  The background consists of diffuse low amplitude 10-20 UV mixed frequency alpha and beta activity over the left hemisphere admixed beta. Lower amplitude alpha/beta seen over the right hemisphere with voltage suppression over the right anterior-mid temporal region.      Approximately 1 Hz right hemisphere PLEDS were seen continuously.  Compared to prior recording on 3/11, Pleds are seen more posteriorly in the right temporal and parieto-occipital region.    Background Slowing:  Mild generalized slowing .    Focal Slowing:   Right hemisphere with a temporal predominance.    Sleep Background:  Normal sleep transients not recorded.    Other Paroxysmal Non-Epileptiform Findings:  None were present.    Interictal Epileptiform Activity:   Right  centro-temporal PLEDs     Events:  Clinical events: None recorded.  Seizures: None recorded.    Artifacts:  Intermittent myogenic and movement artifacts were noted.      EEG Impression:  Abnormal VEEG due to the presence of:  -[id number].	Mild generalized slowing   -[id number].	Improvement in background compared with 3/11 veeg. Still with voltage suppression over right anterior temporal region.  -[id number].	Re-appearance of right hemisphere PLEDs although more posteriorly located in the parieto-temporal region.  -[id number].	No clinical seizures    Clinical Correlation:  The study is consistent with a resolving toxic-metabolic encephalopathy and re-emergence of right hemisphere PLEDS in a more posterior location. Correlation with imaging recommended.      Lb Reyes MD  Director of Neurology, [facility]  Attending Epileptologist and , [facility]          Electronic Signatures:  Lb Reyes)  (Signed 17-Mar-2018 07:52)  	Authored: EEG REPORT      Last Updated: 17-Mar-2018 07:52 by Lb Reyes)  CRITICAL CARE TIME SPENT:

## 2018-03-17 NOTE — PROGRESS NOTE ADULT - SUBJECTIVE AND OBJECTIVE BOX
Patient is a 73y old  Male who presents with a chief complaint of Unresponsive (10 Mar 2018 05:56)      BRIEF HOSPITAL COURSE: 72 yo male, PMHx DM, HF, HTN, who presented BIBA after being found unresponsive by wife at home, intubated for hypoxemic respiratory failure, r/o acute CVA vs encephalopathy post-ictal state secondary to seizures. Concerns for encephalitis secondary to VZV meningitis in differential, which was negative, Syphilis equivocal.     Events last 24 hours: Persistently encephalopathic, had a decline in mental status, new PLEDS on EEG posterior to previous.     PAST MEDICAL & SURGICAL HISTORY:  CHF (congestive heart failure)  HTN (hypertension)  Diabetes  No significant past surgical history      Review of Systems: unable to obtain, patient encephalopathic      Medications:  penicillin   G  potassium  IVPB      penicillin   G  potassium  IVPB 3 Million Unit(s) IV Intermittent every 4 hours  carvedilol 6.25 milliGRAM(s) Oral every 12 hours  furosemide   Injectable 40 milliGRAM(s) IV Push daily  isosorbide   dinitrate Tablet (ISORDIL) 10 milliGRAM(s) Oral three times a day  ALBUTerol    90 MICROgram(s) HFA Inhaler 2 Puff(s) Inhalation every 6 hours PRN  levETIRAcetam  Solution 1500 milliGRAM(s) Oral two times a day  valproate sodium IVPB 500 milliGRAM(s) IV Intermittent every 8 hours  aspirin  chewable 81 milliGRAM(s) Oral daily  heparin  Injectable 5000 Unit(s) SubCutaneous every 12 hours  pantoprazole  Injectable 40 milliGRAM(s) IV Push two times a day  atorvastatin 20 milliGRAM(s) Oral at bedtime  dextrose 50% Injectable 12.5 Gram(s) IV Push once  dextrose 50% Injectable 25 Gram(s) IV Push once  dextrose 50% Injectable 25 Gram(s) IV Push once  dextrose Gel 1 Dose(s) Oral once PRN  finasteride 5 milliGRAM(s) Oral daily  glucagon  Injectable 1 milliGRAM(s) IntraMuscular once PRN  insulin glargine Injectable (LANTUS) 40 Unit(s) SubCutaneous two times a day  insulin lispro (HumaLOG) corrective regimen sliding scale   SubCutaneous Before meals and at bedtime  dextrose 5%. 1000 milliLiter(s) IV Continuous <Continuous>      ICU Vital Signs Last 24 Hrs  T(C): 37.7 (17 Mar 2018 16:00), Max: 38.4 (17 Mar 2018 10:00)  T(F): 99.9 (17 Mar 2018 16:00), Max: 101.1 (17 Mar 2018 10:00)  HR: 106 (17 Mar 2018 18:00) (102 - 124)  BP: 148/71 (17 Mar 2018 18:00) (125/80 - 198/94)  BP(mean): 102 (17 Mar 2018 18:00) (95 - 136)  ABP: --  ABP(mean): --  RR: 18 (17 Mar 2018 18:00) (17 - 28)  SpO2: 98% (17 Mar 2018 18:00) (96% - 98%)          I&O's Detail    16 Mar 2018 07:01  -  17 Mar 2018 07:00  --------------------------------------------------------  IN:    Enteral Tube Flush: 100 mL    Oral Fluid: 480 mL    Solution: 50 mL    Solution: 300 mL    Solution: 50 mL    Solution: 200 mL  Total IN: 1180 mL    OUT:    Indwelling Catheter - Urethral: 2145 mL  Total OUT: 2145 mL    Total NET: -965 mL      17 Mar 2018 07:01  -  17 Mar 2018 23:10  --------------------------------------------------------  IN:    Free Water: 200 mL    Glucerna: 40 mL    Solution: 300 mL    Solution: 100 mL  Total IN: 640 mL    OUT:    Indwelling Catheter - Urethral: 845 mL  Total OUT: 845 mL    Total NET: -205 mL            LABS:                        15.9   11.9  )-----------( 258      ( 17 Mar 2018 05:15 )             48.4     -    142  |  100  |  44.0<H>  ----------------------------<  132<H>  4.3   |  25.0  |  2.05<H>    Ca    9.0      17 Mar 2018 05:15  Phos  3.1     -  Mg     2.2                 CAPILLARY BLOOD GLUCOSE  115 (16 Mar 2018 22:00)      POCT Blood Glucose.: 125 mg/dL (17 Mar 2018 22:18)      Urinalysis Basic - ( 17 Mar 2018 10:20 )    Color: Yellow / Appearance: Clear / S.010 / pH: x  Gluc: x / Ketone: Negative  / Bili: Negative / Urobili: Negative mg/dL   Blood: x / Protein: 100 mg/dL / Nitrite: Negative   Leuk Esterase: Negative / RBC: 6-10 /HPF / WBC 0-2   Sq Epi: x / Non Sq Epi: Few / Bacteria: Occasional      CULTURES:      Physical Examination:    General: Obese male lying in bed in no acute distress.      HEENT: Pupils equal, reactive to light.  Symmetric.    PULM: Clear to auscultation bilaterally, no significant sputum production    CVS: Sinus tachycardia, no murmurs, rubs, or gallops    ABD: Soft, nondistended, nontender, normoactive bowel sounds, no masses    EXT: Generalized anasarca, nontender    SKIN: Warm and well perfused, no rashes noted.    NEURO: Awake, moving all extremities    RADIOLOGY: old images reviewed    EEG:  EEG Impression:  Abnormal VEEG due to the presence of:  -[id number].	Mild generalized slowing   -[id number].	Improvement in background compared with 3/11 veeg. Still with voltage suppression over right anterior temporal region.  -[id number].	Re-appearance of right hemisphere PLEDs although more posteriorly located in the parieto-temporal region.  -[id number].	No clinical seizures    Clinical Correlation:  The study is consistent with a resolving toxic-metabolic encephalopathy and re-emergence of right hemisphere PLEDS in a more posterior location. Correlation with imaging recommended.      Lb Reyes MD  Director of Neurology, [facility]  Attending Epileptologist and , [facility]          Electronic Signatures:  Lb Reyes)  (Signed 17-Mar-2018 07:52)    CRITICAL CARE TIME SPENT: I have spent 30 minutes of critical care time evaluating and treating this patient, including reviewing charts, labs, imagining studies, and collaborating with interdisciplinary team.

## 2018-03-18 LAB
ANION GAP SERPL CALC-SCNC: 16 MMOL/L — SIGNIFICANT CHANGE UP (ref 5–17)
BUN SERPL-MCNC: 43 MG/DL — HIGH (ref 8–20)
CALCIUM SERPL-MCNC: 9 MG/DL — SIGNIFICANT CHANGE UP (ref 8.6–10.2)
CHLORIDE SERPL-SCNC: 101 MMOL/L — SIGNIFICANT CHANGE UP (ref 98–107)
CO2 SERPL-SCNC: 30 MMOL/L — HIGH (ref 22–29)
CREAT SERPL-MCNC: 2.18 MG/DL — HIGH (ref 0.5–1.3)
GLUCOSE BLDC GLUCOMTR-MCNC: 128 MG/DL — HIGH (ref 70–99)
GLUCOSE BLDC GLUCOMTR-MCNC: 151 MG/DL — HIGH (ref 70–99)
GLUCOSE BLDC GLUCOMTR-MCNC: 172 MG/DL — HIGH (ref 70–99)
GLUCOSE BLDC GLUCOMTR-MCNC: 174 MG/DL — HIGH (ref 70–99)
GLUCOSE SERPL-MCNC: 105 MG/DL — SIGNIFICANT CHANGE UP (ref 70–115)
HCT VFR BLD CALC: 53.2 % — HIGH (ref 42–52)
HGB BLD-MCNC: 17.5 G/DL — SIGNIFICANT CHANGE UP (ref 14–18)
MAGNESIUM SERPL-MCNC: 2.2 MG/DL — SIGNIFICANT CHANGE UP (ref 1.8–2.6)
MCHC RBC-ENTMCNC: 29.2 PG — SIGNIFICANT CHANGE UP (ref 27–31)
MCHC RBC-ENTMCNC: 32.9 G/DL — SIGNIFICANT CHANGE UP (ref 32–36)
MCV RBC AUTO: 88.8 FL — SIGNIFICANT CHANGE UP (ref 80–94)
PHOSPHATE SERPL-MCNC: 3.2 MG/DL — SIGNIFICANT CHANGE UP (ref 2.4–4.7)
PLATELET # BLD AUTO: 244 K/UL — SIGNIFICANT CHANGE UP (ref 150–400)
POTASSIUM SERPL-MCNC: 4.3 MMOL/L — SIGNIFICANT CHANGE UP (ref 3.5–5.3)
POTASSIUM SERPL-SCNC: 4.3 MMOL/L — SIGNIFICANT CHANGE UP (ref 3.5–5.3)
RBC # BLD: 5.99 M/UL — SIGNIFICANT CHANGE UP (ref 4.6–6.2)
RBC # FLD: 15.7 % — HIGH (ref 11–15.6)
SODIUM SERPL-SCNC: 147 MMOL/L — HIGH (ref 135–145)
VALPROATE SERPL-MCNC: 51.6 UG/ML — SIGNIFICANT CHANGE UP (ref 50–100)
WBC # BLD: 14.6 K/UL — HIGH (ref 4.8–10.8)
WBC # FLD AUTO: 14.6 K/UL — HIGH (ref 4.8–10.8)

## 2018-03-18 PROCEDURE — 99233 SBSQ HOSP IP/OBS HIGH 50: CPT

## 2018-03-18 PROCEDURE — 95951: CPT | Mod: 26

## 2018-03-18 PROCEDURE — 99291 CRITICAL CARE FIRST HOUR: CPT

## 2018-03-18 PROCEDURE — 99231 SBSQ HOSP IP/OBS SF/LOW 25: CPT

## 2018-03-18 RX ORDER — INSULIN LISPRO 100/ML
VIAL (ML) SUBCUTANEOUS
Qty: 0 | Refills: 0 | Status: DISCONTINUED | OUTPATIENT
Start: 2018-03-18 | End: 2018-03-23

## 2018-03-18 RX ADMIN — PANTOPRAZOLE SODIUM 40 MILLIGRAM(S): 20 TABLET, DELAYED RELEASE ORAL at 17:42

## 2018-03-18 RX ADMIN — HEPARIN SODIUM 5000 UNIT(S): 5000 INJECTION INTRAVENOUS; SUBCUTANEOUS at 06:16

## 2018-03-18 RX ADMIN — Medication 27.5 MILLIGRAM(S): at 06:13

## 2018-03-18 RX ADMIN — ISOSORBIDE DINITRATE 10 MILLIGRAM(S): 5 TABLET ORAL at 13:13

## 2018-03-18 RX ADMIN — INSULIN GLARGINE 40 UNIT(S): 100 INJECTION, SOLUTION SUBCUTANEOUS at 22:22

## 2018-03-18 RX ADMIN — HEPARIN SODIUM 5000 UNIT(S): 5000 INJECTION INTRAVENOUS; SUBCUTANEOUS at 17:42

## 2018-03-18 RX ADMIN — LEVETIRACETAM 1500 MILLIGRAM(S): 250 TABLET, FILM COATED ORAL at 17:42

## 2018-03-18 RX ADMIN — ISOSORBIDE DINITRATE 10 MILLIGRAM(S): 5 TABLET ORAL at 06:16

## 2018-03-18 RX ADMIN — ATORVASTATIN CALCIUM 20 MILLIGRAM(S): 80 TABLET, FILM COATED ORAL at 22:08

## 2018-03-18 RX ADMIN — Medication 2: at 22:21

## 2018-03-18 RX ADMIN — PENICILLIN G POTASSIUM 100 MILLION UNIT(S): 5000000 POWDER, FOR SOLUTION INTRAMUSCULAR; INTRAPLEURAL; INTRATHECAL; INTRAVENOUS at 06:23

## 2018-03-18 RX ADMIN — Medication 2: at 17:41

## 2018-03-18 RX ADMIN — PENICILLIN G POTASSIUM 100 MILLION UNIT(S): 5000000 POWDER, FOR SOLUTION INTRAMUSCULAR; INTRAPLEURAL; INTRATHECAL; INTRAVENOUS at 17:42

## 2018-03-18 RX ADMIN — PENICILLIN G POTASSIUM 100 MILLION UNIT(S): 5000000 POWDER, FOR SOLUTION INTRAMUSCULAR; INTRAPLEURAL; INTRATHECAL; INTRAVENOUS at 02:47

## 2018-03-18 RX ADMIN — Medication 81 MILLIGRAM(S): at 11:24

## 2018-03-18 RX ADMIN — CARVEDILOL PHOSPHATE 6.25 MILLIGRAM(S): 80 CAPSULE, EXTENDED RELEASE ORAL at 17:42

## 2018-03-18 RX ADMIN — FINASTERIDE 5 MILLIGRAM(S): 5 TABLET, FILM COATED ORAL at 11:24

## 2018-03-18 RX ADMIN — PANTOPRAZOLE SODIUM 40 MILLIGRAM(S): 20 TABLET, DELAYED RELEASE ORAL at 06:15

## 2018-03-18 RX ADMIN — Medication 40 MILLIGRAM(S): at 06:15

## 2018-03-18 RX ADMIN — PENICILLIN G POTASSIUM 100 MILLION UNIT(S): 5000000 POWDER, FOR SOLUTION INTRAMUSCULAR; INTRAPLEURAL; INTRATHECAL; INTRAVENOUS at 22:09

## 2018-03-18 RX ADMIN — LEVETIRACETAM 1500 MILLIGRAM(S): 250 TABLET, FILM COATED ORAL at 06:14

## 2018-03-18 RX ADMIN — Medication 27.5 MILLIGRAM(S): at 22:09

## 2018-03-18 RX ADMIN — INSULIN GLARGINE 40 UNIT(S): 100 INJECTION, SOLUTION SUBCUTANEOUS at 06:14

## 2018-03-18 RX ADMIN — CARVEDILOL PHOSPHATE 6.25 MILLIGRAM(S): 80 CAPSULE, EXTENDED RELEASE ORAL at 06:13

## 2018-03-18 RX ADMIN — ISOSORBIDE DINITRATE 10 MILLIGRAM(S): 5 TABLET ORAL at 22:08

## 2018-03-18 RX ADMIN — PENICILLIN G POTASSIUM 100 MILLION UNIT(S): 5000000 POWDER, FOR SOLUTION INTRAMUSCULAR; INTRAPLEURAL; INTRATHECAL; INTRAVENOUS at 09:14

## 2018-03-18 RX ADMIN — PENICILLIN G POTASSIUM 100 MILLION UNIT(S): 5000000 POWDER, FOR SOLUTION INTRAMUSCULAR; INTRAPLEURAL; INTRATHECAL; INTRAVENOUS at 14:51

## 2018-03-18 RX ADMIN — Medication 27.5 MILLIGRAM(S): at 13:13

## 2018-03-18 RX ADMIN — Medication 2: at 06:27

## 2018-03-18 NOTE — PROGRESS NOTE ADULT - SUBJECTIVE AND OBJECTIVE BOX
[facility]                                     Neurology at [place]                                 [facility]                                  [address]                                        [place], [place], [address]                                             [phone]      Vital signs:  T(C): 37.3 (03-18-18 @ 16:00), Max: 37.9 (03-18-18 @ 08:00)  HR: 87 (03-18-18 @ 16:00) (79 - 108)  BP: 119/68 (03-18-18 @ 16:00) (112/66 - 152/81)  RR: 23 (03-18-18 @ 16:00) (13 - 27)  SpO2: 95% (03-18-18 @ 16:00) (94% - 98%)  Wt(kg): --    Exam:    less responsive today  overnight PLEDs diminished  perrl  face sym  no purposeful movement  no grimace to pain

## 2018-03-18 NOTE — PROGRESS NOTE ADULT - ATTENDING COMMENTS
Updated pt's son at bedside today, pt's 2 brothers visiting from [place]. Approached the notion that he has not improved, we are trying to support him but he is still deteriorating and may not survive this hospitalization. They expressed understanding, plan of care reviewed. Palliative consult requested.

## 2018-03-18 NOTE — PROGRESS NOTE ADULT - PROBLEM SELECTOR PLAN 1
Etiology unclear.   EEG with PLEDs, somewhat improving after addition of Depacon  pt remains encephalopathic

## 2018-03-18 NOTE — EEG REPORT - NS EEG TEXT BOX
[facility]  Report of Continuous Video EEG    [facility]: [address], [place] [address], Ph#: [phone]  [facility]: [id number] [place] [address], [place] [address], Ph#: [phone]  Office: [address] [facility] [facility] [address] [facility] [address], [place] [address] Ph#: [phone]    Patient Name: Reynaldo Clemens    Age: 73 y, : 1945  Patient ID: -, MRN #: -, Traore: -  Referring Physician: Vickie Gunderson  EEG #: [id number]    Study Date: 3/17/2018    Study Information:    EEG Recording Technique:  The patient underwent continuous Video-EEG monitoring, using Telemetry System hardware on the XLTek Digital System. EEG and video data were stored on a computer hard drive with important events saved in digital archive files. The material was reviewed by a physician (electroencephalographer / epileptologist) on a daily basis. Antonio and seizure detection algorithms were utilized and reviewed. An EEG Technician attended to the patient, and was available throughout daytime work hours.  The epilepsy center neurologist was available in person or on call 24-hours per day.    EEG Placement and Labeling of Electrodes:  The EEG was performed utilizing 20 channel referential EEG connections (coronal over temporal over parasagittal montage) using all standard 10-20 electrode placements with EKG, with additional electrodes placed in the inferior temporal region using the modified 10-10 montage electrode placements for elective admissions, or if deemed necessary. Recording was at a sampling rate of 256 samples per second per channel. Time synchronized digital video recording was done simultaneously with EEG recording. A low light infrared camera was used for low light recording.     History: Seizures  -  -    Medication	  Insulin	  Keppra	  -	  	  Ativan	    Study Interpretation:    FINDINGS:  The background consists of diffuse low amplitude 10-20 UV mixed frequency alpha and beta activity over the left hemisphere admixed beta. Lower amplitude alpha/beta seen over the right hemisphere with voltage suppression over the right anterior-mid temporal region.      Approximately 1 Hz right hemisphere PLEDS were seen continuously.  Compared to last 24 hours, Pleds seen posteriorly in the right temporal and parieto-occipital region are less continuous..    Background Slowing:  Mild generalized slowing .    Focal Slowing:   Right hemisphere with a temporal predominance.    Sleep Background:  Normal sleep transients not recorded.    Other Paroxysmal Non-Epileptiform Findings:  None were present.    Interictal Epileptiform Activity:   Right  centro-temporal PLEDs     Events:  Clinical events: None recorded.  Seizures: None recorded.    Artifacts:  Intermittent myogenic and movement artifacts were noted.      EEG Impression:  Abnormal VEEG due to the presence of:  1.	Mild generalized slowing   2.	right anterior temporal slowing and voltage suppression.  3.	right hemisphere PLEDs maximal in the parieto-temporal region are less continuous than in the prior 24 hours  4.	No clinical seizures    Clinical Correlation:  The study is consistent with a resolving toxic-metabolic encephalopathy and some improvement in the continuity of right hemisphere PLEDS.    Lb Reyes MD  Director of Neurology, [facility]  Attending Epileptologist and , [facility]

## 2018-03-18 NOTE — PROGRESS NOTE ADULT - SUBJECTIVE AND OBJECTIVE BOX
[facility]  INFECTIOUS DISEASES AND INTERNAL MEDICINE at [place]  =======================================================  Basilio Hoskins MD [facility] HospitalRADHA Zavala MD  Diplomates American Board of Internal Medicine and Infectious Diseases  =======================================================    JEFF KATELYNN [id number]  follow up on lethargy  patient seen and examined in follow up.  Chart and labs reviewed.   Cultures negative  was more awake and drinking water yesterday, today lethargic withdrawing to noxious stimuli only    =======================================================  Allergies:  No Known Allergies    =======================================================  MEDICATIONS  (STANDING):  aspirin  chewable 81 milliGRAM(s) Oral daily  atorvastatin 20 milliGRAM(s) Oral at bedtime  carvedilol 6.25 milliGRAM(s) Oral every 12 hours  dextrose 5%. 1000 milliLiter(s) (50 mL/Hr) IV Continuous <Continuous>  dextrose 50% Injectable 12.5 Gram(s) IV Push once  dextrose 50% Injectable 25 Gram(s) IV Push once  dextrose 50% Injectable 25 Gram(s) IV Push once  finasteride 5 milliGRAM(s) Oral daily  furosemide   Injectable 40 milliGRAM(s) IV Push daily  heparin  Injectable 5000 Unit(s) SubCutaneous every 12 hours  insulin glargine Injectable (LANTUS) 40 Unit(s) SubCutaneous two times a day  insulin lispro (HumaLOG) corrective regimen sliding scale   SubCutaneous Before meals and at bedtime  isosorbide   dinitrate Tablet (ISORDIL) 10 milliGRAM(s) Oral three times a day  levETIRAcetam  Solution 1500 milliGRAM(s) Oral two times a day  pantoprazole  Injectable 40 milliGRAM(s) IV Push two times a day  penicillin   G  potassium  IVPB      penicillin   G  potassium  IVPB 3 Million Unit(s) IV Intermittent every 4 hours  valproate sodium IVPB 500 milliGRAM(s) IV Intermittent every 8 hours       =======================================================     REVIEW OF SYSTEMS:  unable to obtain  =======================================================    Physical Exam:  Vital Signs Last 24 Hrs  T(C): 37.7 (18 Mar 2018 12:00), Max: 37.9 (18 Mar 2018 08:00)  T(F): 99.9 (18 Mar 2018 12:00), Max: 100.2 (18 Mar 2018 08:00)  HR: 82 (18 Mar 2018 13:00) (82 - 108)  BP: 116/60 (18 Mar 2018 13:00) (112/66 - 175/74)  BP(mean): 81 (18 Mar 2018 13:00) (80 - 130)  RR: 13 (18 Mar 2018 13:00) (13 - 27)  SpO2: 96% (18 Mar 2018 13:00) (94% - 98%)    GEN: NAD,    HEENT: normocephalic and atraumatic. EOMI.    NECK: Supple.   LUNGS: Clear to auscultation.  HEART: Regular rate and rhythm without murmur.  ABDOMEN: Soft, nontender, and nondistended.  Positive bowel sounds.    : + christopher in place  EXTREMITIES: Without any cyanosis, clubbing, rash, lesions or edema.  MSK: no joint swelling  NEUROLOGIC: Cranial nerves II through XII are grossly intact.   SKIN: No ulceration or induration present.    =======================================================      Labs:      147<H>  |  101  |  43.0<H>  ----------------------------<  105  4.3   |  30.0<H>  |  2.18<H>    Ca    9.0      18 Mar 2018 05:45  Phos  3.2       Mg     2.2                                 17.5   14.6  )-----------( 244      ( 18 Mar 2018 05:45 )             53.2         Urinalysis Basic - ( 17 Mar 2018 10:20 )    Color: Yellow / Appearance: Clear / S.010 / pH: x  Gluc: x / Ketone: Negative  / Bili: Negative / Urobili: Negative mg/dL   Blood: x / Protein: 100 mg/dL / Nitrite: Negative   Leuk Esterase: Negative / RBC: 6-10 /HPF / WBC 0-2   Sq Epi: x / Non Sq Epi: Few / Bacteria: Occasional           RECENT CULTURES:  03-10 @ 10:29 .CSF CSF     No growth at 3 days.    No White blood cells  No organisms seen      03-10 @ 10:05 .Blood Blood-Peripheral     No growth at 5 days.       03-10 @ 10:04 .Blood Blood-Peripheral     No growth at 5 days.

## 2018-03-18 NOTE — PROGRESS NOTE ADULT - PROBLEM SELECTOR PLAN 1
Not clear if this is neurosyphilis,   continue trial of IV penicillin    3 million units every 4 hours (75% percent of normal dose for renal dysfunction)

## 2018-03-18 NOTE — PROGRESS NOTE ADULT - PROBLEM SELECTOR PLAN 3
as above  Unclear etiology  Increased Keppra, added Depacon  Depacon level therapeutic   24h EEG- try to suppress PLEDs - which appear to be less frequent on tracing.  neuro following- d/w Dr. Lara

## 2018-03-18 NOTE — PROGRESS NOTE ADULT - SUBJECTIVE AND OBJECTIVE BOX
Patient is a 73y old  Male who presents with a chief complaint of Unresponsive (10 Mar 2018 05:56)      BRIEF HOSPITAL COURSE: 72 y/o M with a h/o DM, CHF, HTN, CKD, shingles over the past 3 months, who was BIBA after being found unresponsive by wife at home, was hypertensive and twitching on arrival intubated for airway protection. Later developed tonic-clonic seizure activity. Concern for meningoencephalitis, however, LP unremarkable. Extubated on 3/11. Remains encephalopathic    Events last 24 hours: hypoglycemic once last evening, TF started, less responsive, EEG continues w PLEDs    PAST MEDICAL & SURGICAL HISTORY:  CHF (congestive heart failure)  HTN (hypertension)  Diabetes  No significant past surgical history      Review of Systems:  Cannot be obtained pt is encephalopathic       Medications:  penicillin   G  potassium  IVPB      penicillin   G  potassium  IVPB 3 Million Unit(s) IV Intermittent every 4 hours    carvedilol 6.25 milliGRAM(s) Oral every 12 hours  furosemide   Injectable 40 milliGRAM(s) IV Push daily  isosorbide   dinitrate Tablet (ISORDIL) 10 milliGRAM(s) Oral three times a day    ALBUTerol    90 MICROgram(s) HFA Inhaler 2 Puff(s) Inhalation every 6 hours PRN    levETIRAcetam  Solution 1500 milliGRAM(s) Oral two times a day  valproate sodium IVPB 500 milliGRAM(s) IV Intermittent every 8 hours      aspirin  chewable 81 milliGRAM(s) Oral daily  heparin  Injectable 5000 Unit(s) SubCutaneous every 12 hours    pantoprazole  Injectable 40 milliGRAM(s) IV Push two times a day      atorvastatin 20 milliGRAM(s) Oral at bedtime  dextrose 50% Injectable 12.5 Gram(s) IV Push once  dextrose 50% Injectable 25 Gram(s) IV Push once  dextrose 50% Injectable 25 Gram(s) IV Push once  dextrose Gel 1 Dose(s) Oral once PRN  finasteride 5 milliGRAM(s) Oral daily  glucagon  Injectable 1 milliGRAM(s) IntraMuscular once PRN  insulin glargine Injectable (LANTUS) 40 Unit(s) SubCutaneous two times a day  insulin lispro (HumaLOG) corrective regimen sliding scale   SubCutaneous Before meals and at bedtime    dextrose 5%. 1000 milliLiter(s) IV Continuous <Continuous>                ICU Vital Signs Last 24 Hrs  T(C): 37.7 (18 Mar 2018 12:00), Max: 37.9 (18 Mar 2018 08:00)  T(F): 99.9 (18 Mar 2018 12:00), Max: 100.2 (18 Mar 2018 08:00)  HR: 90 (18 Mar 2018 12:00) (85 - 108)  BP: 114/68 (18 Mar 2018 12:00) (112/66 - 175/74)  BP(mean): 86 (18 Mar 2018 12:00) (80 - 130)  ABP: --  ABP(mean): --  RR: 20 (18 Mar 2018 12:00) (16 - 27)  SpO2: 96% (18 Mar 2018 12:00) (94% - 98%)          I&O's Detail    17 Mar 2018 07:01  -  18 Mar 2018 07:00  --------------------------------------------------------  IN:    Free Water: 400 mL    Glucerna: 420 mL    Solution: 500 mL    Solution: 125 mL  Total IN: 1445 mL    OUT:    Indwelling Catheter - Urethral: 1795 mL  Total OUT: 1795 mL    Total NET: -350 mL      18 Mar 2018 07:01  -  18 Mar 2018 13:07  --------------------------------------------------------  IN:    Free Water: 200 mL    Glucerna: 300 mL    Solution: 25 mL    Solution: 100 mL  Total IN: 625 mL    OUT:    Indwelling Catheter - Urethral: 450 mL  Total OUT: 450 mL    Total NET: 175 mL            LABS:                        17.5   14.6  )-----------( 244      ( 18 Mar 2018 05:45 )             53.2     03-18    147<H>  |  101  |  43.0<H>  ----------------------------<  105  4.3   |  30.0<H>  |  2.18<H>    Ca    9.0      18 Mar 2018 05:45  Phos  3.2     03-18  Mg     2.2     03-18            CAPILLARY BLOOD GLUCOSE  125 (17 Mar 2018 22:00)      POCT Blood Glucose.: 128 mg/dL (18 Mar 2018 11:21)      Urinalysis Basic - ( 17 Mar 2018 10:20 )    Color: Yellow / Appearance: Clear / S.010 / pH: x  Gluc: x / Ketone: Negative  / Bili: Negative / Urobili: Negative mg/dL   Blood: x / Protein: 100 mg/dL / Nitrite: Negative   Leuk Esterase: Negative / RBC: 6-10 /HPF / WBC 0-2   Sq Epi: x / Non Sq Epi: Few / Bacteria: Occasional      CULTURES:      Physical Examination:    General: No acute distress.      HEENT: Pupils equal, reactive to light.  Symmetric. NGT in place    PULM: Clear to auscultation bilaterally, no significant sputum production    CVS: Regular rate and rhythm, no murmurs, rubs, or gallops    ABD: Soft, nondistended, nontender, normoactive bowel sounds, no masses    EXT: 1+ edema, nontender    SKIN: Warm and well perfused, stasis changes     NEURO: grimaces, withdraws from noxious stim    RADIOLOGY:   < from: Xray Chest 1 View- PORTABLE-Urgent (18 @ 18:09) >  FINDINGS:    The lungs  are clear.  No pleural abnormality is seen.       NG tube tip beyond GE junction.  The  heart is enlarged in transverse diameter. No hilar mass. Trachea   midline.  Cardiac device wire leads are within right atrium and right   ventricle.    Visualized osseous structures are intact.        IMPRESSION: NG tube tip beyond GE junction .                PATRICIO MORA M.D., ATTENDING RADIOLOGIST    < end of copied text >    CRITICAL CARE TIME SPENT: 60

## 2018-03-18 NOTE — CHART NOTE - NSCHARTNOTEFT_GEN_A_CORE
Source: Patient [ ]  Family [ ]   other [X] EMR    Current Diet: NPO with enteral feeds.  Pt s/p bedside swallow evaluation 3/15 -> recommends puree with thin liquid diet however pt has become too lethargic for po diet due to encephalopathy and is now receiving enteral feeds.    Enteral /Parenteral Nutrition: Glucerna 1.5cal at 60ml/hr via NGT.    Current Weight: (3/18) 125.3kg  (3/9) 122.4kg    Pertinent Medications: MEDICATIONS  (STANDING):  aspirin  chewable 81 milliGRAM(s) Oral daily  atorvastatin 20 milliGRAM(s) Oral at bedtime  carvedilol 6.25 milliGRAM(s) Oral every 12 hours  dextrose 5%. 1000 milliLiter(s) (50 mL/Hr) IV Continuous <Continuous>  dextrose 50% Injectable 12.5 Gram(s) IV Push once  dextrose 50% Injectable 25 Gram(s) IV Push once  dextrose 50% Injectable 25 Gram(s) IV Push once  finasteride 5 milliGRAM(s) Oral daily  furosemide   Injectable 40 milliGRAM(s) IV Push daily  heparin  Injectable 5000 Unit(s) SubCutaneous every 12 hours  insulin glargine Injectable (LANTUS) 40 Unit(s) SubCutaneous two times a day  insulin lispro (HumaLOG) corrective regimen sliding scale   SubCutaneous Before meals and at bedtime  isosorbide   dinitrate Tablet (ISORDIL) 10 milliGRAM(s) Oral three times a day  levETIRAcetam  Solution 1500 milliGRAM(s) Oral two times a day  pantoprazole  Injectable 40 milliGRAM(s) IV Push two times a day  penicillin   G  potassium  IVPB      penicillin   G  potassium  IVPB 3 Million Unit(s) IV Intermittent every 4 hours  valproate sodium IVPB 500 milliGRAM(s) IV Intermittent every 8 hours    MEDICATIONS  (PRN):  ALBUTerol    90 MICROgram(s) HFA Inhaler 2 Puff(s) Inhalation every 6 hours PRN Shortness of Breath  dextrose Gel 1 Dose(s) Oral once PRN Blood Glucose LESS THAN 70 milliGRAM(s)/deciLiter  glucagon  Injectable 1 milliGRAM(s) IntraMuscular once PRN Glucose <70 milliGRAM(s)/deciLiter    Pertinent Labs: CBC Full  -  ( 18 Mar 2018 05:45 )  WBC Count : 14.6 K/uL  Hemoglobin : 17.5 g/dL  Hematocrit : 53.2 %  Na 147, BUN 43, Cr 2.18  Glucose POCT 174  (3/10) HgbA1c 8.3%    Skin: No breakdown noted.    Nutrition focused physical exam NOT conducted - found signs of malnutrition [ ]absent [ ]present    Subcutaneous fat loss: [ ] Orbital fat pads region, [ ]Buccal fat region, [ ]Triceps region,  [ ]Ribs region    Muscle wasting: [ ]Temples region, [ ]Clavicle region, [ ]Shoulder region, [ ]Scapula region, [ ]Interosseous region,  [ ]thigh region, [ ]Calf region    Estimated Needs:   [X ] no change since previous assessment: Estimated nutritional needs: 9369-3079 kcal and 65-86 grams protein.  [ ] recalculated:     Current Nutrition Diagnosis: Pt at high nutritional risk secondary to difficulty chewing/swallowing related to encepholapathy as evidenced by pt with increased lethargy, unable to tolerate po diet and is currently receiving enteral feeds via NGT. Pt currently tolerating Glucerna 1.5cal at 60ml/hr well, meeting estimated nutritional needs.    Recommendations: Continue Glucerna 1.5cal at 60ml/hr x 20 hours will provide 1800kcal and 99 grams protein daily.    Monitoring and Evaluation:   [ ] PO intake [X ] Tolerance to enteral feeds. [X] Weights  [X] Follow up per protocol [X] Labs: renal labs and glucose.

## 2018-03-19 DIAGNOSIS — R09.02 HYPOXEMIA: ICD-10-CM

## 2018-03-19 LAB
GLUCOSE BLDC GLUCOMTR-MCNC: 100 MG/DL — HIGH (ref 70–99)
GLUCOSE BLDC GLUCOMTR-MCNC: 133 MG/DL — HIGH (ref 70–99)
GLUCOSE BLDC GLUCOMTR-MCNC: 202 MG/DL — HIGH (ref 70–99)
GLUCOSE BLDC GLUCOMTR-MCNC: 205 MG/DL — HIGH (ref 70–99)
GLUCOSE BLDC GLUCOMTR-MCNC: 95 MG/DL — SIGNIFICANT CHANGE UP (ref 70–99)
HCT VFR BLD CALC: 43 % — SIGNIFICANT CHANGE UP (ref 42–52)
HGB BLD-MCNC: 14 G/DL — SIGNIFICANT CHANGE UP (ref 14–18)
MCHC RBC-ENTMCNC: 28.7 PG — SIGNIFICANT CHANGE UP (ref 27–31)
MCHC RBC-ENTMCNC: 32.6 G/DL — SIGNIFICANT CHANGE UP (ref 32–36)
MCV RBC AUTO: 88.3 FL — SIGNIFICANT CHANGE UP (ref 80–94)
PLATELET # BLD AUTO: 335 K/UL — SIGNIFICANT CHANGE UP (ref 150–400)
RBC # BLD: 4.87 M/UL — SIGNIFICANT CHANGE UP (ref 4.6–6.2)
RBC # FLD: 15.4 % — SIGNIFICANT CHANGE UP (ref 11–15.6)
WBC # BLD: 13.2 K/UL — HIGH (ref 4.8–10.8)
WBC # FLD AUTO: 13.2 K/UL — HIGH (ref 4.8–10.8)

## 2018-03-19 PROCEDURE — 99233 SBSQ HOSP IP/OBS HIGH 50: CPT

## 2018-03-19 PROCEDURE — 99231 SBSQ HOSP IP/OBS SF/LOW 25: CPT

## 2018-03-19 PROCEDURE — 74018 RADEX ABDOMEN 1 VIEW: CPT | Mod: 26

## 2018-03-19 PROCEDURE — 95951: CPT | Mod: 26

## 2018-03-19 PROCEDURE — 70450 CT HEAD/BRAIN W/O DYE: CPT | Mod: 26

## 2018-03-19 PROCEDURE — 99291 CRITICAL CARE FIRST HOUR: CPT

## 2018-03-19 RX ORDER — LACTULOSE 10 G/15ML
30 SOLUTION ORAL ONCE
Qty: 0 | Refills: 0 | Status: COMPLETED | OUTPATIENT
Start: 2018-03-19 | End: 2018-03-19

## 2018-03-19 RX ADMIN — Medication 27.5 MILLIGRAM(S): at 06:29

## 2018-03-19 RX ADMIN — ATORVASTATIN CALCIUM 20 MILLIGRAM(S): 80 TABLET, FILM COATED ORAL at 23:01

## 2018-03-19 RX ADMIN — Medication 81 MILLIGRAM(S): at 13:31

## 2018-03-19 RX ADMIN — ISOSORBIDE DINITRATE 10 MILLIGRAM(S): 5 TABLET ORAL at 06:31

## 2018-03-19 RX ADMIN — INSULIN GLARGINE 40 UNIT(S): 100 INJECTION, SOLUTION SUBCUTANEOUS at 23:01

## 2018-03-19 RX ADMIN — PENICILLIN G POTASSIUM 100 MILLION UNIT(S): 5000000 POWDER, FOR SOLUTION INTRAMUSCULAR; INTRAPLEURAL; INTRATHECAL; INTRAVENOUS at 13:31

## 2018-03-19 RX ADMIN — ISOSORBIDE DINITRATE 10 MILLIGRAM(S): 5 TABLET ORAL at 22:57

## 2018-03-19 RX ADMIN — PENICILLIN G POTASSIUM 100 MILLION UNIT(S): 5000000 POWDER, FOR SOLUTION INTRAMUSCULAR; INTRAPLEURAL; INTRATHECAL; INTRAVENOUS at 06:29

## 2018-03-19 RX ADMIN — LEVETIRACETAM 1500 MILLIGRAM(S): 250 TABLET, FILM COATED ORAL at 06:30

## 2018-03-19 RX ADMIN — HEPARIN SODIUM 5000 UNIT(S): 5000 INJECTION INTRAVENOUS; SUBCUTANEOUS at 06:30

## 2018-03-19 RX ADMIN — PANTOPRAZOLE SODIUM 40 MILLIGRAM(S): 20 TABLET, DELAYED RELEASE ORAL at 06:30

## 2018-03-19 RX ADMIN — Medication 27.5 MILLIGRAM(S): at 22:57

## 2018-03-19 RX ADMIN — PENICILLIN G POTASSIUM 100 MILLION UNIT(S): 5000000 POWDER, FOR SOLUTION INTRAMUSCULAR; INTRAPLEURAL; INTRATHECAL; INTRAVENOUS at 17:32

## 2018-03-19 RX ADMIN — Medication 27.5 MILLIGRAM(S): at 13:32

## 2018-03-19 RX ADMIN — HEPARIN SODIUM 5000 UNIT(S): 5000 INJECTION INTRAVENOUS; SUBCUTANEOUS at 17:32

## 2018-03-19 RX ADMIN — LACTULOSE 30 GRAM(S): 10 SOLUTION ORAL at 14:21

## 2018-03-19 RX ADMIN — ISOSORBIDE DINITRATE 10 MILLIGRAM(S): 5 TABLET ORAL at 13:31

## 2018-03-19 RX ADMIN — PENICILLIN G POTASSIUM 100 MILLION UNIT(S): 5000000 POWDER, FOR SOLUTION INTRAMUSCULAR; INTRAPLEURAL; INTRATHECAL; INTRAVENOUS at 02:25

## 2018-03-19 RX ADMIN — CARVEDILOL PHOSPHATE 6.25 MILLIGRAM(S): 80 CAPSULE, EXTENDED RELEASE ORAL at 07:20

## 2018-03-19 RX ADMIN — INSULIN GLARGINE 40 UNIT(S): 100 INJECTION, SOLUTION SUBCUTANEOUS at 08:33

## 2018-03-19 RX ADMIN — PENICILLIN G POTASSIUM 100 MILLION UNIT(S): 5000000 POWDER, FOR SOLUTION INTRAMUSCULAR; INTRAPLEURAL; INTRATHECAL; INTRAVENOUS at 22:57

## 2018-03-19 RX ADMIN — PENICILLIN G POTASSIUM 100 MILLION UNIT(S): 5000000 POWDER, FOR SOLUTION INTRAMUSCULAR; INTRAPLEURAL; INTRATHECAL; INTRAVENOUS at 09:44

## 2018-03-19 RX ADMIN — LEVETIRACETAM 1500 MILLIGRAM(S): 250 TABLET, FILM COATED ORAL at 17:32

## 2018-03-19 RX ADMIN — Medication 4: at 06:32

## 2018-03-19 RX ADMIN — CARVEDILOL PHOSPHATE 6.25 MILLIGRAM(S): 80 CAPSULE, EXTENDED RELEASE ORAL at 17:32

## 2018-03-19 RX ADMIN — Medication 40 MILLIGRAM(S): at 06:30

## 2018-03-19 RX ADMIN — FINASTERIDE 5 MILLIGRAM(S): 5 TABLET, FILM COATED ORAL at 13:31

## 2018-03-19 NOTE — PROGRESS NOTE ADULT - SUBJECTIVE AND OBJECTIVE BOX
Patient is a 73y old  Male who presents with a chief complaint of Unresponsive (10 Mar 2018 05:56)      BRIEF HOSPITAL COURSE: 74 yo male, PMHx DM, HF, HTN, who presented BIBA after being found unresponsive by wife at home, intubated for hypoxemic respiratory failure, r/o acute CVA vs encephalopathy post-ictal state secondary to seizures. Concerns for encephalitis secondary to VZV meningitis in differential, which was negative, Syphilis equivocal.     Events last 24 hours: Persistently encephalopathic, had a decline in mental status,  PLEDS on EEG.  This am pt with increased secretions, possible aspiration.  Requiring NT suctioning this am, not clearing secretions well.  Keep upright, chest PT, NT suctioning as needed.  Monitor oxygenation, increased fi02 requiring this am.      PAST MEDICAL & SURGICAL HISTORY:  CHF (congestive heart failure)  HTN (hypertension)  Diabetes  No significant past surgical history      Review of Systems: unable to obtain, patient encephalopathic      Medications:  penicillin   G  potassium  IVPB      penicillin   G  potassium  IVPB 3 Million Unit(s) IV Intermittent every 4 hours  carvedilol 6.25 milliGRAM(s) Oral every 12 hours  furosemide   Injectable 40 milliGRAM(s) IV Push daily  isosorbide   dinitrate Tablet (ISORDIL) 10 milliGRAM(s) Oral three times a day  ALBUTerol    90 MICROgram(s) HFA Inhaler 2 Puff(s) Inhalation every 6 hours PRN  levETIRAcetam  Solution 1500 milliGRAM(s) Oral two times a day  valproate sodium IVPB 500 milliGRAM(s) IV Intermittent every 8 hours  aspirin  chewable 81 milliGRAM(s) Oral daily  heparin  Injectable 5000 Unit(s) SubCutaneous every 12 hours  pantoprazole  Injectable 40 milliGRAM(s) IV Push two times a day  atorvastatin 20 milliGRAM(s) Oral at bedtime  dextrose 50% Injectable 12.5 Gram(s) IV Push once  dextrose 50% Injectable 25 Gram(s) IV Push once  dextrose 50% Injectable 25 Gram(s) IV Push once  dextrose Gel 1 Dose(s) Oral once PRN  finasteride 5 milliGRAM(s) Oral daily  glucagon  Injectable 1 milliGRAM(s) IntraMuscular once PRN  insulin glargine Injectable (LANTUS) 40 Unit(s) SubCutaneous two times a day  insulin lispro (HumaLOG) corrective regimen sliding scale   SubCutaneous Before meals and at bedtime  dextrose 5%. 1000 milliLiter(s) IV Continuous <Continuous>      ICU Vital Signs Last 24 Hrs  T(C): 37.7 (17 Mar 2018 16:00), Max: 38.4 (17 Mar 2018 10:00)  T(F): 99.9 (17 Mar 2018 16:00), Max: 101.1 (17 Mar 2018 10:00)  HR: 106 (17 Mar 2018 18:00) (102 - 124)  BP: 148/71 (17 Mar 2018 18:00) (125/80 - 198/94)  BP(mean): 102 (17 Mar 2018 18:00) (95 - 136)  ABP: --  ABP(mean): --  RR: 18 (17 Mar 2018 18:00) (17 - 28)  SpO2: 98% (17 Mar 2018 18:00) (96% - 98%)          I&O's Detail    16 Mar 2018 07:01  -  17 Mar 2018 07:00  --------------------------------------------------------  IN:    Enteral Tube Flush: 100 mL    Oral Fluid: 480 mL    Solution: 50 mL    Solution: 300 mL    Solution: 50 mL    Solution: 200 mL  Total IN: 1180 mL    OUT:    Indwelling Catheter - Urethral: 2145 mL  Total OUT: 2145 mL    Total NET: -965 mL      17 Mar 2018 07:01  -  17 Mar 2018 23:10  --------------------------------------------------------  IN:    Free Water: 200 mL    Glucerna: 40 mL    Solution: 300 mL    Solution: 100 mL  Total IN: 640 mL    OUT:    Indwelling Catheter - Urethral: 845 mL  Total OUT: 845 mL    Total NET: -205 mL            LABS:                        15.9   11.9  )-----------( 258      ( 17 Mar 2018 05:15 )             48.4     03-17    142  |  100  |  44.0<H>  ----------------------------<  132<H>  4.3   |  25.0  |  2.05<H>    Ca    9.0      17 Mar 2018 05:15  Phos  3.1     03-17  Mg     2.2     03-17            CAPILLARY BLOOD GLUCOSE  115 (16 Mar 2018 22:00)      POCT Blood Glucose.: 125 mg/dL (17 Mar 2018 22:18)      Urinalysis Basic - ( 17 Mar 2018 10:20 )    Color: Yellow / Appearance: Clear / S.010 / pH: x  Gluc: x / Ketone: Negative  / Bili: Negative / Urobili: Negative mg/dL   Blood: x / Protein: 100 mg/dL / Nitrite: Negative   Leuk Esterase: Negative / RBC: 6-10 /HPF / WBC 0-2   Sq Epi: x / Non Sq Epi: Few / Bacteria: Occasional      CULTURES:      Physical Examination:    General: Obese male lying in bed increased secretions this am    HEENT: Pupils equal, reactive to light.  Symmetric.    PULM: Course b/l, no significant sputum production    CVS: Sinus tachycardia, no murmurs, rubs, or gallops    ABD: Soft, nondistended, nontender, normoactive bowel sounds, no masses    EXT: Generalized anasarca, nontender    SKIN: Warm and well perfused, no rashes noted.    NEURO: moves right better than left    RADIOLOGY: old images reviewed    EEG:  EEG Impression:  Abnormal VEEG due to the presence of:  [id number].	Mild generalized slowing   -[id number].	Improvement in background compared with 3/11 veeg. Still with voltage suppression over right anterior temporal region.  -[id number].	Re-appearance of right hemisphere PLEDs although more posteriorly located in the parieto-temporal region.  -[id number].	No clinical seizures    Clinical Correlation:  The study is consistent with a resolving toxic-metabolic encephalopathy and re-emergence of right hemisphere PLEDS in a more posterior location. Correlation with imaging recommended.      Lb Reyes MD  Director of Neurology, [facility]  Attending Epileptologist and , [facility]          Electronic Signatures:  Lb Reyes)  (Signed 17-Mar-2018 07:52)    CRITICAL CARE TIME SPENT: I have spent 45 minutes of critical care time evaluating and treating this patient, including reviewing charts, labs, imagining studies, and collaborating with interdisciplinary team.

## 2018-03-19 NOTE — PROGRESS NOTE ADULT - PROBLEM SELECTOR PLAN 6
Increased fi02 requirment today  Possibly aspirating  NT suctioning as needed  Keep HOB elevated  Chest PT

## 2018-03-19 NOTE — PROGRESS NOTE ADULT - PROBLEM SELECTOR PLAN 1
Toxic metabolic encephalopathy of unclear etiology, PLEDS on EEG  On PCN for neurosyphilis as possible etiology  Continue to monitor serial neuro exams  Repeat head CT today  HOB > 30 deg for aspiration precautions

## 2018-03-19 NOTE — PROGRESS NOTE ADULT - SUBJECTIVE AND OBJECTIVE BOX
[facility]                                        [facility] [place]                                 Marco Cox, & Austyn                                  [address]                                        [place], [place], [address]                                             [phone]        No new complaints.    Vital Signs Last 24 Hrs  T(F): 100 (19 Mar 2018 16:08), Max: 100.9 (19 Mar 2018 12:00)  HR: 93 (19 Mar 2018 16:00) (86 - 104)  BP: 113/65 (19 Mar 2018 16:00) (109/51 - 165/76)  BP(mean): 84 (19 Mar 2018 16:00) (74 - 118)  RR: 16 (19 Mar 2018 16:00) (15 - 34)  SpO2: 98% (19 Mar 2018 16:00) (93% - 98%)    Remains poorly responsive.   Opens eyes to voice but not following instructions  Pupils react.  Face symmetric.  Some spontaneous movement of upper extremities.

## 2018-03-19 NOTE — PROGRESS NOTE ADULT - SUBJECTIVE AND OBJECTIVE BOX
[facility]  INFECTIOUS DISEASES AND INTERNAL MEDICINE at [place]  =======================================================  Basilio Zavala MD  Diplomates American Board of Internal Medicine and Infectious Diseases  =======================================================    KATELYNN AGUILAR [id number]  follow up on lethargy  patient seen and examined in follow up.  Chart and labs reviewed.   Cultures negative  AWAKE BUT LETHARGIC    =======================================================  Allergies:  No Known Allergies    =======================================================  MEDICATIONS  (STANDING):  aspirin  chewable 81 milliGRAM(s) Oral daily  atorvastatin 20 milliGRAM(s) Oral at bedtime  carvedilol 6.25 milliGRAM(s) Oral every 12 hours  dextrose 5%. 1000 milliLiter(s) (50 mL/Hr) IV Continuous <Continuous>  dextrose 50% Injectable 12.5 Gram(s) IV Push once  dextrose 50% Injectable 25 Gram(s) IV Push once  dextrose 50% Injectable 25 Gram(s) IV Push once  finasteride 5 milliGRAM(s) Oral daily  furosemide   Injectable 40 milliGRAM(s) IV Push daily  heparin  Injectable 5000 Unit(s) SubCutaneous every 12 hours  insulin glargine Injectable (LANTUS) 40 Unit(s) SubCutaneous two times a day  insulin lispro (HumaLOG) corrective regimen sliding scale   SubCutaneous Before meals and at bedtime  isosorbide   dinitrate Tablet (ISORDIL) 10 milliGRAM(s) Oral three times a day  levETIRAcetam  Solution 1500 milliGRAM(s) Oral two times a day  pantoprazole  Injectable 40 milliGRAM(s) IV Push two times a day  penicillin   G  potassium  IVPB      penicillin   G  potassium  IVPB 3 Million Unit(s) IV Intermittent every 4 hours  valproate sodium IVPB 500 milliGRAM(s) IV Intermittent every 8 hours       =======================================================     REVIEW OF SYSTEMS:  unable to obtain  =======================================================    Physical Exam:  Vital Signs Last 24 Hrs  T(C): 38.3 (19 Mar 2018 12:00), Max: 38.3 (19 Mar 2018 12:00)  T(F): 100.9 (19 Mar 2018 12:00), Max: 100.9 (19 Mar 2018 12:00)  HR: 96 (19 Mar 2018 15:00) (86 - 104)  BP: 109/51 (19 Mar 2018 15:00) (109/51 - 165/76)  BP(mean): 74 (19 Mar 2018 15:00) (74 - 118)  RR: 20 (19 Mar 2018 15:00) (15 - 34)  SpO2: 98% (19 Mar 2018 15:00) (93% - 98%)    GEN: NAD, LETHARGIC    HEENT: normocephalic and atraumatic. EOMI.    NECK: Supple.   LUNGS: Clear to auscultation.  HEART: Regular rate and rhythm without murmur.  ABDOMEN: Soft, nontender, and nondistended.  Positive bowel sounds.    : + christopher in place  EXTREMITIES: Without any cyanosis, clubbing, rash, lesions or edema.  MSK: no joint swelling  NEUROLOGIC: LETHARGIC  SKIN: No ulceration or induration present.    =======================================================                             14.0   13.2  )-----------( 335      ( 19 Mar 2018 08:01 )             43.0   03-18    147<H>  |  101  |  43.0<H>  ----------------------------<  105  4.3   |  30.0<H>  |  2.18<H>    Ca    9.0      18 Mar 2018 05:45  Phos  3.2     -18  Mg     2.2     -18          Urinalysis Basic - ( 17 Mar 2018 10:20 )    Color: Yellow / Appearance: Clear / S.010 / pH: x  Gluc: x / Ketone: Negative  / Bili: Negative / Urobili: Negative mg/dL   Blood: x / Protein: 100 mg/dL / Nitrite: Negative   Leuk Esterase: Negative / RBC: 6-10 /HPF / WBC 0-2   Sq Epi: x / Non Sq Epi: Few / Bacteria: Occasional           RECENT CULTURES:  03-10 @ 10:29 .CSF CSF     No growth at 3 days.    No White blood cells  No organisms seen      03-10 @ 10:05 .Blood Blood-Peripheral     No growth at 5 days.       03-10 @ 10:04 .Blood Blood-Peripheral     No growth at 5 days.

## 2018-03-19 NOTE — EEG REPORT - NS EEG TEXT BOX
[facility]  Report of Continuous Video EEG    [facility]: [address], [place] [address], Ph#: [phone]  [facility]: [id number] [place] [address], [place] [address], Ph#: [phone]  Office: [address] [facility], [place] [address] Ph#: [phone]    Patient Name: Reynaldo Clemens    Age: 73 y, : 1945  Patient ID: -, MRN #: -, Traore: -  Referring Physician: Vickie Gunderson  EEG #: [id number]    Study Date: 3/17/2018    Study Information:    EEG Recording Technique:  The patient underwent continuous Video-EEG monitoring, using Telemetry System hardware on the XLTek Digital System. EEG and video data were stored on a computer hard drive with important events saved in digital archive files. The material was reviewed by a physician (electroencephalographer / epileptologist) on a daily basis. Antonio and seizure detection algorithms were utilized and reviewed. An EEG Technician attended to the patient, and was available throughout daytime work hours.  The epilepsy center neurologist was available in person or on call 24-hours per day.    EEG Placement and Labeling of Electrodes:  The EEG was performed utilizing 20 channel referential EEG connections (coronal over temporal over parasagittal montage) using all standard 10-20 electrode placements with EKG, with additional electrodes placed in the inferior temporal region using the modified 10-10 montage electrode placements for elective admissions, or if deemed necessary. Recording was at a sampling rate of 256 samples per second per channel. Time synchronized digital video recording was done simultaneously with EEG recording. A low light infrared camera was used for low light recording.     History: Seizures  -  -    Medication	  Insulin	  Keppra	  -	  	  Ativan	    Study Interpretation:    FINDINGS:  The background consists of diffuse low amplitude 10-20 UV mixed frequency alpha and beta activity over the left hemisphere admixed beta. Lower amplitude alpha/beta seen over the right hemisphere with voltage suppression over the right anterior-mid temporal region.      Approximately 1 Hz right hemisphere PLEDS were seen continuously.  Compared to last 24 hours, Pleds seen posteriorly in the right temporal and parieto-occipital region are less continuous..    Background Slowing:  Mild generalized slowing .    Focal Slowing:   Right hemisphere with a temporal predominance.    Sleep Background:  Normal sleep transients not recorded.    Other Paroxysmal Non-Epileptiform Findings:  None were present.    Interictal Epileptiform Activity:   Right  centro-temporal PLEDs     Events:  Clinical events: None recorded.  Seizures: None recorded.    Artifacts:  Intermittent myogenic and movement artifacts were noted.      EEG Impression:  Abnormal VEEG due to the presence of:  1.	Mild generalized slowing   2.	right anterior temporal slowing and voltage suppression.  3.	right hemisphere PLEDs maximal in the parieto-temporal region with continued reduction in frequency and amplitude of discharges over the past 24 hrs.  4.	No clinical seizures    Clinical Correlation:  The study is consistent with a resolving toxic-metabolic encephalopathy and continued  improvement in right hemisphere PLEDS.    Lb Reyes MD  Director of Neurology, [facility]  Attending Epileptologist and , [facility]

## 2018-03-19 NOTE — PROGRESS NOTE ADULT - ATTENDING COMMENTS
I have seen and evaluated the patient and agree with the above with the following additions:      < from: CT Head No Cont (03.19.18 @ 12:05) >     EXAM:  CT BRAIN                          PROCEDURE DATE:  03/19/2018          INTERPRETATION:  CLINICAL HISTORY: Seizures worsening mental status    COMPARISON: CT head dated 3/12/2018    TECHNIQUE: Noncontrast CT of the head. Multiplanar reformations are   submitted.    FINDINGS:  Limited study secondary to metallic streak artifact from the patient's   EEG leads. Acute/subacute moderate left PCA territory infarct involving   the left temporal lobe and left occipital lobe.    There is periventricular and subcortical white matter hypodensity without   mass effect, nonspecific, likely representing mild chronic microvascular   ischemic changes. There is no other evidence of mass, mass effect,   midline shift or extra-axial fluid collection. The lateral ventricles and   cortical sulci are otherwise age-appropriate in size and configuration.   The orbits, mastoid air cells and visualized paranasal sinuses are   unremarkable. The calvarium is intact. Right-sided approach NG tube.    IMPRESSION:       Acute/subacute moderate left PCA territory infarct   involving the left temporal lobe and left occipital lobe. Findings   discussed with ICU PA Nessa Wong.      RENEE KOCH M.D., ATTENDING RADIOLOGIST  This document has been electronically signed. Mar 19 2018 12:18PM    - New stroke, already on asa and statin, neurology following  - continue EEG  - updated wife and son, palliative care consult

## 2018-03-20 DIAGNOSIS — I63.9 CEREBRAL INFARCTION, UNSPECIFIED: ICD-10-CM

## 2018-03-20 DIAGNOSIS — Z51.5 ENCOUNTER FOR PALLIATIVE CARE: ICD-10-CM

## 2018-03-20 DIAGNOSIS — R09.3 ABNORMAL SPUTUM: ICD-10-CM

## 2018-03-20 LAB
ANION GAP SERPL CALC-SCNC: 13 MMOL/L — SIGNIFICANT CHANGE UP (ref 5–17)
BUN SERPL-MCNC: 58 MG/DL — HIGH (ref 8–20)
CALCIUM SERPL-MCNC: 9 MG/DL — SIGNIFICANT CHANGE UP (ref 8.6–10.2)
CHLORIDE SERPL-SCNC: 102 MMOL/L — SIGNIFICANT CHANGE UP (ref 98–107)
CO2 SERPL-SCNC: 30 MMOL/L — HIGH (ref 22–29)
CREAT SERPL-MCNC: 2.14 MG/DL — HIGH (ref 0.5–1.3)
GLUCOSE BLDC GLUCOMTR-MCNC: 105 MG/DL — HIGH (ref 70–99)
GLUCOSE BLDC GLUCOMTR-MCNC: 106 MG/DL — HIGH (ref 70–99)
GLUCOSE BLDC GLUCOMTR-MCNC: 133 MG/DL — HIGH (ref 70–99)
GLUCOSE BLDC GLUCOMTR-MCNC: 90 MG/DL — SIGNIFICANT CHANGE UP (ref 70–99)
GLUCOSE SERPL-MCNC: 76 MG/DL — SIGNIFICANT CHANGE UP (ref 70–115)
HCT VFR BLD CALC: 42.4 % — SIGNIFICANT CHANGE UP (ref 42–52)
HGB BLD-MCNC: 14.1 G/DL — SIGNIFICANT CHANGE UP (ref 14–18)
MAGNESIUM SERPL-MCNC: 2.4 MG/DL — SIGNIFICANT CHANGE UP (ref 1.6–2.6)
MCHC RBC-ENTMCNC: 29.7 PG — SIGNIFICANT CHANGE UP (ref 27–31)
MCHC RBC-ENTMCNC: 33.3 G/DL — SIGNIFICANT CHANGE UP (ref 32–36)
MCV RBC AUTO: 89.5 FL — SIGNIFICANT CHANGE UP (ref 80–94)
PHOSPHATE SERPL-MCNC: 3.2 MG/DL — SIGNIFICANT CHANGE UP (ref 2.4–4.7)
PLATELET # BLD AUTO: 279 K/UL — SIGNIFICANT CHANGE UP (ref 150–400)
POTASSIUM SERPL-MCNC: 4.7 MMOL/L — SIGNIFICANT CHANGE UP (ref 3.5–5.3)
POTASSIUM SERPL-SCNC: 4.7 MMOL/L — SIGNIFICANT CHANGE UP (ref 3.5–5.3)
RBC # BLD: 4.74 M/UL — SIGNIFICANT CHANGE UP (ref 4.6–6.2)
RBC # FLD: 15.9 % — HIGH (ref 11–15.6)
SODIUM SERPL-SCNC: 145 MMOL/L — SIGNIFICANT CHANGE UP (ref 135–145)
WBC # BLD: 12.2 K/UL — HIGH (ref 4.8–10.8)
WBC # FLD AUTO: 12.2 K/UL — HIGH (ref 4.8–10.8)

## 2018-03-20 PROCEDURE — 95951: CPT | Mod: 26

## 2018-03-20 PROCEDURE — 99231 SBSQ HOSP IP/OBS SF/LOW 25: CPT

## 2018-03-20 PROCEDURE — 99233 SBSQ HOSP IP/OBS HIGH 50: CPT

## 2018-03-20 PROCEDURE — 99291 CRITICAL CARE FIRST HOUR: CPT

## 2018-03-20 PROCEDURE — 99222 1ST HOSP IP/OBS MODERATE 55: CPT

## 2018-03-20 RX ADMIN — LEVETIRACETAM 1500 MILLIGRAM(S): 250 TABLET, FILM COATED ORAL at 05:54

## 2018-03-20 RX ADMIN — ISOSORBIDE DINITRATE 10 MILLIGRAM(S): 5 TABLET ORAL at 13:06

## 2018-03-20 RX ADMIN — INSULIN GLARGINE 40 UNIT(S): 100 INJECTION, SOLUTION SUBCUTANEOUS at 06:59

## 2018-03-20 RX ADMIN — LEVETIRACETAM 1500 MILLIGRAM(S): 250 TABLET, FILM COATED ORAL at 17:20

## 2018-03-20 RX ADMIN — Medication 40 MILLIGRAM(S): at 05:52

## 2018-03-20 RX ADMIN — Medication 27.5 MILLIGRAM(S): at 22:11

## 2018-03-20 RX ADMIN — PENICILLIN G POTASSIUM 100 MILLION UNIT(S): 5000000 POWDER, FOR SOLUTION INTRAMUSCULAR; INTRAPLEURAL; INTRATHECAL; INTRAVENOUS at 17:20

## 2018-03-20 RX ADMIN — HEPARIN SODIUM 5000 UNIT(S): 5000 INJECTION INTRAVENOUS; SUBCUTANEOUS at 05:52

## 2018-03-20 RX ADMIN — PENICILLIN G POTASSIUM 100 MILLION UNIT(S): 5000000 POWDER, FOR SOLUTION INTRAMUSCULAR; INTRAPLEURAL; INTRATHECAL; INTRAVENOUS at 01:59

## 2018-03-20 RX ADMIN — Medication 27.5 MILLIGRAM(S): at 05:52

## 2018-03-20 RX ADMIN — ISOSORBIDE DINITRATE 10 MILLIGRAM(S): 5 TABLET ORAL at 05:53

## 2018-03-20 RX ADMIN — PENICILLIN G POTASSIUM 100 MILLION UNIT(S): 5000000 POWDER, FOR SOLUTION INTRAMUSCULAR; INTRAPLEURAL; INTRATHECAL; INTRAVENOUS at 09:20

## 2018-03-20 RX ADMIN — PENICILLIN G POTASSIUM 100 MILLION UNIT(S): 5000000 POWDER, FOR SOLUTION INTRAMUSCULAR; INTRAPLEURAL; INTRATHECAL; INTRAVENOUS at 22:11

## 2018-03-20 RX ADMIN — CARVEDILOL PHOSPHATE 6.25 MILLIGRAM(S): 80 CAPSULE, EXTENDED RELEASE ORAL at 05:52

## 2018-03-20 RX ADMIN — INSULIN GLARGINE 40 UNIT(S): 100 INJECTION, SOLUTION SUBCUTANEOUS at 22:13

## 2018-03-20 RX ADMIN — PENICILLIN G POTASSIUM 100 MILLION UNIT(S): 5000000 POWDER, FOR SOLUTION INTRAMUSCULAR; INTRAPLEURAL; INTRATHECAL; INTRAVENOUS at 05:54

## 2018-03-20 RX ADMIN — PENICILLIN G POTASSIUM 100 MILLION UNIT(S): 5000000 POWDER, FOR SOLUTION INTRAMUSCULAR; INTRAPLEURAL; INTRATHECAL; INTRAVENOUS at 13:06

## 2018-03-20 RX ADMIN — ATORVASTATIN CALCIUM 20 MILLIGRAM(S): 80 TABLET, FILM COATED ORAL at 22:11

## 2018-03-20 RX ADMIN — FINASTERIDE 5 MILLIGRAM(S): 5 TABLET, FILM COATED ORAL at 12:09

## 2018-03-20 RX ADMIN — ISOSORBIDE DINITRATE 10 MILLIGRAM(S): 5 TABLET ORAL at 22:13

## 2018-03-20 RX ADMIN — Medication 81 MILLIGRAM(S): at 12:09

## 2018-03-20 RX ADMIN — Medication 27.5 MILLIGRAM(S): at 15:23

## 2018-03-20 RX ADMIN — CARVEDILOL PHOSPHATE 6.25 MILLIGRAM(S): 80 CAPSULE, EXTENDED RELEASE ORAL at 17:20

## 2018-03-20 RX ADMIN — HEPARIN SODIUM 5000 UNIT(S): 5000 INJECTION INTRAVENOUS; SUBCUTANEOUS at 17:20

## 2018-03-20 NOTE — CONSULT NOTE ADULT - PROBLEM SELECTOR PROBLEM 3
Type 2 diabetes mellitus with complication, unspecified long term insulin use status
Abnormal sputum

## 2018-03-20 NOTE — PROGRESS NOTE ADULT - ATTENDING COMMENTS
I have seen and evaluated the patient independently and agree with the following assessment and plan:    - acute encephalopathy improving, speech and swallow, PT/OT  - stroke asa, statin, plavix  - repeat RPR in am  - pulmonary toilet   - palliative care consult

## 2018-03-20 NOTE — CONSULT NOTE ADULT - PROBLEM SELECTOR RECOMMENDATION 2
MEDICATIONS TO BE DOSED RENALLY
- workup thus far inconclusive other than acute stroke, but this doesn't explain his poor mental state prior to this stroke. being empirically treated for syphilis with Pen G. Also concerns for VZV meningitis, since he was being treated for shingles at home prior to admission. LP otherwise has been unremarkable. unable to do MRI because patient with cardiac device.

## 2018-03-20 NOTE — PROGRESS NOTE ADULT - PROBLEM SELECTOR PLAN 1
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

## 2018-03-20 NOTE — PROGRESS NOTE ADULT - PROBLEM SELECTOR PLAN 7
new L PCA infarct on CT yesterday afternoon  ASA/Statin  Neurology following  Family updated by Dr Hair

## 2018-03-20 NOTE — CONSULT NOTE ADULT - SUBJECTIVE AND OBJECTIVE BOX
HPI: 73M with PMH as listed admitted 3/9 with     PERTINENT PMH REVIEWED: Yes     PAST MEDICAL & SURGICAL HISTORY:  CHF (congestive heart failure)  HTN (hypertension)  Diabetes  No significant past surgical history    SOCIAL HISTORY:                                     Admitted from:  home  SNF  [facility]     Surrogate - Annette Clemens - spouse Jeison - son     FAMILY HISTORY:  No pertinent family history in first degree relatives    Baseline ADLs (prior to admission):  Independent/ Dependent      Allergies    No Known Allergies    Present Symptoms:     Dyspnea: 0 1 2 3   Nausea/Vomiting: Yes No  Anxiety:  Yes No  Depression: Yes No  Fatigue: Yes No  Loss of appetite: Yes No    Pain:             Character-            Duration-            Effect-            Factors-            Frequency-            Location-            Severity-    Review of Systems: Reviewed                     Negative:                     Positive:  Unable to obtain due to poor mentation   All others negative    MEDICATIONS  (STANDING):  aspirin  chewable 81 milliGRAM(s) Oral daily  atorvastatin 20 milliGRAM(s) Oral at bedtime  carvedilol 6.25 milliGRAM(s) Oral every 12 hours  dextrose 5%. 1000 milliLiter(s) (50 mL/Hr) IV Continuous <Continuous>  dextrose 50% Injectable 12.5 Gram(s) IV Push once  dextrose 50% Injectable 25 Gram(s) IV Push once  dextrose 50% Injectable 25 Gram(s) IV Push once  finasteride 5 milliGRAM(s) Oral daily  furosemide   Injectable 40 milliGRAM(s) IV Push daily  heparin  Injectable 5000 Unit(s) SubCutaneous every 12 hours  insulin glargine Injectable (LANTUS) 40 Unit(s) SubCutaneous two times a day  insulin lispro (HumaLOG) corrective regimen sliding scale   SubCutaneous Before meals and at bedtime  isosorbide   dinitrate Tablet (ISORDIL) 10 milliGRAM(s) Oral three times a day  levETIRAcetam  Solution 1500 milliGRAM(s) Oral two times a day  penicillin   G  potassium  IVPB      penicillin   G  potassium  IVPB 3 Million Unit(s) IV Intermittent every 4 hours  valproate sodium IVPB 500 milliGRAM(s) IV Intermittent every 8 hours    MEDICATIONS  (PRN):  ALBUTerol    90 MICROgram(s) HFA Inhaler 2 Puff(s) Inhalation every 6 hours PRN Shortness of Breath  dextrose Gel 1 Dose(s) Oral once PRN Blood Glucose LESS THAN 70 milliGRAM(s)/deciLiter  glucagon  Injectable 1 milliGRAM(s) IntraMuscular once PRN Glucose <70 milliGRAM(s)/deciLiter    PHYSICAL EXAM:    Vital Signs Last 24 Hrs  T(C): 37.7 (20 Mar 2018 08:01), Max: 37.8 (19 Mar 2018 16:08)  T(F): 99.9 (20 Mar 2018 08:01), Max: 100 (19 Mar 2018 16:08)  HR: 90 (20 Mar 2018 12:00) (86 - 98)  BP: 145/82 (20 Mar 2018 12:00) (109/51 - 164/99)  BP(mean): 105 (20 Mar 2018 12:00) (69 - 120)  RR: 31 (20 Mar 2018 12:00) (15 - 31)  SpO2: 100% (20 Mar 2018 12:00) (93% - 100%)    General: alert  oriented x ____ lethargic agitated                  cachexia  nonverbal  coma    Karnofsky:  %    HEENT: normal  dry mouth  ET tube/trach    Lungs: comfortable tachypnea/labored breathing  excessive secretions    CV: normal  tachycardia    GI: normal  distended  tender  no BS               PEG/NG/OG tube  constipation  last BM:     : normal  incontinent  oliguria/anuria  christopher    MSK: normal  weakness  edema             ambulatory  bedbound/wheelchair bound    Skin: normal  pressure ulcers- Stage_____  no rash    LABS:                      14.1   12.2  )-----------( 279      ( 20 Mar 2018 05:21 )             42.4     03-20    145  |  102  |  58.0<H>  ----------------------------<  76  4.7   |  30.0<H>  |  2.14<H>    Ca    9.0      20 Mar 2018 05:21  Phos  3.2     03-20  Mg     2.4     03-20    I&O's Summary    19 Mar 2018 07:01  -  20 Mar 2018 07:00  --------------------------------------------------------  IN: 960 mL / OUT: 1705 mL / NET: -745 mL    20 Mar 2018 07:01  -  20 Mar 2018 13:19  --------------------------------------------------------  IN: 440 mL / OUT: 450 mL / NET: -10 mL    RADIOLOGY & ADDITIONAL STUDIES:    < from: CT Head No Cont (03.19.18 @ 12:05) >  Acute/subacute moderate left PCA territory infarct involving the left temporal lobe and left occipital lobe.    < from: Xray Chest 1 View- PORTABLE-Urgent (03.16.18 @ 18:09) >  NG tube tip beyond GE junction .    < from: Xray Chest 1 View AP/PA. (03.13.18 @ 22:46) >  Cardiomegaly. No infiltrates. No nasogastric tube is seen    ADVANCE DIRECTIVES: Full code HPI: 73M with PMH as listed admitted 3/9 with unresponsiveness found by wife, intubated for hypoxic respiratory failure. Had seizure, concerns for encephalitis, in setting of recent shingles at home prior to admission. LP was unremarkable, initial workup for stroke negative, EEG negative for seizures. Extubated on 3/11. Persistent encephalopathy, now found to have acute left PCA infarct, with aspiration, copious secretions requiring frequent suctioning. Also found to have PLEDS on EEg.     PERTINENT PMH REVIEWED: Yes     PAST MEDICAL & SURGICAL HISTORY:  CHF (congestive heart failure)  HTN (hypertension)  Diabetes  No significant past surgical history    SOCIAL HISTORY:  former smoker; no drugs                                    Admitted from:  home     Surrogate - Annette Clemens - spouse Jeison - son     FAMILY HISTORY:  No pertinent family history in first degree relatives    Baseline ADLs (prior to admission):  Independent/ Dependent      Allergies    No Known Allergies    Present Symptoms:     Dyspnea: 0 1 2 3   Nausea/Vomiting: Yes No  Anxiety:  Yes No  Depression: Yes No  Fatigue: Yes No  Loss of appetite: Yes No    Pain:             Character-            Duration-            Effect-            Factors-            Frequency-            Location-            Severity-    Review of Systems: Reviewed                     Negative:                     Positive:  Unable to obtain due to poor mentation   All others negative    MEDICATIONS  (STANDING):  aspirin  chewable 81 milliGRAM(s) Oral daily  atorvastatin 20 milliGRAM(s) Oral at bedtime  carvedilol 6.25 milliGRAM(s) Oral every 12 hours  dextrose 5%. 1000 milliLiter(s) (50 mL/Hr) IV Continuous <Continuous>  dextrose 50% Injectable 12.5 Gram(s) IV Push once  dextrose 50% Injectable 25 Gram(s) IV Push once  dextrose 50% Injectable 25 Gram(s) IV Push once  finasteride 5 milliGRAM(s) Oral daily  furosemide   Injectable 40 milliGRAM(s) IV Push daily  heparin  Injectable 5000 Unit(s) SubCutaneous every 12 hours  insulin glargine Injectable (LANTUS) 40 Unit(s) SubCutaneous two times a day  insulin lispro (HumaLOG) corrective regimen sliding scale   SubCutaneous Before meals and at bedtime  isosorbide   dinitrate Tablet (ISORDIL) 10 milliGRAM(s) Oral three times a day  levETIRAcetam  Solution 1500 milliGRAM(s) Oral two times a day  penicillin   G  potassium  IVPB      penicillin   G  potassium  IVPB 3 Million Unit(s) IV Intermittent every 4 hours  valproate sodium IVPB 500 milliGRAM(s) IV Intermittent every 8 hours    MEDICATIONS  (PRN):  ALBUTerol    90 MICROgram(s) HFA Inhaler 2 Puff(s) Inhalation every 6 hours PRN Shortness of Breath  dextrose Gel 1 Dose(s) Oral once PRN Blood Glucose LESS THAN 70 milliGRAM(s)/deciLiter  glucagon  Injectable 1 milliGRAM(s) IntraMuscular once PRN Glucose <70 milliGRAM(s)/deciLiter    PHYSICAL EXAM:    Vital Signs Last 24 Hrs  T(C): 37.7 (20 Mar 2018 08:01), Max: 37.8 (19 Mar 2018 16:08)  T(F): 99.9 (20 Mar 2018 08:01), Max: 100 (19 Mar 2018 16:08)  HR: 90 (20 Mar 2018 12:00) (86 - 98)  BP: 145/82 (20 Mar 2018 12:00) (109/51 - 164/99)  BP(mean): 105 (20 Mar 2018 12:00) (69 - 120)  RR: 31 (20 Mar 2018 12:00) (15 - 31)  SpO2: 100% (20 Mar 2018 12:00) (93% - 100%)    General: alert  oriented x ____ lethargic agitated                  cachexia  nonverbal  coma    Karnofsky:  %    HEENT: normal  dry mouth  ET tube/trach    Lungs: comfortable tachypnea/labored breathing  excessive secretions    CV: normal  tachycardia    GI: normal  distended  tender  no BS               PEG/NG/OG tube  constipation  last BM:     : normal  incontinent  oliguria/anuria  christopher    MSK: normal  weakness  edema             ambulatory  bedbound/wheelchair bound    Skin: normal  pressure ulcers- Stage_____  no rash    LABS:                      14.1   12.2  )-----------( 279      ( 20 Mar 2018 05:21 )             42.4     03-20    145  |  102  |  58.0<H>  ----------------------------<  76  4.7   |  30.0<H>  |  2.14<H>    Ca    9.0      20 Mar 2018 05:21  Phos  3.2     03-20  Mg     2.4     03-20    I&O's Summary    19 Mar 2018 07:01  -  20 Mar 2018 07:00  --------------------------------------------------------  IN: 960 mL / OUT: 1705 mL / NET: -745 mL    20 Mar 2018 07:01  -  20 Mar 2018 13:19  --------------------------------------------------------  IN: 440 mL / OUT: 450 mL / NET: -10 mL    RADIOLOGY & ADDITIONAL STUDIES:    < from: CT Head No Cont (03.19.18 @ 12:05) >  Acute/subacute moderate left PCA territory infarct involving the left temporal lobe and left occipital lobe.    < from: Xray Chest 1 View- PORTABLE-Urgent (03.16.18 @ 18:09) >  NG tube tip beyond GE junction .    < from: Xray Chest 1 View AP/PA. (03.13.18 @ 22:46) >  Cardiomegaly. No infiltrates. No nasogastric tube is seen    ADVANCE DIRECTIVES: Full code HPI: 73M with PMH as listed admitted 3/9 with unresponsiveness found by wife, intubated for hypoxic respiratory failure. Had seizure, concerns for encephalitis, in setting of recent shingles at home prior to admission. LP was unremarkable, initial workup for stroke negative, EEG negative for seizures. Extubated on 3/11. Persistent encephalopathy, now found to have acute left PCA infarct, with aspiration, copious secretions requiring frequent suctioning. Also found to have PLEDS on EEg.     PERTINENT PMH REVIEWED: Yes     PAST MEDICAL & SURGICAL HISTORY:  CHF (congestive heart failure)  HTN (hypertension)  Diabetes  No significant past surgical history    SOCIAL HISTORY:  former smoker; no drugs                                    Admitted from:  home     Surrogate - Annette Clemens - spouse Jeison - son     FAMILY HISTORY:  No pertinent family history in first degree relatives    Baseline ADLs (prior to admission):  Independent/ Dependent      Allergies    No Known Allergies    Present Symptoms:     Dyspnea: 1  Nausea/Vomiting: No  Anxiety:  No  Depression: unable   Fatigue: Yes   Loss of appetite: unable     Pain: none             Character-            Duration-            Effect-            Factors-            Frequency-            Location-            Severity-    Review of Systems: Reviewed                                        Positive: copious secretions   All others negative    MEDICATIONS  (STANDING):  aspirin  chewable 81 milliGRAM(s) Oral daily  atorvastatin 20 milliGRAM(s) Oral at bedtime  carvedilol 6.25 milliGRAM(s) Oral every 12 hours  dextrose 5%. 1000 milliLiter(s) (50 mL/Hr) IV Continuous <Continuous>  dextrose 50% Injectable 12.5 Gram(s) IV Push once  dextrose 50% Injectable 25 Gram(s) IV Push once  dextrose 50% Injectable 25 Gram(s) IV Push once  finasteride 5 milliGRAM(s) Oral daily  furosemide   Injectable 40 milliGRAM(s) IV Push daily  heparin  Injectable 5000 Unit(s) SubCutaneous every 12 hours  insulin glargine Injectable (LANTUS) 40 Unit(s) SubCutaneous two times a day  insulin lispro (HumaLOG) corrective regimen sliding scale   SubCutaneous Before meals and at bedtime  isosorbide   dinitrate Tablet (ISORDIL) 10 milliGRAM(s) Oral three times a day  levETIRAcetam  Solution 1500 milliGRAM(s) Oral two times a day  penicillin   G  potassium  IVPB      penicillin   G  potassium  IVPB 3 Million Unit(s) IV Intermittent every 4 hours  valproate sodium IVPB 500 milliGRAM(s) IV Intermittent every 8 hours    MEDICATIONS  (PRN):  ALBUTerol    90 MICROgram(s) HFA Inhaler 2 Puff(s) Inhalation every 6 hours PRN Shortness of Breath  dextrose Gel 1 Dose(s) Oral once PRN Blood Glucose LESS THAN 70 milliGRAM(s)/deciLiter  glucagon  Injectable 1 milliGRAM(s) IntraMuscular once PRN Glucose <70 milliGRAM(s)/deciLiter    PHYSICAL EXAM:    Vital Signs Last 24 Hrs  T(C): 37.7 (20 Mar 2018 08:01), Max: 37.8 (19 Mar 2018 16:08)  T(F): 99.9 (20 Mar 2018 08:01), Max: 100 (19 Mar 2018 16:08)  HR: 90 (20 Mar 2018 12:00) (86 - 98)  BP: 145/82 (20 Mar 2018 12:00) (109/51 - 164/99)  BP(mean): 105 (20 Mar 2018 12:00) (69 - 120)  RR: 31 (20 Mar 2018 12:00) (15 - 31)  SpO2: 100% (20 Mar 2018 12:00) (93% - 100%)    General: lethargic     Karnofsky:  30 %    HEENT: normal     Lungs: excessive secretions    CV: normal      GI: normal; NG tube     MSK: weakness              Skin: no rash    LABS:                      14.1   12.2  )-----------( 279      ( 20 Mar 2018 05:21 )             42.4     03-20    145  |  102  |  58.0<H>  ----------------------------<  76  4.7   |  30.0<H>  |  2.14<H>    Ca    9.0      20 Mar 2018 05:21  Phos  3.2     03-20  Mg     2.4     03-20    I&O's Summary    19 Mar 2018 07:01  -  20 Mar 2018 07:00  --------------------------------------------------------  IN: 960 mL / OUT: 1705 mL / NET: -745 mL    20 Mar 2018 07:01  -  20 Mar 2018 13:19  --------------------------------------------------------  IN: 440 mL / OUT: 450 mL / NET: -10 mL    RADIOLOGY & ADDITIONAL STUDIES:    < from: CT Head No Cont (03.19.18 @ 12:05) >  Acute/subacute moderate left PCA territory infarct involving the left temporal lobe and left occipital lobe.    < from: Xray Chest 1 View- PORTABLE-Urgent (03.16.18 @ 18:09) >  NG tube tip beyond GE junction .    < from: Xray Chest 1 View AP/PA. (03.13.18 @ 22:46) >  Cardiomegaly. No infiltrates. No nasogastric tube is seen    ADVANCE DIRECTIVES: Full code

## 2018-03-20 NOTE — PROGRESS NOTE ADULT - SUBJECTIVE AND OBJECTIVE BOX
[facility]  INFECTIOUS DISEASES AND INTERNAL MEDICINE at [place]  =======================================================  Basilio Zavala MD  Diplomates American Board of Internal Medicine and Infectious Diseases  =======================================================    KATELYNN AGUILAR [id number]  follow up on lethargy  patient seen and examined in follow up.  Chart and labs reviewed.   Cultures negative  AWAKE BUT LETHARGIC    =======================================================  Allergies:  No Known Allergies    =======================================================  MEDICATIONS  (STANDING):  aspirin  chewable 81 milliGRAM(s) Oral daily  atorvastatin 20 milliGRAM(s) Oral at bedtime  carvedilol 6.25 milliGRAM(s) Oral every 12 hours  dextrose 5%. 1000 milliLiter(s) (50 mL/Hr) IV Continuous <Continuous>  dextrose 50% Injectable 12.5 Gram(s) IV Push once  dextrose 50% Injectable 25 Gram(s) IV Push once  dextrose 50% Injectable 25 Gram(s) IV Push once  finasteride 5 milliGRAM(s) Oral daily  furosemide   Injectable 40 milliGRAM(s) IV Push daily  heparin  Injectable 5000 Unit(s) SubCutaneous every 12 hours  insulin glargine Injectable (LANTUS) 40 Unit(s) SubCutaneous two times a day  insulin lispro (HumaLOG) corrective regimen sliding scale   SubCutaneous Before meals and at bedtime  isosorbide   dinitrate Tablet (ISORDIL) 10 milliGRAM(s) Oral three times a day  levETIRAcetam  Solution 1500 milliGRAM(s) Oral two times a day  pantoprazole  Injectable 40 milliGRAM(s) IV Push two times a day  penicillin   G  potassium  IVPB      penicillin   G  potassium  IVPB 3 Million Unit(s) IV Intermittent every 4 hours  valproate sodium IVPB 500 milliGRAM(s) IV Intermittent every 8 hours       =======================================================     REVIEW OF SYSTEMS:  unable to obtain  =======================================================    Physical Exam:  Vital Signs Last 24 Hrs   ICU Vital Signs Last 24 Hrs  T(C): 37.7 (20 Mar 2018 08:01), Max: 38.3 (19 Mar 2018 12:00)  T(F): 99.9 (20 Mar 2018 08:01), Max: 100.9 (19 Mar 2018 12:00)  HR: 91 (20 Mar 2018 10:00) (86 - 99)  BP: 137/70 (20 Mar 2018 10:00) (109/51 - 164/99)  BP(mean): 94 (20 Mar 2018 10:00) (69 - 120)  ABP: --  ABP(mean): --  RR: 22 (20 Mar 2018 10:00) (15 - 30)  SpO2: 99% (20 Mar 2018 10:00) (93% - 99%)      GEN: NAD, LETHARGIC    HEENT: normocephalic and atraumatic. EOMI.    NECK: Supple.   LUNGS: Clear to auscultation.  HEART: Regular rate and rhythm without murmur.  ABDOMEN: Soft, nontender, and nondistended.  Positive bowel sounds.    : + christopher in place  EXTREMITIES: Without any cyanosis, clubbing, rash, lesions or edema.  MSK: no joint swelling  NEUROLOGIC: LETHARGIC  SKIN: No ulceration or induration present.    =======================================================                                               14.1   12.2  )-----------( 279      ( 20 Mar 2018 05:21 )             42.4   03-    145  |  102  |  58.0<H>  ----------------------------<  76  4.7   |  30.0<H>  |  2.14<H>    Ca    9.0      20 Mar 2018 05:21  Phos  3.2       Mg     2.4               Urinalysis Basic - ( 17 Mar 2018 10:20 )    Color: Yellow / Appearance: Clear / S.010 / pH: x  Gluc: x / Ketone: Negative  / Bili: Negative / Urobili: Negative mg/dL   Blood: x / Protein: 100 mg/dL / Nitrite: Negative   Leuk Esterase: Negative / RBC: 6-10 /HPF / WBC 0-2   Sq Epi: x / Non Sq Epi: Few / Bacteria: Occasional           RECENT CULTURES:  03-10 @ 10:29 .CSF CSF     No growth at 3 days.    No White blood cells  No organisms seen      03-10 @ 10:05 .Blood Blood-Peripheral     No growth at 5 days.       03-10 @ 10:04 .Blood Blood-Peripheral     No growth at 5 days.

## 2018-03-20 NOTE — CONSULT NOTE ADULT - ATTENDING COMMENTS
Thank you for the opportunity to assist with the care of this patient.   [facility] Consult [facility] [phone].

## 2018-03-20 NOTE — CONSULT NOTE ADULT - PROBLEM SELECTOR RECOMMENDATION 4
- met wife at bedside. She is understanding of his difficult situation currently. She is praying that he gets better. I explained to her that if his mental state remains so poor, he is at risk for decompensation due to pneumonia, copious secretions, and possibly may require breathing tube and trach and PEG. She realizes this. She has two sons who help her make decisions. Will continue to follow along. Unfortunately, at this time, prognosis appears poor.

## 2018-03-20 NOTE — PROGRESS NOTE ADULT - SUBJECTIVE AND OBJECTIVE BOX
[facility]                                        Neurology at [place]                                 Marco Cox, & Austyn                                  [address]                                        [place], [place], [address]                                             [phone]        No new complaints.    Vital Signs Last 24 Hrs  T(F): 99.9 (20 Mar 2018 08:01), Max: 100 (19 Mar 2018 16:08)  HR: 91 (20 Mar 2018 11:00) (86 - 98)  BP: 149/84 (20 Mar 2018 11:00) (109/51 - 164/99)  BP(mean): 111 (20 Mar 2018 11:00) (69 - 120)  RR: 25 (20 Mar 2018 11:00) (15 - 30)  SpO2: 100% (20 Mar 2018 11:00) (93% - 100%)    Slight eye opening to voice.   Not following instructions.   Pupils react.  Face symmetric.  moving left UE to stimuli and spontaneously.   Moves lower extremities to stimuli.   No movement right UE.     CT yesterday shows Left temporal-occipital infarct.

## 2018-03-20 NOTE — CONSULT NOTE ADULT - ASSESSMENT
The patient is a 73y Male with recent shingles now presenting with seizure and unresponsiveness.   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     EEG being hooked up now for continuous monitoring.     CSF pending.     Would continue keppra as ordered.     Continue antibiotics and Acyclovir (consider ID evaluation).     Addendum:   EEG started and appears to show right temporal spike focus.       All above discussed with Dr Hair.
THIS 73 Y.O. MAN HERE FOR ENCEPHALOPATHY, RECENT HX OF ZOSTER, EPISODES OF SEIZURES, INFECTIOUS WORKUP IN PROCESS.
73M with acute L PCA infarct, persistent encephalopathy, concerns for encephalitis, PLEDs, copious secretions concerns for aspiration, dysphagia with NG tube.

## 2018-03-20 NOTE — EEG REPORT - NS EEG TEXT BOX
[facility]  Comprehensive Epilepsy Center  Report of Continuous Video EEG    [facility]: [address] , [address], [place] [address], Ph#: [phone]  [facility]: [id number] [place] [address], [place] [address], Ph#: [phone]  Office: [address] [facility], [facility] [address], [place] [address] Ph#: [phone]    Patient Name: Reynaldo Clemens    Age: 73 y, : 1945  Patient ID: -, MRN #: -, Traore: -  Referring Physician: Vickie Gunderson  EEG #: [id number]    Study Date: 3/17/2018    Study Information:    EEG Recording Technique:  The patient underwent continuous Video-EEG monitoring, using Telemetry System hardware on the XLTek Digital System. EEG and video data were stored on a computer hard drive with important events saved in digital archive files. The material was reviewed by a physician (electroencephalographer / epileptologist) on a daily basis. Antonio and seizure detection algorithms were utilized and reviewed. An EEG Technician attended to the patient, and was available throughout daytime work hours.  The epilepsy center neurologist was available in person or on call 24-hours per day.    EEG Placement and Labeling of Electrodes:  The EEG was performed utilizing 20 channel referential EEG connections (coronal over temporal over parasagittal montage) using all standard 10-20 electrode placements with EKG, with additional electrodes placed in the inferior temporal region using the modified 10-10 montage electrode placements for elective admissions, or if deemed necessary. Recording was at a sampling rate of 256 samples per second per channel. Time synchronized digital video recording was done simultaneously with EEG recording. A low light infrared camera was used for low light recording.     History: Seizures  -  -    Medication	  Insulin	  Keppra	  -	  	  Ativan	    Study Interpretation:    FINDINGS:  The background consists of diffuse low amplitude 10-20 UV mixed frequency alpha and beta activity over the left hemisphere admixed beta. Higher amplitude theta seen over the right hemisphere.    Much reduced frequency and amplitude of right hemisphere PLEDS compared to last 24 hours,Spikes still seen maximally posteriorly in the right temporal and parieto-occipital region.    Background Slowing:  Mild -moderate  generalized slowing .    Focal Slowing:   Right hemisphere with a temporal predominance.    Sleep Background:  Normal sleep transients not recorded.    Other Paroxysmal Non-Epileptiform Findings:  None were present.    Interictal Epileptiform Activity:   Right  centro-temporal PLEDs     Events:  Clinical events: None recorded.  Seizures: None recorded.    Artifacts:  Intermittent myogenic and movement artifacts were noted.      EEG Impression:  Abnormal VEEG due to the presence of:  1.	Mild generalized slowing   2.	right centro-temporal slowing  3.	right hemisphere PLEDs nearly resolved with frequent isolated low amplitude right parieto-temporal sharp waves  4.	No clinical seizures    Clinical Correlation:  The study is consistent with a resolving toxic-metabolic encephalopathy and near resolution of PLEDS.    Lb Reyes MD  Director of Neurology, [facility]  Attending Epileptologist and , [facility]

## 2018-03-20 NOTE — CONSULT NOTE ADULT - PROBLEM SELECTOR RECOMMENDATION 3
-aspiration precautions, requiring frequent suctioning, very high risk for developing pneumonia, and/or requiring intubation, and possible trach if mental state remains so poor.

## 2018-03-20 NOTE — PROGRESS NOTE ADULT - SUBJECTIVE AND OBJECTIVE BOX
Patient is a 73y old  Male who presents with a chief complaint of Unresponsive (10 Mar 2018 05:56)      BRIEF HOSPITAL COURSE: 74 yo male, PMHx DM, HF, HTN, who presented BIBA after being found unresponsive by wife at home, intubated for hypoxemic respiratory failure, r/o acute CVA vs encephalopathy post-ictal state secondary to seizures. Concerns for encephalitis secondary to VZV meningitis in differential, which was negative, Syphilis equivocal. Persistently encephalopathic, had a decline in mental status,  PLEDS on EEG.  Yesterday pt with increased secretions, possible aspiration.  Requiring NT suctioning,, not clearing secretions well.  Keep upright, chest PT, NT suctioning as needed.  Monitor oxygenation, increased fi02 requiring this am.      Events last 24 hours: Pt slightly more responsive today.  Still requiring NT suctioning this am for upper airway secretions this am.  Will stop EEG this am.  Purposeful with left UE, grabbing at me when NT suctioning.    PAST MEDICAL & SURGICAL HISTORY:  CHF (congestive heart failure)  HTN (hypertension)  Diabetes  No significant past surgical history      Review of Systems: unable to obtain, patient encephalopathic      MEDICATIONS  (STANDING):  aspirin  chewable 81 milliGRAM(s) Oral daily  atorvastatin 20 milliGRAM(s) Oral at bedtime  carvedilol 6.25 milliGRAM(s) Oral every 12 hours  dextrose 5%. 1000 milliLiter(s) (50 mL/Hr) IV Continuous <Continuous>  dextrose 50% Injectable 12.5 Gram(s) IV Push once  dextrose 50% Injectable 25 Gram(s) IV Push once  dextrose 50% Injectable 25 Gram(s) IV Push once  finasteride 5 milliGRAM(s) Oral daily  furosemide   Injectable 40 milliGRAM(s) IV Push daily  heparin  Injectable 5000 Unit(s) SubCutaneous every 12 hours  insulin glargine Injectable (LANTUS) 40 Unit(s) SubCutaneous two times a day  insulin lispro (HumaLOG) corrective regimen sliding scale   SubCutaneous Before meals and at bedtime  isosorbide   dinitrate Tablet (ISORDIL) 10 milliGRAM(s) Oral three times a day  levETIRAcetam  Solution 1500 milliGRAM(s) Oral two times a day  penicillin   G  potassium  IVPB      penicillin   G  potassium  IVPB 3 Million Unit(s) IV Intermittent every 4 hours  valproate sodium IVPB 500 milliGRAM(s) IV Intermittent every 8 hours                 14.1   12.2   )----------(  279       ( 20 Mar 2018 05:21 )               42.4      145    |  102    |  58.0   ----------------------------<  76         ( 20 Mar 2018 05:21 )  4.7     |  30.0   |  2.14     Ca    9.0        ( 20 Mar 2018 05:21 )  Phos  3.2       ( 20 Mar 2018 05:21 )  Mg     2.4       ( 20 Mar 2018 05:21 )    ICU Vital Signs Last 24 Hrs  T(C): 37.7 (20 Mar 2018 08:01), Max: 38.3 (19 Mar 2018 12:00)  T(F): 99.9 (20 Mar 2018 08:01), Max: 100.9 (19 Mar 2018 12:00)  HR: 92 (20 Mar 2018 08:00) (86 - 104)  BP: 141/70 (20 Mar 2018 08:00) (109/51 - 164/99)  BP(mean): 100 (20 Mar 2018 08:00) (69 - 120)  ABP: --  ABP(mean): --  RR: 15 (20 Mar 2018 08:00) (15 - 34)  SpO2: 95% (20 Mar 2018 08:00) (93% - 99%)    I&O's Detail    19 Mar 2018 07:01  -  20 Mar 2018 07:00  --------------------------------------------------------  IN:    Free Water: 400 mL    Glucerna: 60 mL    Solution: 100 mL    Solution: 400 mL  Total IN: 960 mL    OUT:    Indwelling Catheter - Urethral: 1705 mL  Total OUT: 1705 mL    Total NET: -745 mL      RECENT CULTURES:  03-17 .Blood Blood-Peripheral XXXX XXXX   No growth at 48 hours    03-17 .Blood Blood-Peripheral XXXX XXXX   No growth at 48 hours        Physical Examination:    General: Obese male lying in bed increased secretions this am    HEENT: Pupils equal, reactive to light.  Symmetric.    PULM: Course b/l, mod significant sputum production tan    CVS: Sinus tachycardia, no murmurs, rubs, or gallops    ABD: Soft, nondistended, nontender, normoactive bowel sounds, no masses    EXT: Generalized anasarca, nontender    SKIN: Warm and well perfused, no rashes noted.    NEURO: moving purposefully with LUE today    RADIOLOGY: old images reviewed    EEG:  EEG Impression:  Abnormal VEEG due to the presence of:  [id number].	Mild generalized slowing   [id number].	Improvement in background compared with 3/11 veeg. Still with voltage suppression over right anterior temporal region.  -[id number].	Re-appearance of right hemisphere PLEDs although more posteriorly located in the parieto-temporal region.  -[id number].	No clinical seizures    Clinical Correlation:  The study is consistent with a resolving toxic-metabolic encephalopathy and re-emergence of right hemisphere PLEDS in a more posterior location. Correlation with imaging recommended.      Lb Reyes MD  Director of Neurology, [facility]  Attending Epileptologist and , [facility]          Electronic Signatures:  Lb Reyes)  (Signed 17-Mar-2018 07:52)    CRITICAL CARE TIME SPENT: I have spent 45 minutes of critical care time evaluating and treating this patient, including reviewing charts, labs, imagining studies, and collaborating with interdisciplinary team.

## 2018-03-20 NOTE — CONSULT NOTE ADULT - PROBLEM SELECTOR RECOMMENDATION 9
WORKUP IN PROCESS.  HSV AND VZV NEGATIVE FROM CSF; WILL MAINTAIN ACYCLOVIR FOR NOW  CHECK RPR AND VDRL FROM CSF
- medical management  -need to give him time to see if he has any recovery, currently has right sided weakness secondary to stroke, spontaneously moving the left side.   -neuro is following

## 2018-03-21 DIAGNOSIS — R53.81 OTHER MALAISE: ICD-10-CM

## 2018-03-21 LAB
ANION GAP SERPL CALC-SCNC: 12 MMOL/L — SIGNIFICANT CHANGE UP (ref 5–17)
APTT BLD: 24.1 SEC — LOW (ref 27.5–37.4)
BUN SERPL-MCNC: 67 MG/DL — HIGH (ref 8–20)
CALCIUM SERPL-MCNC: 9.3 MG/DL — SIGNIFICANT CHANGE UP (ref 8.6–10.2)
CHLORIDE SERPL-SCNC: 100 MMOL/L — SIGNIFICANT CHANGE UP (ref 98–107)
CO2 SERPL-SCNC: 33 MMOL/L — HIGH (ref 22–29)
CREAT SERPL-MCNC: 2.33 MG/DL — HIGH (ref 0.5–1.3)
GLUCOSE BLDC GLUCOMTR-MCNC: 111 MG/DL — HIGH (ref 70–99)
GLUCOSE BLDC GLUCOMTR-MCNC: 116 MG/DL — HIGH (ref 70–99)
GLUCOSE BLDC GLUCOMTR-MCNC: 121 MG/DL — HIGH (ref 70–99)
GLUCOSE BLDC GLUCOMTR-MCNC: 121 MG/DL — HIGH (ref 70–99)
GLUCOSE BLDC GLUCOMTR-MCNC: 158 MG/DL — HIGH (ref 70–99)
GLUCOSE SERPL-MCNC: 108 MG/DL — SIGNIFICANT CHANGE UP (ref 70–115)
HCT VFR BLD CALC: 41.1 % — LOW (ref 42–52)
HGB BLD-MCNC: 12.9 G/DL — LOW (ref 14–18)
INR BLD: 1.2 RATIO — HIGH (ref 0.88–1.16)
MAGNESIUM SERPL-MCNC: 2.5 MG/DL — SIGNIFICANT CHANGE UP (ref 1.6–2.6)
MCHC RBC-ENTMCNC: 29.3 PG — SIGNIFICANT CHANGE UP (ref 27–31)
MCHC RBC-ENTMCNC: 31.4 G/DL — LOW (ref 32–36)
MCV RBC AUTO: 93.2 FL — SIGNIFICANT CHANGE UP (ref 80–94)
PHOSPHATE SERPL-MCNC: 3 MG/DL — SIGNIFICANT CHANGE UP (ref 2.4–4.7)
PLATELET # BLD AUTO: 337 K/UL — SIGNIFICANT CHANGE UP (ref 150–400)
POTASSIUM SERPL-MCNC: 4.9 MMOL/L — SIGNIFICANT CHANGE UP (ref 3.5–5.3)
POTASSIUM SERPL-SCNC: 4.9 MMOL/L — SIGNIFICANT CHANGE UP (ref 3.5–5.3)
PROTHROM AB SERPL-ACNC: 13.2 SEC — HIGH (ref 9.8–12.7)
RBC # BLD: 4.41 M/UL — LOW (ref 4.6–6.2)
RBC # FLD: 15.9 % — HIGH (ref 11–15.6)
SODIUM SERPL-SCNC: 145 MMOL/L — SIGNIFICANT CHANGE UP (ref 135–145)
WBC # BLD: 14.9 K/UL — HIGH (ref 4.8–10.8)
WBC # FLD AUTO: 14.9 K/UL — HIGH (ref 4.8–10.8)

## 2018-03-21 PROCEDURE — 99233 SBSQ HOSP IP/OBS HIGH 50: CPT

## 2018-03-21 PROCEDURE — 99231 SBSQ HOSP IP/OBS SF/LOW 25: CPT

## 2018-03-21 PROCEDURE — 71045 X-RAY EXAM CHEST 1 VIEW: CPT | Mod: 26

## 2018-03-21 RX ORDER — FUROSEMIDE 40 MG
40 TABLET ORAL
Qty: 0 | Refills: 0 | Status: DISCONTINUED | OUTPATIENT
Start: 2018-03-21 | End: 2018-03-24

## 2018-03-21 RX ADMIN — Medication 40 MILLIGRAM(S): at 05:42

## 2018-03-21 RX ADMIN — CARVEDILOL PHOSPHATE 6.25 MILLIGRAM(S): 80 CAPSULE, EXTENDED RELEASE ORAL at 17:26

## 2018-03-21 RX ADMIN — PENICILLIN G POTASSIUM 100 MILLION UNIT(S): 5000000 POWDER, FOR SOLUTION INTRAMUSCULAR; INTRAPLEURAL; INTRATHECAL; INTRAVENOUS at 02:00

## 2018-03-21 RX ADMIN — ISOSORBIDE DINITRATE 10 MILLIGRAM(S): 5 TABLET ORAL at 13:34

## 2018-03-21 RX ADMIN — FINASTERIDE 5 MILLIGRAM(S): 5 TABLET, FILM COATED ORAL at 12:02

## 2018-03-21 RX ADMIN — INSULIN GLARGINE 40 UNIT(S): 100 INJECTION, SOLUTION SUBCUTANEOUS at 08:23

## 2018-03-21 RX ADMIN — Medication 40 MILLIGRAM(S): at 17:27

## 2018-03-21 RX ADMIN — PENICILLIN G POTASSIUM 100 MILLION UNIT(S): 5000000 POWDER, FOR SOLUTION INTRAMUSCULAR; INTRAPLEURAL; INTRATHECAL; INTRAVENOUS at 21:52

## 2018-03-21 RX ADMIN — LEVETIRACETAM 1500 MILLIGRAM(S): 250 TABLET, FILM COATED ORAL at 17:25

## 2018-03-21 RX ADMIN — CARVEDILOL PHOSPHATE 6.25 MILLIGRAM(S): 80 CAPSULE, EXTENDED RELEASE ORAL at 05:42

## 2018-03-21 RX ADMIN — ISOSORBIDE DINITRATE 10 MILLIGRAM(S): 5 TABLET ORAL at 21:51

## 2018-03-21 RX ADMIN — ISOSORBIDE DINITRATE 10 MILLIGRAM(S): 5 TABLET ORAL at 05:44

## 2018-03-21 RX ADMIN — ATORVASTATIN CALCIUM 20 MILLIGRAM(S): 80 TABLET, FILM COATED ORAL at 23:08

## 2018-03-21 RX ADMIN — PENICILLIN G POTASSIUM 100 MILLION UNIT(S): 5000000 POWDER, FOR SOLUTION INTRAMUSCULAR; INTRAPLEURAL; INTRATHECAL; INTRAVENOUS at 06:00

## 2018-03-21 RX ADMIN — Medication 27.5 MILLIGRAM(S): at 05:44

## 2018-03-21 RX ADMIN — HEPARIN SODIUM 5000 UNIT(S): 5000 INJECTION INTRAVENOUS; SUBCUTANEOUS at 17:27

## 2018-03-21 RX ADMIN — Medication 27.5 MILLIGRAM(S): at 21:53

## 2018-03-21 RX ADMIN — Medication 81 MILLIGRAM(S): at 12:02

## 2018-03-21 RX ADMIN — INSULIN GLARGINE 40 UNIT(S): 100 INJECTION, SOLUTION SUBCUTANEOUS at 21:52

## 2018-03-21 RX ADMIN — Medication 2: at 23:10

## 2018-03-21 RX ADMIN — PENICILLIN G POTASSIUM 100 MILLION UNIT(S): 5000000 POWDER, FOR SOLUTION INTRAMUSCULAR; INTRAPLEURAL; INTRATHECAL; INTRAVENOUS at 09:43

## 2018-03-21 RX ADMIN — PENICILLIN G POTASSIUM 100 MILLION UNIT(S): 5000000 POWDER, FOR SOLUTION INTRAMUSCULAR; INTRAPLEURAL; INTRATHECAL; INTRAVENOUS at 17:28

## 2018-03-21 RX ADMIN — LEVETIRACETAM 1500 MILLIGRAM(S): 250 TABLET, FILM COATED ORAL at 05:43

## 2018-03-21 RX ADMIN — PENICILLIN G POTASSIUM 100 MILLION UNIT(S): 5000000 POWDER, FOR SOLUTION INTRAMUSCULAR; INTRAPLEURAL; INTRATHECAL; INTRAVENOUS at 13:43

## 2018-03-21 RX ADMIN — Medication 27.5 MILLIGRAM(S): at 13:43

## 2018-03-21 RX ADMIN — HEPARIN SODIUM 5000 UNIT(S): 5000 INJECTION INTRAVENOUS; SUBCUTANEOUS at 05:42

## 2018-03-21 NOTE — PROGRESS NOTE ADULT - PROBLEM SELECTOR PLAN 1
Toxic metabolic encephalopathy and now with stroke, PLEDS on EEG  On PCN for neurosyphilis as possible etiology, awaiting repeat RPR  Continue to monitor serial neuro exams  Repeat head CT yesterday demonstrated new Left PCA infarct   HOB > 30 deg for aspiration precautions Toxic metabolic encephalopathy and now with stroke, PLEDS on EEG  On PCN for neurosyphilis as possible etiology, awaiting repeat RPR  Continue to monitor serial neuro exams  Repeat head CT 3/19 demonstrated new Left PCA infarct   HOB > 30 deg for aspiration precautions

## 2018-03-21 NOTE — PROGRESS NOTE ADULT - PROBLEM SELECTOR PLAN 3
-being treated for possible syphilis with PEG G. otherwise workup has really been indeterminate other than acute stroke.

## 2018-03-21 NOTE — PROGRESS NOTE ADULT - SUBJECTIVE AND OBJECTIVE BOX
Patient is a 73y old  Male who presents with a chief complaint of Unresponsive (10 Mar 2018 05:56)      BRIEF HOSPITAL COURSE: 74 y/o M with a h/o DM, CHF, HTN, CKD, shingles over the past 3 months, who presented BIBA after being found unresponsive by wife at home. Upon arrival to ED, /110, patient was unresponsive to verbal stimuli, and was intubated by Anesthesia for concerns of inability to protect airway and hypoxia. Patient was noted to have L cheek focal twitching, withdrew to noxious stimuli x 4 extremities, however had a deficit on LUE / LLE. Initial CT head negative for acute hemorrhage or infarct, no visible MCA sign. Deemed not a candidate for tPA. Later developed tonic-clonic seizure activity. EEG performed without seizure activity noted. Concern for meningoencephalitis, however, LP unremarkable. Extubated on 3/11.      Events last 24 hours: MS slowly improving, still poor control of secretions    PAST MEDICAL & SURGICAL HISTORY:  CHF (congestive heart failure)  HTN (hypertension)  Diabetes  No significant past surgical history      Review of Systems:  limited due to mental status      Medications:  penicillin   G  potassium  IVPB      penicillin   G  potassium  IVPB 3 Million Unit(s) IV Intermittent every 4 hours    carvedilol 6.25 milliGRAM(s) Oral every 12 hours  furosemide    Tablet 40 milliGRAM(s) Oral two times a day  isosorbide   dinitrate Tablet (ISORDIL) 10 milliGRAM(s) Oral three times a day    ALBUTerol    90 MICROgram(s) HFA Inhaler 2 Puff(s) Inhalation every 6 hours PRN    levETIRAcetam  Solution 1500 milliGRAM(s) Oral two times a day  valproate sodium IVPB 500 milliGRAM(s) IV Intermittent every 8 hours      aspirin  chewable 81 milliGRAM(s) Oral daily  heparin  Injectable 5000 Unit(s) SubCutaneous every 12 hours        atorvastatin 20 milliGRAM(s) Oral at bedtime  dextrose 50% Injectable 12.5 Gram(s) IV Push once  dextrose 50% Injectable 25 Gram(s) IV Push once  dextrose 50% Injectable 25 Gram(s) IV Push once  dextrose Gel 1 Dose(s) Oral once PRN  finasteride 5 milliGRAM(s) Oral daily  glucagon  Injectable 1 milliGRAM(s) IntraMuscular once PRN  insulin glargine Injectable (LANTUS) 40 Unit(s) SubCutaneous two times a day  insulin lispro (HumaLOG) corrective regimen sliding scale   SubCutaneous Before meals and at bedtime    dextrose 5%. 1000 milliLiter(s) IV Continuous <Continuous>                ICU Vital Signs Last 24 Hrs  T(C): 37.7 (21 Mar 2018 08:00), Max: 37.7 (21 Mar 2018 04:00)  T(F): 99.8 (21 Mar 2018 08:00), Max: 99.8 (21 Mar 2018 04:00)  HR: 91 (21 Mar 2018 12:00) (86 - 101)  BP: 122/84 (21 Mar 2018 12:00) (119/59 - 158/75)  BP(mean): 100 (21 Mar 2018 12:00) (83 - 108)  ABP: --  ABP(mean): --  RR: 20 (21 Mar 2018 12:00) (12 - 30)  SpO2: 98% (21 Mar 2018 12:00) (93% - 100%)          I&O's Detail    20 Mar 2018 07:01  -  21 Mar 2018 07:00  --------------------------------------------------------  IN:    Free Water: 400 mL    Glucerna: 1290 mL    Solution: 150 mL    Solution: 500 mL  Total IN: 2340 mL    OUT:    Incontinent per Condom Catheter: 250 mL    Indwelling Catheter - Urethral: 450 mL    Other: 100 mL  Total OUT: 800 mL    Total NET: 1540 mL      21 Mar 2018 07:01  -  21 Mar 2018 12:18  --------------------------------------------------------  IN:    Free Water: 200 mL  Total IN: 200 mL    OUT:  Total OUT: 0 mL    Total NET: 200 mL            LABS:                        12.9   14.9  )-----------( 337      ( 21 Mar 2018 06:01 )             41.1     03-21    145  |  100  |  67.0<H>  ----------------------------<  108  4.9   |  33.0<H>  |  2.33<H>    Ca    9.3      21 Mar 2018 06:01  Phos  3.0     03-21  Mg     2.5     03-21            CAPILLARY BLOOD GLUCOSE      POCT Blood Glucose.: 121 mg/dL (21 Mar 2018 12:00)    PT/INR - ( 21 Mar 2018 06:01 )   PT: 13.2 sec;   INR: 1.20 ratio         PTT - ( 21 Mar 2018 06:01 )  PTT:24.1 sec    CULTURES:  Culture Results:   No growth at 48 hours (03-17-18 @ 10:21)  Culture Results:   No growth at 48 hours (03-17-18 @ 10:20)      Physical Examination:    General: No acute distress.  mumbling words    HEENT: Pupils equal, reactive to light.  Symmetric.    PULM: Course and diminished    CVS: Regular rate and rhythm, no murmurs, rubs, or gallops    ABD: Soft, nondistended, nontender, normoactive bowel sounds, no masses    EXT: No edema, nontender    SKIN: Warm and well perfused, no rashes noted.    RADIOLOGY: < from: Xray Chest 1 View- PORTABLE-Urgent (03.21.18 @ 09:03) >   EXAM:  XR CHEST PORTABLE URGENT 1V                          PROCEDURE DATE:  03/21/2018          INTERPRETATION:  History: Cough. Possible aspiration    Technique:  AP portable    Comparisons:  Chest x-ray dated 3/16/2018    Findings:     No acuteinfiltrates, congestion or pleural effusions  .    Nasogastric tube in stomach. No change in pacemaker wires in right atrium   and ventricle.  The pulmonary vasculature and aorta are normal for age.   Heart size is unremarkable.     The thorax is normal for age.    Impression: No acute pulmonary disease. Pacemaker.    No interval change                FRANCISCA VAZQUEZ M.D., ATTENDING RADIOLOGIST  This document has been electronically signed. Mar 21 2018  9:14AM

## 2018-03-21 NOTE — PROGRESS NOTE ADULT - PROBLEM SELECTOR PLAN 5
- currently protecting airway but given significant secretions, poor mental state, dysphagia, patient at high risk for aspiration, respiratory compromise, intubation. wife is aware. no one at bedside at this time but will continue to follow for goals of care. patient likely to at minimum require PEG tube.

## 2018-03-21 NOTE — PROGRESS NOTE ADULT - ATTENDING COMMENTS
Thank you for the opportunity to assist with the care of this patient.   [facility] [phone].

## 2018-03-21 NOTE — PROGRESS NOTE ADULT - PROBLEM SELECTOR PLAN 6
Increased fi02 requirment today  Possibly aspirating  NT suctioning as needed  Keep HOB elevated  Chest PT stable  Possibly aspirating  NT suctioning as needed  Keep HOB elevated  Chest PT

## 2018-03-21 NOTE — PROGRESS NOTE ADULT - SUBJECTIVE AND OBJECTIVE BOX
[facility]                                     Neurology at [place]                                 [facility]                                  [address]                                        [place], [place], [address]                                             [phone]      Vital signs:  T(C): 37.2 (03-21-18 @ 12:01), Max: 37.7 (03-21-18 @ 04:00)  HR: 89 (03-21-18 @ 13:00) (86 - 101)  BP: 126/63 (03-21-18 @ 13:00) (119/59 - 158/75)  RR: 32 (03-21-18 @ 13:00) (12 - 32)  SpO2: 99% (03-21-18 @ 13:00) (93% - 100%)  Wt(kg): --    Exam:    No new complaints.  lethargic  not following commands  perrl  face grossly sym  right sided weakness moves left spont.  grimace to pinch left>right    CT head- acute left PCA ( temp/occ lobes) CVA

## 2018-03-21 NOTE — PROGRESS NOTE ADULT - SUBJECTIVE AND OBJECTIVE BOX
OVERNIGHT EVENTS: 73M with acute L PCA infarct, persistent encephalopathy, concerns for encephalitis, PLEDs, copious secretions concerns for aspiration, dysphagia with NG tube, a little more responsive today but still with significant secretions, dysphagia, and lethargy.     Present Symptoms:     Dyspnea: 0  Nausea/Vomiting: no   Anxiety:  unable   Depression: unable   Fatigue: yes  Loss of appetite: unable     Pain: none            Character-            Duration-            Effect-            Factors-            Frequency-            Location-            Severity-    Review of Systems: Reviewed                   Unable to obtain due to poor mentation   All others negative    MEDICATIONS  (STANDING):  aspirin  chewable 81 milliGRAM(s) Oral daily  atorvastatin 20 milliGRAM(s) Oral at bedtime  carvedilol 6.25 milliGRAM(s) Oral every 12 hours  dextrose 5%. 1000 milliLiter(s) (50 mL/Hr) IV Continuous <Continuous>  dextrose 50% Injectable 12.5 Gram(s) IV Push once  dextrose 50% Injectable 25 Gram(s) IV Push once  dextrose 50% Injectable 25 Gram(s) IV Push once  finasteride 5 milliGRAM(s) Oral daily  furosemide   Injectable 40 milliGRAM(s) IV Push daily  heparin  Injectable 5000 Unit(s) SubCutaneous every 12 hours  insulin glargine Injectable (LANTUS) 40 Unit(s) SubCutaneous two times a day  insulin lispro (HumaLOG) corrective regimen sliding scale   SubCutaneous Before meals and at bedtime  isosorbide   dinitrate Tablet (ISORDIL) 10 milliGRAM(s) Oral three times a day  levETIRAcetam  Solution 1500 milliGRAM(s) Oral two times a day  penicillin   G  potassium  IVPB      penicillin   G  potassium  IVPB 3 Million Unit(s) IV Intermittent every 4 hours  valproate sodium IVPB 500 milliGRAM(s) IV Intermittent every 8 hours    MEDICATIONS  (PRN):  ALBUTerol    90 MICROgram(s) HFA Inhaler 2 Puff(s) Inhalation every 6 hours PRN Shortness of Breath  dextrose Gel 1 Dose(s) Oral once PRN Blood Glucose LESS THAN 70 milliGRAM(s)/deciLiter  glucagon  Injectable 1 milliGRAM(s) IntraMuscular once PRN Glucose <70 milliGRAM(s)/deciLiter    PHYSICAL EXAM:    Vital Signs Last 24 Hrs  T(C): 37.7 (21 Mar 2018 08:00), Max: 37.7 (21 Mar 2018 04:00)  T(F): 99.8 (21 Mar 2018 08:00), Max: 99.8 (21 Mar 2018 04:00)  HR: 87 (21 Mar 2018 09:00) (86 - 101)  BP: 148/71 (21 Mar 2018 09:00) (119/59 - 158/75)  BP(mean): 102 (21 Mar 2018 09:00) (83 - 111)  RR: 22 (21 Mar 2018 09:00) (12 - 31)  SpO2: 98% (21 Mar 2018 09:00) (93% - 100%)    General: lethargic but wakes up to verbal stimuli     Karnofsky:  20-30 %    HEENT: normal      Lungs: comfortable    CV: normal      GI: NG tube     : incontinent      MSK: right hemiparesis     Skin: no rash    LABS:                      12.9   14.9  )-----------( 337      ( 21 Mar 2018 06:01 )             41.1   03-21    145  |  100  |  67.0<H>  ----------------------------<  108  4.9   |  33.0<H>  |  2.33<H>    Ca    9.3      21 Mar 2018 06:01  Phos  3.0     03-21  Mg     2.5     03-21    PT/INR - ( 21 Mar 2018 06:01 )   PT: 13.2 sec;   INR: 1.20 ratio       PTT - ( 21 Mar 2018 06:01 )  PTT:24.1 sec    I&O's Summary    20 Mar 2018 07:01  -  21 Mar 2018 07:00  --------------------------------------------------------  IN: 2340 mL / OUT: 800 mL / NET: 1540 mL    21 Mar 2018 07:01  -  21 Mar 2018 09:18  --------------------------------------------------------  IN: 200 mL / OUT: 0 mL / NET: 200 mL    RADIOLOGY & ADDITIONAL STUDIES:    < from: Xray Chest 1 View- PORTABLE-Urgent (03.21.18 @ 09:03) >  No acute pulmonary disease. Pacemaker. No interval change    ADVANCE DIRECTIVES: Full Code

## 2018-03-21 NOTE — PROGRESS NOTE ADULT - PROBLEM SELECTOR PLAN 7
new L PCA infarct on CT yesterday afternoon  ASA/Statin  Neurology following  Family updated by Dr Hair new L PCA infarct on CT, TTE reviewed on appropriate therapy  ASA/Statin  Neurology following

## 2018-03-22 LAB
ALBUMIN SERPL ELPH-MCNC: 2.5 G/DL — LOW (ref 3.3–5.2)
ALP SERPL-CCNC: 118 U/L — SIGNIFICANT CHANGE UP (ref 40–120)
ALT FLD-CCNC: 19 U/L — SIGNIFICANT CHANGE UP
ANION GAP SERPL CALC-SCNC: 10 MMOL/L — SIGNIFICANT CHANGE UP (ref 5–17)
AST SERPL-CCNC: 45 U/L — HIGH
BILIRUB SERPL-MCNC: 0.8 MG/DL — SIGNIFICANT CHANGE UP (ref 0.4–2)
BUN SERPL-MCNC: 77 MG/DL — HIGH (ref 8–20)
CALCIUM SERPL-MCNC: 9.5 MG/DL — SIGNIFICANT CHANGE UP (ref 8.6–10.2)
CHLORIDE SERPL-SCNC: 101 MMOL/L — SIGNIFICANT CHANGE UP (ref 98–107)
CO2 SERPL-SCNC: 38 MMOL/L — HIGH (ref 22–29)
CREAT SERPL-MCNC: 2.29 MG/DL — HIGH (ref 0.5–1.3)
CULTURE RESULTS: SIGNIFICANT CHANGE UP
CULTURE RESULTS: SIGNIFICANT CHANGE UP
GLUCOSE BLDC GLUCOMTR-MCNC: 152 MG/DL — HIGH (ref 70–99)
GLUCOSE BLDC GLUCOMTR-MCNC: 177 MG/DL — HIGH (ref 70–99)
GLUCOSE BLDC GLUCOMTR-MCNC: 224 MG/DL — HIGH (ref 70–99)
GLUCOSE BLDC GLUCOMTR-MCNC: 264 MG/DL — HIGH (ref 70–99)
GLUCOSE SERPL-MCNC: 158 MG/DL — HIGH (ref 70–115)
HCT VFR BLD CALC: 41 % — LOW (ref 42–52)
HGB BLD-MCNC: 13 G/DL — LOW (ref 14–18)
MAGNESIUM SERPL-MCNC: 2.6 MG/DL — SIGNIFICANT CHANGE UP (ref 1.6–2.6)
MCHC RBC-ENTMCNC: 29.7 PG — SIGNIFICANT CHANGE UP (ref 27–31)
MCHC RBC-ENTMCNC: 31.7 G/DL — LOW (ref 32–36)
MCV RBC AUTO: 93.6 FL — SIGNIFICANT CHANGE UP (ref 80–94)
PHOSPHATE SERPL-MCNC: 3 MG/DL — SIGNIFICANT CHANGE UP (ref 2.4–4.7)
PLATELET # BLD AUTO: 334 K/UL — SIGNIFICANT CHANGE UP (ref 150–400)
POTASSIUM SERPL-MCNC: 5.1 MMOL/L — SIGNIFICANT CHANGE UP (ref 3.5–5.3)
POTASSIUM SERPL-SCNC: 5.1 MMOL/L — SIGNIFICANT CHANGE UP (ref 3.5–5.3)
PROT SERPL-MCNC: 7.4 G/DL — SIGNIFICANT CHANGE UP (ref 6.6–8.7)
RBC # BLD: 4.38 M/UL — LOW (ref 4.6–6.2)
RBC # FLD: 15.9 % — HIGH (ref 11–15.6)
SODIUM SERPL-SCNC: 149 MMOL/L — HIGH (ref 135–145)
SPECIMEN SOURCE: SIGNIFICANT CHANGE UP
SPECIMEN SOURCE: SIGNIFICANT CHANGE UP
WBC # BLD: 14.3 K/UL — HIGH (ref 4.8–10.8)
WBC # FLD AUTO: 14.3 K/UL — HIGH (ref 4.8–10.8)

## 2018-03-22 PROCEDURE — 99231 SBSQ HOSP IP/OBS SF/LOW 25: CPT

## 2018-03-22 PROCEDURE — 99291 CRITICAL CARE FIRST HOUR: CPT

## 2018-03-22 PROCEDURE — 99233 SBSQ HOSP IP/OBS HIGH 50: CPT

## 2018-03-22 RX ORDER — ACETAZOLAMIDE 250 MG/1
250 TABLET ORAL ONCE
Qty: 0 | Refills: 0 | Status: COMPLETED | OUTPATIENT
Start: 2018-03-22 | End: 2018-03-22

## 2018-03-22 RX ORDER — METOCLOPRAMIDE HCL 10 MG
10 TABLET ORAL ONCE
Qty: 0 | Refills: 0 | Status: COMPLETED | OUTPATIENT
Start: 2018-03-22 | End: 2018-03-22

## 2018-03-22 RX ADMIN — PENICILLIN G POTASSIUM 100 MILLION UNIT(S): 5000000 POWDER, FOR SOLUTION INTRAMUSCULAR; INTRAPLEURAL; INTRATHECAL; INTRAVENOUS at 02:00

## 2018-03-22 RX ADMIN — Medication 2: at 06:24

## 2018-03-22 RX ADMIN — INSULIN GLARGINE 40 UNIT(S): 100 INJECTION, SOLUTION SUBCUTANEOUS at 09:14

## 2018-03-22 RX ADMIN — ACETAZOLAMIDE 105 MILLIGRAM(S): 250 TABLET ORAL at 10:05

## 2018-03-22 RX ADMIN — LEVETIRACETAM 1500 MILLIGRAM(S): 250 TABLET, FILM COATED ORAL at 17:27

## 2018-03-22 RX ADMIN — Medication 81 MILLIGRAM(S): at 11:45

## 2018-03-22 RX ADMIN — INSULIN GLARGINE 40 UNIT(S): 100 INJECTION, SOLUTION SUBCUTANEOUS at 22:08

## 2018-03-22 RX ADMIN — PENICILLIN G POTASSIUM 100 MILLION UNIT(S): 5000000 POWDER, FOR SOLUTION INTRAMUSCULAR; INTRAPLEURAL; INTRATHECAL; INTRAVENOUS at 17:29

## 2018-03-22 RX ADMIN — Medication 2: at 11:47

## 2018-03-22 RX ADMIN — Medication 40 MILLIGRAM(S): at 17:28

## 2018-03-22 RX ADMIN — HEPARIN SODIUM 5000 UNIT(S): 5000 INJECTION INTRAVENOUS; SUBCUTANEOUS at 17:28

## 2018-03-22 RX ADMIN — ATORVASTATIN CALCIUM 20 MILLIGRAM(S): 80 TABLET, FILM COATED ORAL at 22:09

## 2018-03-22 RX ADMIN — HEPARIN SODIUM 5000 UNIT(S): 5000 INJECTION INTRAVENOUS; SUBCUTANEOUS at 06:22

## 2018-03-22 RX ADMIN — CARVEDILOL PHOSPHATE 6.25 MILLIGRAM(S): 80 CAPSULE, EXTENDED RELEASE ORAL at 17:28

## 2018-03-22 RX ADMIN — PENICILLIN G POTASSIUM 100 MILLION UNIT(S): 5000000 POWDER, FOR SOLUTION INTRAMUSCULAR; INTRAPLEURAL; INTRATHECAL; INTRAVENOUS at 06:27

## 2018-03-22 RX ADMIN — ISOSORBIDE DINITRATE 10 MILLIGRAM(S): 5 TABLET ORAL at 06:22

## 2018-03-22 RX ADMIN — ISOSORBIDE DINITRATE 10 MILLIGRAM(S): 5 TABLET ORAL at 22:11

## 2018-03-22 RX ADMIN — PENICILLIN G POTASSIUM 100 MILLION UNIT(S): 5000000 POWDER, FOR SOLUTION INTRAMUSCULAR; INTRAPLEURAL; INTRATHECAL; INTRAVENOUS at 09:14

## 2018-03-22 RX ADMIN — CARVEDILOL PHOSPHATE 6.25 MILLIGRAM(S): 80 CAPSULE, EXTENDED RELEASE ORAL at 06:22

## 2018-03-22 RX ADMIN — PENICILLIN G POTASSIUM 100 MILLION UNIT(S): 5000000 POWDER, FOR SOLUTION INTRAMUSCULAR; INTRAPLEURAL; INTRATHECAL; INTRAVENOUS at 22:08

## 2018-03-22 RX ADMIN — Medication 27.5 MILLIGRAM(S): at 14:30

## 2018-03-22 RX ADMIN — PENICILLIN G POTASSIUM 100 MILLION UNIT(S): 5000000 POWDER, FOR SOLUTION INTRAMUSCULAR; INTRAPLEURAL; INTRATHECAL; INTRAVENOUS at 14:30

## 2018-03-22 RX ADMIN — Medication 6: at 17:30

## 2018-03-22 RX ADMIN — FINASTERIDE 5 MILLIGRAM(S): 5 TABLET, FILM COATED ORAL at 11:45

## 2018-03-22 RX ADMIN — ISOSORBIDE DINITRATE 10 MILLIGRAM(S): 5 TABLET ORAL at 14:30

## 2018-03-22 RX ADMIN — Medication 4: at 22:09

## 2018-03-22 RX ADMIN — Medication 27.5 MILLIGRAM(S): at 22:09

## 2018-03-22 RX ADMIN — LEVETIRACETAM 1500 MILLIGRAM(S): 250 TABLET, FILM COATED ORAL at 06:21

## 2018-03-22 RX ADMIN — Medication 40 MILLIGRAM(S): at 06:21

## 2018-03-22 RX ADMIN — Medication 10 MILLIGRAM(S): at 23:03

## 2018-03-22 RX ADMIN — Medication 27.5 MILLIGRAM(S): at 06:25

## 2018-03-22 NOTE — PROGRESS NOTE ADULT - SUBJECTIVE AND OBJECTIVE BOX
[facility]                                     Neurology at [place]                                 [facility]                                  [address]                                        [place], [place], [address]                                             [phone]      Vital signs:  T(C): 36.9 (03-22-18 @ 16:01), Max: 37.7 (03-22-18 @ 08:30)  HR: 95 (03-22-18 @ 16:00) (86 - 97)  BP: 133/65 (03-22-18 @ 16:00) (117/64 - 154/112)  RR: 20 (03-22-18 @ 16:00) (14 - 32)  SpO2: 96% (03-22-18 @ 16:00) (93% - 100%)  Wt(kg): --    Exam:    Arouses somewhat to sternal rub and opens eyes  Not following commands  perrl  face mild right facial  right HP, moves left spont  mild grimace to nailbed pressure

## 2018-03-22 NOTE — PROGRESS NOTE ADULT - SUBJECTIVE AND OBJECTIVE BOX
Patient is a 73y old  Male who presents with a chief complaint of Unresponsive (10 Mar 2018 05:56)      BRIEF HOSPITAL COURSE: 74 y/o M with a h/o DM, CHF, HTN, CKD, shingles over the past 3 months, who presented BIBA after being found unresponsive by wife at home. Upon arrival to ED, /110, patient was unresponsive to verbal stimuli, and was intubated by Anesthesia for concerns of inability to protect airway and hypoxia. Patient was noted to have L cheek focal twitching, withdrew to noxious stimuli x 4 extremities, however had a deficit on LUE / LLE. Initial CT head negative for acute hemorrhage or infarct, no visible MCA sign. Deemed not a candidate for tPA. Later developed tonic-clonic seizure activity. EEG performed without seizure activity noted. Concern for meningoencephalitis, however, LP unremarkable. Extubated on 3/11.    Events last 24 hours:     PAST MEDICAL & SURGICAL HISTORY:  CHF (congestive heart failure)  HTN (hypertension)  Diabetes  No significant past surgical history      Review of Systems:  CONSTITUTIONAL: No fever, chills, or fatigue  EYES: No eye pain, visual disturbances, or discharge  ENMT:  No difficulty hearing, tinnitus, vertigo; No sinus or throat pain  NECK: No pain or stiffness  RESPIRATORY: No cough, wheezing, chills or hemoptysis; No shortness of breath  CARDIOVASCULAR: No chest pain, palpitations, dizziness, or leg swelling  GASTROINTESTINAL: No abdominal or epigastric pain. No nausea, vomiting, or hematemesis; No diarrhea or constipation. No melena or hematochezia.  GENITOURINARY: No dysuria, frequency, hematuria, or incontinence  NEUROLOGICAL: No headaches, memory loss, loss of strength, numbness, or tremors  SKIN: No itching, burning, rashes, or lesions   MUSCULOSKELETAL: No joint pain or swelling; No muscle, back, or extremity pain  PSYCHIATRIC: No depression, anxiety, mood swings, or difficulty sleeping      Medications:  penicillin   G  potassium  IVPB      penicillin   G  potassium  IVPB 3 Million Unit(s) IV Intermittent every 4 hours    carvedilol 6.25 milliGRAM(s) Oral every 12 hours  furosemide    Tablet 40 milliGRAM(s) Oral two times a day  isosorbide   dinitrate Tablet (ISORDIL) 10 milliGRAM(s) Oral three times a day    ALBUTerol    90 MICROgram(s) HFA Inhaler 2 Puff(s) Inhalation every 6 hours PRN    levETIRAcetam  Solution 1500 milliGRAM(s) Oral two times a day  valproate sodium IVPB 500 milliGRAM(s) IV Intermittent every 8 hours      aspirin  chewable 81 milliGRAM(s) Oral daily  heparin  Injectable 5000 Unit(s) SubCutaneous every 12 hours        atorvastatin 20 milliGRAM(s) Oral at bedtime  dextrose 50% Injectable 12.5 Gram(s) IV Push once  dextrose 50% Injectable 25 Gram(s) IV Push once  dextrose 50% Injectable 25 Gram(s) IV Push once  dextrose Gel 1 Dose(s) Oral once PRN  finasteride 5 milliGRAM(s) Oral daily  glucagon  Injectable 1 milliGRAM(s) IntraMuscular once PRN  insulin glargine Injectable (LANTUS) 40 Unit(s) SubCutaneous two times a day  insulin lispro (HumaLOG) corrective regimen sliding scale   SubCutaneous Before meals and at bedtime    dextrose 5%. 1000 milliLiter(s) IV Continuous <Continuous>                ICU Vital Signs Last 24 Hrs  T(C): 37.1 (22 Mar 2018 04:01), Max: 37.2 (21 Mar 2018 12:01)  T(F): 98.7 (22 Mar 2018 04:01), Max: 98.9 (21 Mar 2018 12:01)  HR: 92 (22 Mar 2018 10:00) (86 - 97)  BP: 130/63 (22 Mar 2018 10:00) (117/64 - 166/99)  BP(mean): 90 (22 Mar 2018 10:00) (84 - 118)  ABP: --  ABP(mean): --  RR: 25 (22 Mar 2018 10:00) (14 - 32)  SpO2: 99% (22 Mar 2018 10:00) (93% - 100%)          I&O's Detail    21 Mar 2018 07:01  -  22 Mar 2018 07:00  --------------------------------------------------------  IN:    Free Water: 600 mL    Glucerna: 1200 mL    Solution: 50 mL    Solution: 600 mL    Solution: 100 mL  Total IN: 2550 mL    OUT:    Incontinent per Condom Catheter: 1950 mL  Total OUT: 1950 mL    Total NET: 600 mL      22 Mar 2018 07:01  -  22 Mar 2018 10:32  --------------------------------------------------------  IN:    Free Water: 200 mL  Total IN: 200 mL    OUT:  Total OUT: 0 mL    Total NET: 200 mL            LABS:                        13.0   14.3  )-----------( 334      ( 22 Mar 2018 05:39 )             41.0     03-22    149<H>  |  101  |  77.0<H>  ----------------------------<  158<H>  5.1   |  38.0<H>  |  2.29<H>    Ca    9.5      22 Mar 2018 05:39  Phos  3.0     03-22  Mg     2.6     03-22    TPro  7.4  /  Alb  2.5<L>  /  TBili  0.8  /  DBili  x   /  AST  45<H>  /  ALT  19  /  AlkPhos  118  03-22          CAPILLARY BLOOD GLUCOSE      POCT Blood Glucose.: 152 mg/dL (22 Mar 2018 06:23)    PT/INR - ( 21 Mar 2018 06:01 )   PT: 13.2 sec;   INR: 1.20 ratio         PTT - ( 21 Mar 2018 06:01 )  PTT:24.1 sec    CULTURES:  Culture Results:   No growth at 48 hours (03-17-18 @ 10:21)  Culture Results:   No growth at 48 hours (03-17-18 @ 10:20)      Physical Examination:    General: No acute distress.  Alert, oriented, interactive, nonfocal    HEENT: Pupils equal, reactive to light.  Symmetric.    PULM: Clear to auscultation bilaterally, no significant sputum production    CVS: Regular rate and rhythm, no murmurs, rubs, or gallops    ABD: Soft, nondistended, nontender, normoactive bowel sounds, no masses    EXT: No edema, nontender    SKIN: Warm and well perfused, no rashes noted.    RADIOLOGY: ***    CRITICAL CARE TIME SPENT: *** Patient is a 73y old  Male who presents with a chief complaint of Unresponsive (10 Mar 2018 05:56)      BRIEF HOSPITAL COURSE: 74 y/o M with a h/o DM, CHF, HTN, CKD, shingles over the past 3 months, who presented BIBA after being found unresponsive by wife at home. Upon arrival to ED, /110, patient was unresponsive to verbal stimuli, and was intubated by Anesthesia for concerns of inability to protect airway and hypoxia. Patient was noted to have L cheek focal twitching, withdrew to noxious stimuli x 4 extremities, however had a deficit on LUE / LLE. Initial CT head negative for acute hemorrhage or infarct, no visible MCA sign. Deemed not a candidate for tPA. Later developed tonic-clonic seizure activity. EEG performed without seizure activity noted. Concern for meningoencephalitis, however, LP unremarkable. Extubated on 3/11.    Events last 24 hours: slight improvement in mental status, unable to do swallow evaluation    PAST MEDICAL & SURGICAL HISTORY:  CHF (congestive heart failure)  HTN (hypertension)  Diabetes  No significant past surgical history      Review of Systems:  unable to obtain      Medications:  penicillin   G  potassium  IVPB      penicillin   G  potassium  IVPB 3 Million Unit(s) IV Intermittent every 4 hours    carvedilol 6.25 milliGRAM(s) Oral every 12 hours  furosemide    Tablet 40 milliGRAM(s) Oral two times a day  isosorbide   dinitrate Tablet (ISORDIL) 10 milliGRAM(s) Oral three times a day    ALBUTerol    90 MICROgram(s) HFA Inhaler 2 Puff(s) Inhalation every 6 hours PRN    levETIRAcetam  Solution 1500 milliGRAM(s) Oral two times a day  valproate sodium IVPB 500 milliGRAM(s) IV Intermittent every 8 hours      aspirin  chewable 81 milliGRAM(s) Oral daily  heparin  Injectable 5000 Unit(s) SubCutaneous every 12 hours        atorvastatin 20 milliGRAM(s) Oral at bedtime  dextrose 50% Injectable 12.5 Gram(s) IV Push once  dextrose 50% Injectable 25 Gram(s) IV Push once  dextrose 50% Injectable 25 Gram(s) IV Push once  dextrose Gel 1 Dose(s) Oral once PRN  finasteride 5 milliGRAM(s) Oral daily  glucagon  Injectable 1 milliGRAM(s) IntraMuscular once PRN  insulin glargine Injectable (LANTUS) 40 Unit(s) SubCutaneous two times a day  insulin lispro (HumaLOG) corrective regimen sliding scale   SubCutaneous Before meals and at bedtime    dextrose 5%. 1000 milliLiter(s) IV Continuous <Continuous>                ICU Vital Signs Last 24 Hrs  T(C): 37.1 (22 Mar 2018 04:01), Max: 37.2 (21 Mar 2018 12:01)  T(F): 98.7 (22 Mar 2018 04:01), Max: 98.9 (21 Mar 2018 12:01)  HR: 92 (22 Mar 2018 10:00) (86 - 97)  BP: 130/63 (22 Mar 2018 10:00) (117/64 - 166/99)  BP(mean): 90 (22 Mar 2018 10:00) (84 - 118)  ABP: --  ABP(mean): --  RR: 25 (22 Mar 2018 10:00) (14 - 32)  SpO2: 99% (22 Mar 2018 10:00) (93% - 100%)          I&O's Detail    21 Mar 2018 07:01  -  22 Mar 2018 07:00  --------------------------------------------------------  IN:    Free Water: 600 mL    Glucerna: 1200 mL    Solution: 50 mL    Solution: 600 mL    Solution: 100 mL  Total IN: 2550 mL    OUT:    Incontinent per Condom Catheter: 1950 mL  Total OUT: 1950 mL    Total NET: 600 mL      22 Mar 2018 07:01  -  22 Mar 2018 10:32  --------------------------------------------------------  IN:    Free Water: 200 mL  Total IN: 200 mL    OUT:  Total OUT: 0 mL    Total NET: 200 mL            LABS:                        13.0   14.3  )-----------( 334      ( 22 Mar 2018 05:39 )             41.0     03-22    149<H>  |  101  |  77.0<H>  ----------------------------<  158<H>  5.1   |  38.0<H>  |  2.29<H>    Ca    9.5      22 Mar 2018 05:39  Phos  3.0     03-22  Mg     2.6     03-22    TPro  7.4  /  Alb  2.5<L>  /  TBili  0.8  /  DBili  x   /  AST  45<H>  /  ALT  19  /  AlkPhos  118  03-22          CAPILLARY BLOOD GLUCOSE      POCT Blood Glucose.: 152 mg/dL (22 Mar 2018 06:23)    PT/INR - ( 21 Mar 2018 06:01 )   PT: 13.2 sec;   INR: 1.20 ratio         PTT - ( 21 Mar 2018 06:01 )  PTT:24.1 sec    CULTURES:  Culture Results:   No growth at 48 hours (03-17-18 @ 10:21)  Culture Results:   No growth at 48 hours (03-17-18 @ 10:20)      Physical Examination:    General: No acute distress.  will mumble but not following commands    HEENT: Pupils equal, reactive to light.  Symmetric.    PULM: course and gurgly    CVS: Regular rate and rhythm, no murmurs, rubs, or gallops    ABD: Soft, nondistended, nontender, normoactive bowel sounds, no masses    EXT: No edema, nontender    SKIN: Warm and well perfused, no rashes noted.    RADIOLOGY: < from: Xray Chest 1 View- PORTABLE-Urgent (03.21.18 @ 09:03) >  EXAM:  XR CHEST PORTABLE URGENT 1V                          PROCEDURE DATE:  03/21/2018          INTERPRETATION:  History: Cough. Possible aspiration    Technique:  AP portable    Comparisons:  Chest x-ray dated 3/16/2018    Findings:     No acuteinfiltrates, congestion or pleural effusions  .    Nasogastric tube in stomach. No change in pacemaker wires in right atrium   and ventricle.  The pulmonary vasculature and aorta are normal for age.   Heart size is unremarkable.     The thorax is normal for age.    Impression: No acute pulmonary disease. Pacemaker.    No interval change    FRANCISCA VAZQUEZ M.D., ATTENDING RADIOLOGIST

## 2018-03-22 NOTE — PROGRESS NOTE ADULT - PROBLEM SELECTOR PLAN 1
Toxic metabolic encephalopathy and now with stroke, PLEDS on EEG  On PCN for neurosyphilis as possible etiology, awaiting repeat RPR  Continue to monitor serial neuro exams  Repeat head CT 3/19 demonstrated new Left PCA infarct   HOB > 30 deg for aspiration precautions

## 2018-03-22 NOTE — PROGRESS NOTE ADULT - SUBJECTIVE AND OBJECTIVE BOX
[facility]  INFECTIOUS DISEASES AND INTERNAL MEDICINE at [place]  =======================================================  Basilio Zavala MD  Diplomates American Board of Internal Medicine and Infectious Diseases  =======================================================    KATELYNN AGUILAR [id number]  follow up on lethargy  patient seen and examined in follow up.  Chart and labs reviewed.   Cultures negative  AWAKE BUT LETHARGIC    =======================================================  Allergies:  No Known Allergies    =======================================================  MEDICATIONS  (STANDING):  aspirin  chewable 81 milliGRAM(s) Oral daily  atorvastatin 20 milliGRAM(s) Oral at bedtime  carvedilol 6.25 milliGRAM(s) Oral every 12 hours  dextrose 5%. 1000 milliLiter(s) (50 mL/Hr) IV Continuous <Continuous>  dextrose 50% Injectable 12.5 Gram(s) IV Push once  dextrose 50% Injectable 25 Gram(s) IV Push once  dextrose 50% Injectable 25 Gram(s) IV Push once  finasteride 5 milliGRAM(s) Oral daily  furosemide   Injectable 40 milliGRAM(s) IV Push daily  heparin  Injectable 5000 Unit(s) SubCutaneous every 12 hours  insulin glargine Injectable (LANTUS) 40 Unit(s) SubCutaneous two times a day  insulin lispro (HumaLOG) corrective regimen sliding scale   SubCutaneous Before meals and at bedtime  isosorbide   dinitrate Tablet (ISORDIL) 10 milliGRAM(s) Oral three times a day  levETIRAcetam  Solution 1500 milliGRAM(s) Oral two times a day  pantoprazole  Injectable 40 milliGRAM(s) IV Push two times a day  penicillin   G  potassium  IVPB      penicillin   G  potassium  IVPB 3 Million Unit(s) IV Intermittent every 4 hours  valproate sodium IVPB 500 milliGRAM(s) IV Intermittent every 8 hours       =======================================================     REVIEW OF SYSTEMS:  unable to obtain  =======================================================    Physical Exam:   ICU Vital Signs Last 24 Hrs  T(C): 37.1 (22 Mar 2018 04:01), Max: 37.2 (21 Mar 2018 12:01)  T(F): 98.7 (22 Mar 2018 04:01), Max: 98.9 (21 Mar 2018 12:01)  HR: 96 (22 Mar 2018 11:00) (86 - 97)  BP: 120/75 (22 Mar 2018 11:00) (117/64 - 166/99)  BP(mean): 92 (22 Mar 2018 11:00) (84 - 118)  ABP: --  ABP(mean): --  RR: 21 (22 Mar 2018 11:00) (14 - 32)  SpO2: 98% (22 Mar 2018 11:00) (93% - 100%)        GEN: NAD, LETHARGIC    HEENT: normocephalic and atraumatic. EOMI.    NECK: Supple.   LUNGS: Clear to auscultation.  HEART: Regular rate and rhythm without murmur.  ABDOMEN: Soft, nontender, and nondistended.  Positive bowel sounds.    : + christopher in place  EXTREMITIES: Without any cyanosis, clubbing, rash, lesions or edema.  MSK: no joint swelling  NEUROLOGIC: LETHARGIC  SKIN: No ulceration or induration present.    =======================================================                          13.0   14.3  )-----------( 334      ( 22 Mar 2018 05:39 )             41.0                                                   149<H>  |  101  |  77.0<H>  ----------------------------<  158<H>  5.1   |  38.0<H>  |  2.29<H>    Ca    9.5      22 Mar 2018 05:39  Phos  3.0       Mg     2.6         TPro  7.4  /  Alb  2.5<L>  /  TBili  0.8  /  DBili  x   /  AST  45<H>  /  ALT  19  /  AlkPhos  118            Urinalysis Basic - ( 17 Mar 2018 10:20 )    Color: Yellow / Appearance: Clear / S.010 / pH: x  Gluc: x / Ketone: Negative  / Bili: Negative / Urobili: Negative mg/dL   Blood: x / Protein: 100 mg/dL / Nitrite: Negative   Leuk Esterase: Negative / RBC: 6-10 /HPF / WBC 0-2   Sq Epi: x / Non Sq Epi: Few / Bacteria: Occasional           RECENT CULTURES:  03-10 @ 10:29 .CSF CSF     No growth at 3 days.    No White blood cells  No organisms seen      03-10 @ 10:05 .Blood Blood-Peripheral     No growth at 5 days.       03-10 @ 10:04 .Blood Blood-Peripheral     No growth at 5 days.

## 2018-03-23 DIAGNOSIS — G93.40 ENCEPHALOPATHY, UNSPECIFIED: ICD-10-CM

## 2018-03-23 LAB
ANION GAP SERPL CALC-SCNC: 16 MMOL/L — SIGNIFICANT CHANGE UP (ref 5–17)
BUN SERPL-MCNC: 84 MG/DL — HIGH (ref 8–20)
CALCIUM SERPL-MCNC: 9.6 MG/DL — SIGNIFICANT CHANGE UP (ref 8.6–10.2)
CHLORIDE SERPL-SCNC: 99 MMOL/L — SIGNIFICANT CHANGE UP (ref 98–107)
CO2 SERPL-SCNC: 34 MMOL/L — HIGH (ref 22–29)
CREAT SERPL-MCNC: 2.34 MG/DL — HIGH (ref 0.5–1.3)
GLUCOSE BLDC GLUCOMTR-MCNC: 210 MG/DL — HIGH (ref 70–99)
GLUCOSE BLDC GLUCOMTR-MCNC: 235 MG/DL — HIGH (ref 70–99)
GLUCOSE BLDC GLUCOMTR-MCNC: 241 MG/DL — HIGH (ref 70–99)
GLUCOSE SERPL-MCNC: 255 MG/DL — HIGH (ref 70–115)
HCT VFR BLD CALC: 40.6 % — LOW (ref 42–52)
HGB BLD-MCNC: 12.8 G/DL — LOW (ref 14–18)
MAGNESIUM SERPL-MCNC: 2.6 MG/DL — SIGNIFICANT CHANGE UP (ref 1.6–2.6)
MCHC RBC-ENTMCNC: 29.4 PG — SIGNIFICANT CHANGE UP (ref 27–31)
MCHC RBC-ENTMCNC: 31.5 G/DL — LOW (ref 32–36)
MCV RBC AUTO: 93.3 FL — SIGNIFICANT CHANGE UP (ref 80–94)
PHOSPHATE SERPL-MCNC: 3.2 MG/DL — SIGNIFICANT CHANGE UP (ref 2.4–4.7)
PLATELET # BLD AUTO: 366 K/UL — SIGNIFICANT CHANGE UP (ref 150–400)
POTASSIUM SERPL-MCNC: 4.9 MMOL/L — SIGNIFICANT CHANGE UP (ref 3.5–5.3)
POTASSIUM SERPL-SCNC: 4.9 MMOL/L — SIGNIFICANT CHANGE UP (ref 3.5–5.3)
RBC # BLD: 4.35 M/UL — LOW (ref 4.6–6.2)
RBC # FLD: 15.9 % — HIGH (ref 11–15.6)
SODIUM SERPL-SCNC: 149 MMOL/L — HIGH (ref 135–145)
WBC # BLD: 17.2 K/UL — HIGH (ref 4.8–10.8)
WBC # FLD AUTO: 17.2 K/UL — HIGH (ref 4.8–10.8)

## 2018-03-23 PROCEDURE — 99233 SBSQ HOSP IP/OBS HIGH 50: CPT

## 2018-03-23 PROCEDURE — 99231 SBSQ HOSP IP/OBS SF/LOW 25: CPT

## 2018-03-23 PROCEDURE — 99291 CRITICAL CARE FIRST HOUR: CPT

## 2018-03-23 PROCEDURE — 70450 CT HEAD/BRAIN W/O DYE: CPT | Mod: 26

## 2018-03-23 RX ORDER — SODIUM CHLORIDE 9 MG/ML
1000 INJECTION, SOLUTION INTRAVENOUS
Qty: 0 | Refills: 0 | Status: DISCONTINUED | OUTPATIENT
Start: 2018-03-23 | End: 2018-03-24

## 2018-03-23 RX ORDER — INSULIN LISPRO 100/ML
VIAL (ML) SUBCUTANEOUS EVERY 6 HOURS
Qty: 0 | Refills: 0 | Status: DISCONTINUED | OUTPATIENT
Start: 2018-03-23 | End: 2018-03-24

## 2018-03-23 RX ORDER — VALPROIC ACID (AS SODIUM SALT) 250 MG/5ML
500 SOLUTION, ORAL ORAL EVERY 8 HOURS
Qty: 0 | Refills: 0 | Status: DISCONTINUED | OUTPATIENT
Start: 2018-03-23 | End: 2018-03-24

## 2018-03-23 RX ORDER — GABAPENTIN 400 MG/1
1 CAPSULE ORAL
Qty: 0 | Refills: 0 | COMMUNITY

## 2018-03-23 RX ADMIN — INSULIN GLARGINE 40 UNIT(S): 100 INJECTION, SOLUTION SUBCUTANEOUS at 07:52

## 2018-03-23 RX ADMIN — ISOSORBIDE DINITRATE 10 MILLIGRAM(S): 5 TABLET ORAL at 21:25

## 2018-03-23 RX ADMIN — PENICILLIN G POTASSIUM 100 MILLION UNIT(S): 5000000 POWDER, FOR SOLUTION INTRAMUSCULAR; INTRAPLEURAL; INTRATHECAL; INTRAVENOUS at 02:29

## 2018-03-23 RX ADMIN — Medication 4: at 11:21

## 2018-03-23 RX ADMIN — ISOSORBIDE DINITRATE 10 MILLIGRAM(S): 5 TABLET ORAL at 06:13

## 2018-03-23 RX ADMIN — HEPARIN SODIUM 5000 UNIT(S): 5000 INJECTION INTRAVENOUS; SUBCUTANEOUS at 17:32

## 2018-03-23 RX ADMIN — ISOSORBIDE DINITRATE 10 MILLIGRAM(S): 5 TABLET ORAL at 13:19

## 2018-03-23 RX ADMIN — ATORVASTATIN CALCIUM 20 MILLIGRAM(S): 80 TABLET, FILM COATED ORAL at 21:25

## 2018-03-23 RX ADMIN — FINASTERIDE 5 MILLIGRAM(S): 5 TABLET, FILM COATED ORAL at 11:21

## 2018-03-23 RX ADMIN — PENICILLIN G POTASSIUM 100 MILLION UNIT(S): 5000000 POWDER, FOR SOLUTION INTRAMUSCULAR; INTRAPLEURAL; INTRATHECAL; INTRAVENOUS at 21:25

## 2018-03-23 RX ADMIN — LEVETIRACETAM 1500 MILLIGRAM(S): 250 TABLET, FILM COATED ORAL at 06:13

## 2018-03-23 RX ADMIN — LEVETIRACETAM 1500 MILLIGRAM(S): 250 TABLET, FILM COATED ORAL at 17:33

## 2018-03-23 RX ADMIN — INSULIN GLARGINE 40 UNIT(S): 100 INJECTION, SOLUTION SUBCUTANEOUS at 21:27

## 2018-03-23 RX ADMIN — Medication 81 MILLIGRAM(S): at 11:21

## 2018-03-23 RX ADMIN — PENICILLIN G POTASSIUM 100 MILLION UNIT(S): 5000000 POWDER, FOR SOLUTION INTRAMUSCULAR; INTRAPLEURAL; INTRATHECAL; INTRAVENOUS at 17:40

## 2018-03-23 RX ADMIN — CARVEDILOL PHOSPHATE 6.25 MILLIGRAM(S): 80 CAPSULE, EXTENDED RELEASE ORAL at 06:13

## 2018-03-23 RX ADMIN — PENICILLIN G POTASSIUM 100 MILLION UNIT(S): 5000000 POWDER, FOR SOLUTION INTRAMUSCULAR; INTRAPLEURAL; INTRATHECAL; INTRAVENOUS at 13:18

## 2018-03-23 RX ADMIN — CARVEDILOL PHOSPHATE 6.25 MILLIGRAM(S): 80 CAPSULE, EXTENDED RELEASE ORAL at 17:33

## 2018-03-23 RX ADMIN — Medication 4: at 17:33

## 2018-03-23 RX ADMIN — Medication 40 MILLIGRAM(S): at 06:13

## 2018-03-23 RX ADMIN — Medication 27.5 MILLIGRAM(S): at 06:14

## 2018-03-23 RX ADMIN — HEPARIN SODIUM 5000 UNIT(S): 5000 INJECTION INTRAVENOUS; SUBCUTANEOUS at 06:13

## 2018-03-23 RX ADMIN — PENICILLIN G POTASSIUM 100 MILLION UNIT(S): 5000000 POWDER, FOR SOLUTION INTRAMUSCULAR; INTRAPLEURAL; INTRATHECAL; INTRAVENOUS at 09:01

## 2018-03-23 RX ADMIN — PENICILLIN G POTASSIUM 100 MILLION UNIT(S): 5000000 POWDER, FOR SOLUTION INTRAMUSCULAR; INTRAPLEURAL; INTRATHECAL; INTRAVENOUS at 06:14

## 2018-03-23 RX ADMIN — Medication 500 MILLIGRAM(S): at 13:18

## 2018-03-23 RX ADMIN — Medication 40 MILLIGRAM(S): at 17:33

## 2018-03-23 RX ADMIN — Medication 500 MILLIGRAM(S): at 21:25

## 2018-03-23 RX ADMIN — Medication 4: at 06:29

## 2018-03-23 NOTE — PROGRESS NOTE ADULT - PROBLEM SELECTOR PLAN 2
- requires frequent suctioning  -again spoke to wife about potential need and risk for intubation due to difficulty clearing his airway.

## 2018-03-23 NOTE — CHART NOTE - NSCHARTNOTEFT_GEN_A_CORE
Source: Patient [ ]  Family [ ]   other [X ] EMR and RN    Current Diet: NPO with enteral feeds.    Patient reports [ ] nausea  [ ] vomiting [ ] diarrhea [ ] constipation  [ ]chewing problems [ ] swallowing issues  [ ] other:    Enteral /Parenteral Nutrition: Glucerna 1.5cal at 60ml/hr - currently on hold for testing.    Current Weight: (3/23) 123.3kg, (3/18) 125.3kg (3/9) 122.4kg    Pertinent Medications: MEDICATIONS  (STANDING):  aspirin  chewable 81 milliGRAM(s) Oral daily  atorvastatin 20 milliGRAM(s) Oral at bedtime  carvedilol 6.25 milliGRAM(s) Oral every 12 hours  dextrose 5%. 1000 milliLiter(s) (50 mL/Hr) IV Continuous <Continuous>  dextrose 50% Injectable 12.5 Gram(s) IV Push once  dextrose 50% Injectable 25 Gram(s) IV Push once  dextrose 50% Injectable 25 Gram(s) IV Push once  finasteride 5 milliGRAM(s) Oral daily  furosemide    Tablet 40 milliGRAM(s) Oral two times a day  heparin  Injectable 5000 Unit(s) SubCutaneous every 12 hours  insulin glargine Injectable (LANTUS) 40 Unit(s) SubCutaneous two times a day  insulin lispro (HumaLOG) corrective regimen sliding scale   SubCutaneous Before meals and at bedtime  isosorbide   dinitrate Tablet (ISORDIL) 10 milliGRAM(s) Oral three times a day  levETIRAcetam  Solution 1500 milliGRAM(s) Oral two times a day  penicillin   G  potassium  IVPB      penicillin   G  potassium  IVPB 3 Million Unit(s) IV Intermittent every 4 hours  valproic  acid Syrup 500 milliGRAM(s) Oral every 8 hours    MEDICATIONS  (PRN):  ALBUTerol    90 MICROgram(s) HFA Inhaler 2 Puff(s) Inhalation every 6 hours PRN Shortness of Breath  dextrose Gel 1 Dose(s) Oral once PRN Blood Glucose LESS THAN 70 milliGRAM(s)/deciLiter  glucagon  Injectable 1 milliGRAM(s) IntraMuscular once PRN Glucose <70 milliGRAM(s)/deciLiter    Pertinent Labs: CBC Full  -  ( 23 Mar 2018 05:44 )  WBC Count : 17.2 K/uL  Hemoglobin : 12.8 g/dL  Hematocrit : 40.6 %  Na 149, BUN 84, Cr 2.34, Glucose 255    Skin: No breakdown noted.    Nutrition focused physical exam NOT conducted - found signs of malnutrition [ ]absent [ ]present  Pt leaving unit for test at this time.  Subcutaneous fat loss: [ ] Orbital fat pads region, [ ]Buccal fat region, [ ]Triceps region,  [ ]Ribs region    Muscle wasting: [ ]Temples region, [ ]Clavicle region, [ ]Shoulder region, [ ]Scapula region, [ ]Interosseous region,  [ ]thigh region, [ ]Calf region    Estimated Needs:   [X ] no change since previous assessment  [ ] recalculated:     Current Nutrition Diagnosis: Pt at high nutritional risk secondary to difficulty chewing/swallowing related to encepholapathy as evidenced by pt with increased lethargy, unable to tolerate po diet and is currently receiving enteral feeds via NGT. Pt has been tolerating Glucerna 1.5cal at 60ml/hr well per RN and is meeting estimated nutritional needs. TF currently on hold for testing.    Recommendations: Continue Glucerna 1.5cal at 60ml/hr x 20 hours will provide 1800kcal and 99 grams protein daily.    Monitoring and Evaluation:   [ ] PO intake [X ] Tolerance to enteral feeds [X] Weights  [X] Follow up per protocol [X] Labs: renal labs and glucose

## 2018-03-23 NOTE — PROGRESS NOTE ADULT - PROBLEM SELECTOR PROBLEM 6
CKD (chronic kidney disease)
CKD (chronic kidney disease)
Hypoxia
CKD (chronic kidney disease)
CKD (chronic kidney disease)
Hypoxia
CKD (chronic kidney disease)
Hypoxia

## 2018-03-23 NOTE — PROGRESS NOTE ADULT - PROBLEM SELECTOR PLAN 1
Toxic metabolic encephalopathy and now with stroke, PLEDS on EEG  On PCN for neurosyphilis as possible etiology, awaiting repeat RPR  HOB > 30 deg for aspiration precautions

## 2018-03-23 NOTE — PROGRESS NOTE ADULT - SUBJECTIVE AND OBJECTIVE BOX
[facility]                                        Neurology at [place]                                 Marco Cox, & Austyn                                  [address]                                        [place], [place], [address]                                             [phone]        Remains in MICU   on nasal O2.     Vital Signs Last 24 Hrs  T(F): 97.7 (23 Mar 2018 08:00), Max: 100.3 (22 Mar 2018 19:00)  HR: 96 (23 Mar 2018 11:00) (87 - 112)  BP: 132/63 (23 Mar 2018 11:00) (126/63 - 154/112)  BP(mean): 90 (23 Mar 2018 11:00) (86 - 122)  RR: 16 (23 Mar 2018 11:00) (13 - 38)  SpO2: 100% (23 Mar 2018 11:00) (92% - 100%)    Slight eye opening to stimuli.  Not responding to voice.   Not following instructions.   Pupils react.  Mild right facial.   Moving left purposefully to stimuli.   No movement on right.

## 2018-03-23 NOTE — PROGRESS NOTE ADULT - ATTENDING COMMENTS
Thank you for the opportunity to assist with the care of this patient.   [facility] Consult Service [phone].

## 2018-03-23 NOTE — PROGRESS NOTE ADULT - SUBJECTIVE AND OBJECTIVE BOX
OVERNIGHT EVENTS:    BRIEF HOSPITAL COURSE:    Present Symptoms:     Dyspnea: 0 1 2 3   Nausea/Vomiting: Yes No  Anxiety:  Yes No  Depression: Yes No  Fatigue: Yes No  Loss of appetite: Yes No    Pain:             Character-            Duration-            Effect-            Factors-            Frequency-            Location-            Severity-    Review of Systems: Reviewed                     Negative:                     Positive:  Unable to obtain due to poor mentation   All others negative    MEDICATIONS  (STANDING):  aspirin  chewable 81 milliGRAM(s) Oral daily  atorvastatin 20 milliGRAM(s) Oral at bedtime  carvedilol 6.25 milliGRAM(s) Oral every 12 hours  dextrose 5%. 1000 milliLiter(s) (50 mL/Hr) IV Continuous <Continuous>  dextrose 50% Injectable 12.5 Gram(s) IV Push once  dextrose 50% Injectable 25 Gram(s) IV Push once  dextrose 50% Injectable 25 Gram(s) IV Push once  finasteride 5 milliGRAM(s) Oral daily  furosemide    Tablet 40 milliGRAM(s) Oral two times a day  heparin  Injectable 5000 Unit(s) SubCutaneous every 12 hours  insulin glargine Injectable (LANTUS) 40 Unit(s) SubCutaneous two times a day  insulin lispro (HumaLOG) corrective regimen sliding scale   SubCutaneous Before meals and at bedtime  isosorbide   dinitrate Tablet (ISORDIL) 10 milliGRAM(s) Oral three times a day  levETIRAcetam  Solution 1500 milliGRAM(s) Oral two times a day  penicillin   G  potassium  IVPB      penicillin   G  potassium  IVPB 3 Million Unit(s) IV Intermittent every 4 hours  valproic  acid Syrup 500 milliGRAM(s) Oral every 8 hours    MEDICATIONS  (PRN):  ALBUTerol    90 MICROgram(s) HFA Inhaler 2 Puff(s) Inhalation every 6 hours PRN Shortness of Breath  dextrose Gel 1 Dose(s) Oral once PRN Blood Glucose LESS THAN 70 milliGRAM(s)/deciLiter  glucagon  Injectable 1 milliGRAM(s) IntraMuscular once PRN Glucose <70 milliGRAM(s)/deciLiter      PHYSICAL EXAM:    Vital Signs Last 24 Hrs  T(C): 36.9 (23 Mar 2018 15:44), Max: 37.9 (22 Mar 2018 19:00)  T(F): 98.4 (23 Mar 2018 15:44), Max: 100.3 (22 Mar 2018 19:00)  HR: 99 (23 Mar 2018 15:00) (87 - 112)  BP: 134/78 (23 Mar 2018 15:00) (126/63 - 159/67)  BP(mean): 83 (23 Mar 2018 15:00) (83 - 116)  RR: 21 (23 Mar 2018 15:00) (13 - 38)  SpO2: 95% (23 Mar 2018 15:00) (91% - 100%)    General: alert  oriented x ____ lethargic agitated                  cachexia  nonverbal  coma    Karnofsky:  %    HEENT: normal  dry mouth  ET tube/trach    Lungs: comfortable tachypnea/labored breathing  excessive secretions    CV: normal  tachycardia    GI: normal  distended  tender  no BS               PEG/NG/OG tube  constipation  last BM:     : normal  incontinent  oliguria/anuria  christopher    MSK: normal  weakness  edema             ambulatory  bedbound/wheelchair bound    Skin: normal  pressure ulcers- Stage_____  no rash    LABS:                          12.8   17.2  )-----------( 366      ( 23 Mar 2018 05:44 )             40.6     03-23    149<H>  |  99  |  84.0<H>  ----------------------------<  255<H>  4.9   |  34.0<H>  |  2.34<H>    Ca    9.6      23 Mar 2018 05:44  Phos  3.2     03-23  Mg     2.6     03-23    TPro  7.4  /  Alb  2.5<L>  /  TBili  0.8  /  DBili  x   /  AST  45<H>  /  ALT  19  /  AlkPhos  118  03-22        I&O's Summary    22 Mar 2018 07:01  -  23 Mar 2018 07:00  --------------------------------------------------------  IN: 3105 mL / OUT: 0 mL / NET: 3105 mL    23 Mar 2018 07:01  -  23 Mar 2018 16:48  --------------------------------------------------------  IN: 985 mL / OUT: 0 mL / NET: 985 mL        RADIOLOGY & ADDITIONAL STUDIES:    ADVANCE DIRECTIVES:   DNR YES NO  Completed on:                     DAVIE  YES NO   Completed on:  Living Will  YES NO   Completed on: OVERNIGHT EVENTS: 73M with acute L PCA infarct, persistent encephalopathy, concerns for encephalitis, PLEDs, copious secretions concerns for aspiration, dysphagia with NG tube, repeat head CT today showed stable subacute left PCA territory infarcts, and stable posterior left internal capsule limb infarct.     Present Symptoms:     Dyspnea: 1- 2  Nausea/Vomiting: No  Anxiety:  unable   Depression: unable   Fatigue: Yes   Loss of appetite: unable     Pain: none             Character-            Duration-            Effect-            Factors-            Frequency-            Location-            Severity-    Review of Systems: Reviewed                   Unable to obtain due to poor mentation     MEDICATIONS  (STANDING):  aspirin  chewable 81 milliGRAM(s) Oral daily  atorvastatin 20 milliGRAM(s) Oral at bedtime  carvedilol 6.25 milliGRAM(s) Oral every 12 hours  dextrose 5%. 1000 milliLiter(s) (50 mL/Hr) IV Continuous <Continuous>  dextrose 50% Injectable 12.5 Gram(s) IV Push once  dextrose 50% Injectable 25 Gram(s) IV Push once  dextrose 50% Injectable 25 Gram(s) IV Push once  finasteride 5 milliGRAM(s) Oral daily  furosemide    Tablet 40 milliGRAM(s) Oral two times a day  heparin  Injectable 5000 Unit(s) SubCutaneous every 12 hours  insulin glargine Injectable (LANTUS) 40 Unit(s) SubCutaneous two times a day  insulin lispro (HumaLOG) corrective regimen sliding scale   SubCutaneous Before meals and at bedtime  isosorbide   dinitrate Tablet (ISORDIL) 10 milliGRAM(s) Oral three times a day  levETIRAcetam  Solution 1500 milliGRAM(s) Oral two times a day  penicillin   G  potassium  IVPB      penicillin   G  potassium  IVPB 3 Million Unit(s) IV Intermittent every 4 hours  valproic  acid Syrup 500 milliGRAM(s) Oral every 8 hours    MEDICATIONS  (PRN):  ALBUTerol    90 MICROgram(s) HFA Inhaler 2 Puff(s) Inhalation every 6 hours PRN Shortness of Breath  dextrose Gel 1 Dose(s) Oral once PRN Blood Glucose LESS THAN 70 milliGRAM(s)/deciLiter  glucagon  Injectable 1 milliGRAM(s) IntraMuscular once PRN Glucose <70 milliGRAM(s)/deciLiter    PHYSICAL EXAM:    Vital Signs Last 24 Hrs  T(C): 36.9 (23 Mar 2018 15:44), Max: 37.9 (22 Mar 2018 19:00)  T(F): 98.4 (23 Mar 2018 15:44), Max: 100.3 (22 Mar 2018 19:00)  HR: 99 (23 Mar 2018 15:00) (87 - 112)  BP: 134/78 (23 Mar 2018 15:00) (126/63 - 159/67)  BP(mean): 83 (23 Mar 2018 15:00) (83 - 116)  RR: 21 (23 Mar 2018 15:00) (13 - 38)  SpO2: 95% (23 Mar 2018 15:00) (91% - 100%)    General: lethargic, unresponsive, does not follow commands, some spontaneous movements of left UE     Karnofsky:  30 %    HEENT: normal      Lungs: tachypnea/labored breathing  excessive secretions    CV: normal      GI: NG tube     : normal     MSK: right hemiparesis     Skin: no rash    LABS:                     12.8   17.2  )-----------( 366      ( 23 Mar 2018 05:44 )             40.6     03-23    149<H>  |  99  |  84.0<H>  ----------------------------<  255<H>  4.9   |  34.0<H>  |  2.34<H>    Ca    9.6      23 Mar 2018 05:44  Phos  3.2     03-23  Mg     2.6     03-23    TPro  7.4  /  Alb  2.5<L>  /  TBili  0.8  /  DBili  x   /  AST  45<H>  /  ALT  19  /  AlkPhos  118  03-22    I&O's Summary    22 Mar 2018 07:01  -  23 Mar 2018 07:00  --------------------------------------------------------  IN: 3105 mL / OUT: 0 mL / NET: 3105 mL    23 Mar 2018 07:01  -  23 Mar 2018 16:48  --------------------------------------------------------  IN: 985 mL / OUT: 0 mL / NET: 985 mL    RADIOLOGY & ADDITIONAL STUDIES:    < from: CT Head No Cont (03.23.18 @ 14:56) >  Stable subacute left PCA territory infarcts. Stable posterior left internal capsule limb infarct. Mild chronic microvascular changes without evidence of new transcortical infarction or hemorrhage.     ADVANCE DIRECTIVES: Full Code

## 2018-03-23 NOTE — PROGRESS NOTE ADULT - PROBLEM SELECTOR PLAN 5
-Dr. Hair and I briefly met with wife and discussed the potential concerns and risks for aspiration and intubation, and likelihood that he may need a trach and a feeding tube. She wants us to come back when her son comes in.

## 2018-03-24 VITALS — RESPIRATION RATE: 9 BRPM | OXYGEN SATURATION: 65 %

## 2018-03-24 DIAGNOSIS — J69.0 PNEUMONITIS DUE TO INHALATION OF FOOD AND VOMIT: ICD-10-CM

## 2018-03-24 LAB
ANION GAP SERPL CALC-SCNC: 12 MMOL/L — SIGNIFICANT CHANGE UP (ref 5–17)
ANION GAP SERPL CALC-SCNC: 18 MMOL/L — HIGH (ref 5–17)
APTT BLD: 24.6 SEC — LOW (ref 27.5–37.4)
BUN SERPL-MCNC: 100 MG/DL — HIGH (ref 8–20)
BUN SERPL-MCNC: 99 MG/DL — HIGH (ref 8–20)
CALCIUM SERPL-MCNC: 9.2 MG/DL — SIGNIFICANT CHANGE UP (ref 8.6–10.2)
CALCIUM SERPL-MCNC: 9.2 MG/DL — SIGNIFICANT CHANGE UP (ref 8.6–10.2)
CHLORIDE SERPL-SCNC: 97 MMOL/L — LOW (ref 98–107)
CHLORIDE SERPL-SCNC: 97 MMOL/L — LOW (ref 98–107)
CO2 SERPL-SCNC: 31 MMOL/L — HIGH (ref 22–29)
CO2 SERPL-SCNC: 33 MMOL/L — HIGH (ref 22–29)
CREAT SERPL-MCNC: 2.69 MG/DL — HIGH (ref 0.5–1.3)
CREAT SERPL-MCNC: 3.18 MG/DL — HIGH (ref 0.5–1.3)
GLUCOSE BLDC GLUCOMTR-MCNC: 199 MG/DL — HIGH (ref 70–99)
GLUCOSE BLDC GLUCOMTR-MCNC: 204 MG/DL — HIGH (ref 70–99)
GLUCOSE BLDC GLUCOMTR-MCNC: 207 MG/DL — HIGH (ref 70–99)
GLUCOSE SERPL-MCNC: 228 MG/DL — HIGH (ref 70–115)
GLUCOSE SERPL-MCNC: 230 MG/DL — HIGH (ref 70–115)
HCT VFR BLD CALC: 33.6 % — LOW (ref 42–52)
HCT VFR BLD CALC: 35.4 % — LOW (ref 42–52)
HGB BLD-MCNC: 10.3 G/DL — LOW (ref 14–18)
HGB BLD-MCNC: 11 G/DL — LOW (ref 14–18)
INR BLD: 1.33 RATIO — HIGH (ref 0.88–1.16)
MAGNESIUM SERPL-MCNC: 2.7 MG/DL — HIGH (ref 1.6–2.6)
MAGNESIUM SERPL-MCNC: 2.9 MG/DL — HIGH (ref 1.6–2.6)
MCHC RBC-ENTMCNC: 28.9 PG — SIGNIFICANT CHANGE UP (ref 27–31)
MCHC RBC-ENTMCNC: 29.3 PG — SIGNIFICANT CHANGE UP (ref 27–31)
MCHC RBC-ENTMCNC: 30.7 G/DL — LOW (ref 32–36)
MCHC RBC-ENTMCNC: 31.1 G/DL — LOW (ref 32–36)
MCV RBC AUTO: 92.9 FL — SIGNIFICANT CHANGE UP (ref 80–94)
MCV RBC AUTO: 95.7 FL — HIGH (ref 80–94)
PHOSPHATE SERPL-MCNC: 3.7 MG/DL — SIGNIFICANT CHANGE UP (ref 2.4–4.7)
PHOSPHATE SERPL-MCNC: 5.3 MG/DL — HIGH (ref 2.4–4.7)
PLATELET # BLD AUTO: 391 K/UL — SIGNIFICANT CHANGE UP (ref 150–400)
PLATELET # BLD AUTO: 417 K/UL — HIGH (ref 150–400)
POTASSIUM SERPL-MCNC: 5.2 MMOL/L — SIGNIFICANT CHANGE UP (ref 3.5–5.3)
POTASSIUM SERPL-MCNC: 5.5 MMOL/L — HIGH (ref 3.5–5.3)
POTASSIUM SERPL-SCNC: 5.2 MMOL/L — SIGNIFICANT CHANGE UP (ref 3.5–5.3)
POTASSIUM SERPL-SCNC: 5.5 MMOL/L — HIGH (ref 3.5–5.3)
PROTHROM AB SERPL-ACNC: 14.7 SEC — HIGH (ref 9.8–12.7)
RBC # BLD: 3.51 M/UL — LOW (ref 4.6–6.2)
RBC # BLD: 3.81 M/UL — LOW (ref 4.6–6.2)
RBC # FLD: 16.4 % — HIGH (ref 11–15.6)
RBC # FLD: 16.5 % — HIGH (ref 11–15.6)
SODIUM SERPL-SCNC: 142 MMOL/L — SIGNIFICANT CHANGE UP (ref 135–145)
SODIUM SERPL-SCNC: 146 MMOL/L — HIGH (ref 135–145)
TSH SERPL-MCNC: 2.91 UIU/ML — SIGNIFICANT CHANGE UP (ref 0.27–4.2)
WBC # BLD: 19.5 K/UL — HIGH (ref 4.8–10.8)
WBC # BLD: 25.1 K/UL — HIGH (ref 4.8–10.8)
WBC # FLD AUTO: 19.5 K/UL — HIGH (ref 4.8–10.8)
WBC # FLD AUTO: 25.1 K/UL — HIGH (ref 4.8–10.8)

## 2018-03-24 PROCEDURE — 71045 X-RAY EXAM CHEST 1 VIEW: CPT

## 2018-03-24 PROCEDURE — 95819 EEG AWAKE AND ASLEEP: CPT

## 2018-03-24 PROCEDURE — 31720 CLEARANCE OF AIRWAYS: CPT

## 2018-03-24 PROCEDURE — 85730 THROMBOPLASTIN TIME PARTIAL: CPT

## 2018-03-24 PROCEDURE — 85610 PROTHROMBIN TIME: CPT

## 2018-03-24 PROCEDURE — 80053 COMPREHEN METABOLIC PANEL: CPT

## 2018-03-24 PROCEDURE — 82330 ASSAY OF CALCIUM: CPT

## 2018-03-24 PROCEDURE — 94002 VENT MGMT INPAT INIT DAY: CPT

## 2018-03-24 PROCEDURE — 93306 TTE W/DOPPLER COMPLETE: CPT

## 2018-03-24 PROCEDURE — 86593 SYPHILIS TEST NON-TREP QUANT: CPT

## 2018-03-24 PROCEDURE — 87070 CULTURE OTHR SPECIMN AEROBIC: CPT

## 2018-03-24 PROCEDURE — 87799 DETECT AGENT NOS DNA QUANT: CPT

## 2018-03-24 PROCEDURE — 82962 GLUCOSE BLOOD TEST: CPT

## 2018-03-24 PROCEDURE — 97110 THERAPEUTIC EXERCISES: CPT

## 2018-03-24 PROCEDURE — 87529 HSV DNA AMP PROBE: CPT

## 2018-03-24 PROCEDURE — 97167 OT EVAL HIGH COMPLEX 60 MIN: CPT

## 2018-03-24 PROCEDURE — 83036 HEMOGLOBIN GLYCOSYLATED A1C: CPT

## 2018-03-24 PROCEDURE — 97112 NEUROMUSCULAR REEDUCATION: CPT

## 2018-03-24 PROCEDURE — 86780 TREPONEMA PALLIDUM: CPT

## 2018-03-24 PROCEDURE — 94770: CPT

## 2018-03-24 PROCEDURE — 36600 WITHDRAWAL OF ARTERIAL BLOOD: CPT

## 2018-03-24 PROCEDURE — 87040 BLOOD CULTURE FOR BACTERIA: CPT

## 2018-03-24 PROCEDURE — 92610 EVALUATE SWALLOWING FUNCTION: CPT

## 2018-03-24 PROCEDURE — 80048 BASIC METABOLIC PNL TOTAL CA: CPT

## 2018-03-24 PROCEDURE — 93005 ELECTROCARDIOGRAM TRACING: CPT

## 2018-03-24 PROCEDURE — 99285 EMERGENCY DEPT VISIT HI MDM: CPT

## 2018-03-24 PROCEDURE — 85027 COMPLETE CBC AUTOMATED: CPT

## 2018-03-24 PROCEDURE — 82550 ASSAY OF CK (CPK): CPT

## 2018-03-24 PROCEDURE — 97530 THERAPEUTIC ACTIVITIES: CPT

## 2018-03-24 PROCEDURE — 82803 BLOOD GASES ANY COMBINATION: CPT

## 2018-03-24 PROCEDURE — 82945 GLUCOSE OTHER FLUID: CPT

## 2018-03-24 PROCEDURE — 95951: CPT

## 2018-03-24 PROCEDURE — 70450 CT HEAD/BRAIN W/O DYE: CPT

## 2018-03-24 PROCEDURE — 92526 ORAL FUNCTION THERAPY: CPT

## 2018-03-24 PROCEDURE — 82140 ASSAY OF AMMONIA: CPT

## 2018-03-24 PROCEDURE — 70496 CT ANGIOGRAPHY HEAD: CPT

## 2018-03-24 PROCEDURE — 89051 BODY FLUID CELL COUNT: CPT

## 2018-03-24 PROCEDURE — 97163 PT EVAL HIGH COMPLEX 45 MIN: CPT

## 2018-03-24 PROCEDURE — 84157 ASSAY OF PROTEIN OTHER: CPT

## 2018-03-24 PROCEDURE — 84484 ASSAY OF TROPONIN QUANT: CPT

## 2018-03-24 PROCEDURE — 84295 ASSAY OF SERUM SODIUM: CPT

## 2018-03-24 PROCEDURE — 84443 ASSAY THYROID STIM HORMONE: CPT

## 2018-03-24 PROCEDURE — 87205 SMEAR GRAM STAIN: CPT

## 2018-03-24 PROCEDURE — 74018 RADEX ABDOMEN 1 VIEW: CPT

## 2018-03-24 PROCEDURE — 80164 ASSAY DIPROPYLACETIC ACD TOT: CPT

## 2018-03-24 PROCEDURE — 83605 ASSAY OF LACTIC ACID: CPT

## 2018-03-24 PROCEDURE — 82947 ASSAY GLUCOSE BLOOD QUANT: CPT

## 2018-03-24 PROCEDURE — 85014 HEMATOCRIT: CPT

## 2018-03-24 PROCEDURE — 82435 ASSAY OF BLOOD CHLORIDE: CPT

## 2018-03-24 PROCEDURE — 84132 ASSAY OF SERUM POTASSIUM: CPT

## 2018-03-24 PROCEDURE — 94003 VENT MGMT INPAT SUBQ DAY: CPT

## 2018-03-24 PROCEDURE — 86592 SYPHILIS TEST NON-TREP QUAL: CPT

## 2018-03-24 PROCEDURE — 82553 CREATINE MB FRACTION: CPT

## 2018-03-24 PROCEDURE — 80307 DRUG TEST PRSMV CHEM ANLYZR: CPT

## 2018-03-24 PROCEDURE — 36415 COLL VENOUS BLD VENIPUNCTURE: CPT

## 2018-03-24 PROCEDURE — 99291 CRITICAL CARE FIRST HOUR: CPT

## 2018-03-24 PROCEDURE — 83735 ASSAY OF MAGNESIUM: CPT

## 2018-03-24 PROCEDURE — 81001 URINALYSIS AUTO W/SCOPE: CPT

## 2018-03-24 PROCEDURE — 70498 CT ANGIOGRAPHY NECK: CPT

## 2018-03-24 PROCEDURE — 84100 ASSAY OF PHOSPHORUS: CPT

## 2018-03-24 PROCEDURE — 87483 CNS DNA AMP PROBE TYPE 12-25: CPT

## 2018-03-24 RX ADMIN — Medication 2: at 06:07

## 2018-03-24 RX ADMIN — Medication 40 MILLIGRAM(S): at 06:04

## 2018-03-24 RX ADMIN — Medication 500 MILLIGRAM(S): at 06:07

## 2018-03-24 RX ADMIN — ISOSORBIDE DINITRATE 10 MILLIGRAM(S): 5 TABLET ORAL at 06:05

## 2018-03-24 RX ADMIN — PENICILLIN G POTASSIUM 100 MILLION UNIT(S): 5000000 POWDER, FOR SOLUTION INTRAMUSCULAR; INTRAPLEURAL; INTRATHECAL; INTRAVENOUS at 01:27

## 2018-03-24 RX ADMIN — CARVEDILOL PHOSPHATE 6.25 MILLIGRAM(S): 80 CAPSULE, EXTENDED RELEASE ORAL at 06:04

## 2018-03-24 RX ADMIN — HEPARIN SODIUM 5000 UNIT(S): 5000 INJECTION INTRAVENOUS; SUBCUTANEOUS at 06:05

## 2018-03-24 RX ADMIN — Medication 2: at 00:32

## 2018-03-24 RX ADMIN — PENICILLIN G POTASSIUM 100 MILLION UNIT(S): 5000000 POWDER, FOR SOLUTION INTRAMUSCULAR; INTRAPLEURAL; INTRATHECAL; INTRAVENOUS at 06:04

## 2018-03-24 RX ADMIN — LEVETIRACETAM 1500 MILLIGRAM(S): 250 TABLET, FILM COATED ORAL at 06:08

## 2018-03-24 RX ADMIN — INSULIN GLARGINE 40 UNIT(S): 100 INJECTION, SOLUTION SUBCUTANEOUS at 08:01

## 2018-03-24 NOTE — DISCHARGE NOTE FOR THE EXPIRED PATIENT - HOSPITAL COURSE
Pt in respiratory arrest, ROSC Achieved twice but ultimately pronounced 72 y/o M with a h/o DM, CHF, HTN, CKD, shingles over the past 3 months, who presented BIBA after being found unresponsive by wife at home. Upon arrival to ED, /110, patient was unresponsive to verbal stimuli, and was intubated by Anesthesia for concerns of inability to protect airway and hypoxia. Patient was noted to have L cheek focal twitching, withdrew to noxious stimuli x 4 extremities, however had a deficit on LUE / LLE. Initial CT head negative for acute hemorrhage or infarct, no visible MCA sign. Deemed not a candidate for tPA. Later developed tonic-clonic seizure activity. EEG performed without seizure activity noted. 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 Possible neurosyphilis, testing thus far equivocal. Extubated on 3/11.  Hospital course complicated by L PCA and internal capsule infarct. about 0900; he was unresponsive but when agitated was moving his LUE and LLE.  Upon further stimulation, nursing noted some potential seizure like activity, evaluation (around 1030) appeared to be purposeful LUE movement associated with severe tremor.  CODE BLUE was called; CPR and ACLS was started immediately; patient was intubated, and ROSC was obtained.  See cardiac arrest sheet for more information. he is arresting a fourth time.  He was made comfortable and informed the wife of his passing. Pt in respiratory arrest, ROSC Achieved twice but ultimately pronounced

## 2018-03-24 NOTE — PROGRESS NOTE ADULT - PROBLEM SELECTOR PROBLEM 1
Encephalopathy
Cerebrovascular accident (CVA), unspecified mechanism
Encephalopathy
Encephalopathy acute
R/O Viral encephalitis
Seizure
Acute respiratory failure with hypoxia
R/O Viral encephalitis
Seizure
R/O Viral encephalitis
Encephalopathy
Cerebrovascular accident (CVA), unspecified mechanism
Seizure
R/O Viral encephalitis

## 2018-03-24 NOTE — PROGRESS NOTE ADULT - PROBLEM SELECTOR PLAN 4
insulin drip
Unclear etiology. R/o viral encephalitis. Continue Keppra. Seizure precautions.
controlled with lantus and SSI  one episode of hypoglycemia prior to initiation of tube feeds   [id number]
No seizures on 24h EEG will continue EEG for another day  Continue Keppra and Johnnie  Seizure precautions
-right hemiparesis related to acute stroke  -assist with all ADLs
No seizures on 24h EEG off at this point  Continue Keppra and Johnnie  Seizure precautions
No seizures on 24h EEG will continue EEG for another day  Continue Keppra and Johnnie  Seizure precautions
No seizures on 24h EEG will continue EEG for another day  Continue Keppra and Johnnie  Seizure precautions
No seizures on 24h EEG, ctm  Continue Keppra and Depacon  Seizure precautions
Related to continual aspiration in the setting of encephalopathy, dysphagia  Continue high flow nasal cannula, titrate to FiO2 > 90%  Continue ICU level of care in the event patient requires urgent intubation for decompensated respiratory status
Unclear etiology. R/o viral encephalitis. Continue Keppra. Will use IV ativan to suppress further seizure activity. Seizure precautions.
as above  Unclear etiology  Increased Keppra, added Depacon  24h EEG- try to suppress PLEDs   Seizure precautions.  neuro following- d/w Dr. Lara
as above  Unclear etiology  Increased Keppra, added Depacon- f/u Depacon level tomorrow AM  24h EEG- try to suppress PLEDs - which appear to be less frequent on tracing.  neuro following- d/w Dr. Lara
lantus and q 4 hr SSI
Initial CSF studies unremarkable. F/u VZV CSF PCR in setting of ongoing shingles. Antibiotic therapy has been d/c'd, however, acyclovir continues. HSV CSF PCR neg.
R/o viral encephalitis. Continue Keppra. Will use IV ativan to suppress further seizure activity. Seizure precautions.
insulin drip
Unclear etiology. R/o viral encephalitis. Continue Keppra. Seizure precautions.
No seizures on 24h EEG off at this point  Continue Keppra and Johnnie  Seizure precautions
-post tx for potential VZV. on treatment for possible neurosyphilis with PEN G.   -being treated for acute stroke with medical therapies.

## 2018-03-24 NOTE — PROGRESS NOTE ADULT - PROBLEM SELECTOR PROBLEM 2
Encephalopathy
Acute respiratory failure with hypoxia
R/O Viral encephalitis
Stage 3 chronic kidney disease
Abnormal sputum
Encephalopathy
Encephalopathy acute
R/O Neurosyphilis
R/O Viral encephalitis
Stage 3 chronic kidney disease
Encephalopathy
Encephalopathy acute
Seizure
Encephalopathy
R/O Viral encephalitis
Abnormal sputum
Encephalopathy acute

## 2018-03-24 NOTE — PROCEDURE NOTE - NSPROCDETAILS_GEN_ALL_CORE
patient pre-oxygenated, tube inserted, placement confirmed/difficult/crash intubation/connected to ventilator
location identified, draped/prepped, sterile technique used, needle inserted/introduced

## 2018-03-24 NOTE — CHART NOTE - NSCHARTNOTEFT_GEN_A_CORE
I saw this patient earlier, about 0900; he was unresponsive but when agitated was moving his LUE and LLE.  Upon further stimulation, nursing noted some potential seizure like activity, but in my evaluation (around 1030) appeared to be purposeful LUE movement associated with severe tremor -- was going to watch.    YANDY MARCOS was called; CPR and ACLS was started immediately; patient was intubated, and ROSC was obtained.  See cardiac arrest sheet for more information.    Second arrest took place -- again ACLS was performed.  I placed an emergent CVC in the R femoral vein without sterile technique.  ROSC was obtained.    Third arrest unfortunately took place, with proper ACLS technique.  ROSC again obtained.    I called the wife (the wife was called by Roseanna IRWIN in the first arrest and stated she was en route).  She told me she was still waiting for her son to pick her up.  I informed her of the subsequent arrests and informed her that it was unlikely he would survive to her arrival and we will only do what makes sense with the hope that he survives until her arrival, but no more CPR.  She understood.    While writing this note, he is arresting a fourth time.  We will make him comfortable and inform her of his passing.    Critical care time: 70 minutes.

## 2018-03-24 NOTE — PROGRESS NOTE ADULT - PROBLEM SELECTOR PLAN 1
Remains persistently encephalopathic now in the setting of new CVA  Appears to be aspirating on clinical exam, raised HOB > 45 degrees  Continue ICU level of care as patient continues to aspirate, is unable to protect airway due to encephalopathy, and is high risk for acute respiratory decompensation necessitating intubation  Continue ongoing GOC conversation with family

## 2018-03-24 NOTE — PROGRESS NOTE ADULT - PROBLEM SELECTOR PLAN 3
Continue Aspirin, statin  unable to follow commands to comply with PT/OT  Failed dysphagia, continue NGT for feeds  Aspiration precautions HOB > 45 deg

## 2018-03-24 NOTE — PROGRESS NOTE ADULT - ASSESSMENT
72 y/o M with a h/o DM, CHF, HTN, shingles over the past 3 months, CKD, with acute hypoxemic respiratory failure, seizure, encephalopathy
74 yo male, PMHx DM, HF, HTN, who presented BIBA after being found unresponsive by wife at home, intubated for hypoxemic respiratory failure, r/o acute CVA vs encephalopathy post-ictal state secondary to seizures. Concerns for encephalitis secondary to VZV meningitis in differential, which was negative, Syphilis equivocal, today with possible aspiration, requiring NT suctioning
THIS 73 Y.O. MAN HERE FOR ENCEPHALOPATHY, RECENT HX OF ZOSTER, EPISODES OF SEIZURES, encephalopathy, INFECTIOUS WORKUP IN PROCESS.   VDRL is negative, but Treponema pallidum antibiody is equivocal.
The patient is a 73y Male with seizure.    Continue Keppra.     On antibiotics per MICU team.     Eventual MRI brain
72 y/o M with a h/o DM, CHF, HTN, shingles over the past 3 months, CKD, with acute hypoxemic respiratory failure, seizure, encephalopathy, r/o meningoencephalitis, hyperglycemia.
72 y/o M with a h/o DM, CHF, HTN, shingles over the past 3 months, CKD, with acute hypoxemic respiratory failure, seizure, encephalopathy, r/o meningoencephalitis, hyperglycemia.
72 yo male, PMHx DM, HF, HTN, who presented BIBA after being found unresponsive by wife at home, intubated for hypoxemic respiratory failure, r/o acute CVA vs encephalopathy post-ictal state secondary to seizures. Concerns for encephalitis secondary to VZV meningitis in differential, which was negative, Syphilis equivocal, today with possible aspiration, requiring NT suctioning, new Left PCA infarct.
72 yo male, PMHx DM, HF, HTN, who presented BIBA after being found unresponsive by wife at home, intubated for hypoxemic respiratory failure, r/o acute CVA vs encephalopathy post-ictal state secondary to seizures. Concerns for encephalitis secondary to VZV meningitis in differential, which was negative, Syphilis equivocal, today with possible aspiration, requiring NT suctioning, new Left PCA infarct.
72 yo male, PMHx DM, HF, HTN, who presented BIBA after being found unresponsive by wife at home, intubated for hypoxemic respiratory failure, r/o acute CVA vs encephalopathy post-ictal state secondary to seizures. Concerns for encephalitis secondary to VZV meningitis in differential, which was negative, Syphilis equivocal.
73 yom with DM, HTN, CHF EF 25% presenting with seizure and recent hx of shingles 3 wks ago tx     Rass - -  Cam - +  VAP bundle - NA  GI ppx - NA  DVT ppx - yes  Nutrition -NPO    Invasive Devices: none    Advanced directives: FULL
73M with acute L PCA infarct, persistent encephalopathy, concerns for encephalitis, PLEDs, copious secretions concerns for aspiration, dysphagia with NG tube.
74 y/o M with a h/o DM, CHF, HTN, shingles over the past 3 months, CKD, with acute hypoxemic respiratory failure, seizure, encephalopathy, r/o meningoencephalitis, hyperglycemia.
74 yo male, PMHx DM, HF, HTN, who presented BIBA after being found unresponsive by wife at home, intubated for hypoxemic respiratory failure, r/o acute CVA vs encephalopathy post-ictal state secondary to seizures. Concerns for encephalitis secondary to VZV meningitis in differential, which was negative, Syphilis equivocal, today with possible aspiration, requiring NT suctioning, new Left PCA and internal capsule infarct.
74 yo male, PMHx DM, HF, HTN, who presented BIBA after being found unresponsive by wife at home, intubated for hypoxemic respiratory failure, r/o acute CVA vs encephalopathy post-ictal state secondary to seizures. Concerns for encephalitis secondary to VZV meningitis in differential, which was negative, Syphilis equivocal, today with possible aspiration, requiring NT suctioning, new Left PCA and internal capsule infarct.    Meet with family today - discussed current condition and goals of care currently the patietn is looking at likely trcheostomy and peg and nursing facitily if aggressive care is desired, patietn may have small amout of improvement but he will not go back to the functional status he had prior. Wife and one son both stated he would want to be home and not in a nursing home. At this point they need time to decide best course of action and would like to keep the current full code status
THIS 73 Y.O. MAN HERE FOR ENCEPHALOPATHY, RECENT HX OF ZOSTER, EPISODES OF SEIZURES, encephalopathy, INFECTIOUS WORKUP IN PROCESS.   VDRL is negative, but Treponema pallidum antibiody is equivocal.
THIS 73 Y.O. MAN HERE FOR ENCEPHALOPATHY, RECENT HX OF ZOSTER, EPISODES OF SEIZURES, encephalopathy, INFECTIOUS WORKUP IN PROCESS.   VDRL is negative, but Treponema pallidum antibiody is equivocal.
THIS 73 Y.O. MAN HERE FOR ENCEPHALOPATHY, RECENT HX OF ZOSTER, EPISODES OF SEIZURES, encephalopathy, INFECTIOUS WORKUP IN PROCESS.   VDRL is negative, but Treponema pallidum antibiody is equivocal.   REPEAT TESTING DONE    RESULTS PENDING  WILL FOLLOW UP
THIS 73 Y.O. MAN HERE FOR ENCEPHALOPATHY, RECENT HX OF ZOSTER, EPISODES OF SEIZURES, encephalopathy, INFECTIOUS WORKUP IN PROCESS.   VDRL is negative, but Treponema pallidum antibiody is equivocal.   WOULD SUGGEST REPEAT BOTH
The patient is a 73y Male with AMS possible encephalitis, has zoster  continue abx per ID    will follow with you    Sunil Lara MD PhD   [id number]
The patient is a 73y Male with AMS, question encephalitis and new CVA seen on CT head    antibiotics per primary team  continue keppra and depakote  continue asa and statin    will follow with you    Sunil Lara MD PhD   [id number]
The patient is a 73y Male with MMP and Pleds that are now improved after keppra increased and depacon started    continue current medication,   continue video eeg    will follow with you    Sunil Lara MD PhD   [id number]
The patient is a 73y Male with encephalopathy and CVA  no sig change in neurologic status  continue MICU care    will follow with you    Sunil Lara MD PhD   [id number]
The patient is a 73y Male with encephalopathy and seizure.     EEG monitor still in place.     Will continue to follow.
The patient is a 73y Male with encephalopathy and stroke.     On antibiotics per medical team.     On antiplatelet and statin.     On Keppra and valproic acid.     No recent seizures.
The patient is a 73y Male with encephalopathy of unclear origin, EEG improved o/n, but exam is poorer today  no clear change in clinical status noted to acciount for increased encephalopathy    will continue to follow closely  continue EEG monitoring    will follow with you    Sunil Lara MD PhD   [id number]
The patient is a 73y Male with encephalopathy, seizures, and CVA.     On antiplatelet, statin.   On Keppra.     Continue supportive care.
The patient is a 73y Male with recurrent pleds on right hemisphere    keppra increased to 1500 bid    suggest benzodiazepines to break the PLEDS    will follow with you    Sunil Lara MD PhD   [id number]
The patient is a 73y Male with seizure, possible viral encephalitis.  Neuro has been stable, but not much improvement.  abx per ID    will follow with you    Sunil Lara MD PhD   [id number]
72 y/o M with a h/o DM, CHF, HTN, shingles over the past 3 months, CKD, with acute hypoxemic respiratory failure, seizure, encephalopathy, r/o meningoencephalitis, hyperglycemia
72 y/o M with a h/o DM, CHF, HTN, shingles over the past 3 months, CKD, with acute hypoxemic respiratory failure, seizure, encephalopathy, r/o meningoencephalitis, hyperglycemia.
73 yom with DM, HTN, CHF EF 25% presenting with seizure and recent hx of shingles 3 wks ago tx    Rass - -3  Cam - +  VAP bundle - yes  GI ppx - yes  DVT ppx - yes  Nutrition - TF    Invasive Devices: indwelling critical illness    Advanced directives: FULL
72 y/o M with a h/o DM, CHF, HTN, shingles over the past 3 months, CKD, with acute hypoxemic respiratory failure, seizure, encephalopathy, r/o meningoencephalitis, hyperglycemia.
74 yo male, PMHx DM, HF, HTN, who presented BIBA after being found unresponsive by wife at home, intubated for hypoxemic respiratory failure, r/o acute CVA vs encephalopathy post-ictal state secondary to seizures. Concerns for encephalitis secondary to VZV meningitis in differential, which was negative, Syphilis equivocal, today with possible aspiration, requiring NT suctioning, new Left PCA infarct.
73M with acute L PCA infarct, persistent encephalopathy, concerns for encephalitis, PLEDs, copious secretions concerns for aspiration, dysphagia with NG tube.
73 yom with DM, HTN, CHF EF 25% presenting with seizure and recent hx of shingles 3 wks ago tx    Rass - -1  Cam - +  VAP bundle - NA  GI ppx - NA  DVT ppx - yes  Nutrition -NPO    Invasive Devices: none    Advanced directives: FULL
72 y/o M with a h/o DM, CHF, HTN, shingles over the past 3 months, CKD, with acute hypoxemic respiratory failure, seizure, encephalopathy, r/o meningoencephalitis, hyperglycemia.

## 2018-03-24 NOTE — PROGRESS NOTE ADULT - PROVIDER SPECIALTY LIST ADULT
Critical Care
Infectious Disease
Neurology
Palliative Care
Palliative Care
Critical Care
Neurology
Infectious Disease
Critical Care

## 2018-03-24 NOTE — PROGRESS NOTE ADULT - PROBLEM SELECTOR PROBLEM 4
Type 2 diabetes mellitus with complication, unspecified long term insulin use status
Seizure
Hyperglycemia
Seizure
Acute respiratory failure with hypoxia
Debility
Seizure
Type 2 diabetes mellitus with complication, unspecified long term insulin use status
R/O Meningoencephalitis
Seizure
Type 2 diabetes mellitus with complication, unspecified long term insulin use status
Seizure
Seizure
Encephalopathy, unspecified

## 2018-03-24 NOTE — PROGRESS NOTE ADULT - PROBLEM SELECTOR PROBLEM 5
Hyperglycemia
CKD (chronic kidney disease)
Stage 3 chronic kidney disease
Aspiration pneumonitis
Hyperglycemia
Palliative care encounter
Stage 3 chronic kidney disease
Hyperglycemia
Stage 3 chronic kidney disease
Palliative care encounter

## 2018-03-24 NOTE — PROGRESS NOTE ADULT - PROBLEM SELECTOR PROBLEM 3
Acute respiratory failure with hypoxia
Acute respiratory failure with hypoxia
Seizure
R/O Neurosyphilis
Type 2 diabetes mellitus with complication, unspecified long term insulin use status
Acute respiratory failure with hypoxia
Cerebrovascular accident (CVA), unspecified mechanism
Encephalopathy
R/O Neurosyphilis
Type 2 diabetes mellitus with complication, unspecified long term insulin use status
Acute respiratory failure with hypoxia
Acute respiratory failure with hypoxia
Encephalopathy
Acute respiratory failure with hypoxia
R/O Neurosyphilis
Debility

## 2018-03-24 NOTE — PROGRESS NOTE ADULT - SUBJECTIVE AND OBJECTIVE BOX
Patient is a 73y old  Male who presents with a chief complaint of Unresponsive (10 Mar 2018 05:56)      BRIEF HOSPITAL COURSE: 72 y/o M with a h/o DM, CHF, HTN, CKD, shingles over the past 3 months, who presented BIBA after being found unresponsive by wife at home. Upon arrival to ED, /110, patient was unresponsive to verbal stimuli, and was intubated by Anesthesia for concerns of inability to protect airway and hypoxia. Patient was noted to have L cheek focal twitching, withdrew to noxious stimuli x 4 extremities, however had a deficit on LUE / LLE. Initial CT head negative for acute hemorrhage or infarct, no visible MCA sign. Deemed not a candidate for tPA. Later developed tonic-clonic seizure activity. EEG performed without seizure activity noted. 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 Possible neurosyphilis, testing thus far equivocal. Extubated on 3/11. Hospital course complicated by L PCA and internal capsule infarct.    Events last 24 hours: Remains deeply encephalopathic with copious secretions    PAST MEDICAL & SURGICAL HISTORY:  CHF (congestive heart failure)  HTN (hypertension)  Diabetes  No significant past surgical history      Review of Systems: unable to obtain due to encephalopathy      Medications:  penicillin   G  potassium  IVPB      penicillin   G  potassium  IVPB 3 Million Unit(s) IV Intermittent every 4 hours  carvedilol 6.25 milliGRAM(s) Oral every 12 hours  furosemide    Tablet 40 milliGRAM(s) Oral two times a day  isosorbide   dinitrate Tablet (ISORDIL) 10 milliGRAM(s) Oral three times a day  ALBUTerol    90 MICROgram(s) HFA Inhaler 2 Puff(s) Inhalation every 6 hours PRN  levETIRAcetam  Solution 1500 milliGRAM(s) Oral two times a day  valproic  acid Syrup 500 milliGRAM(s) Oral every 8 hours  aspirin  chewable 81 milliGRAM(s) Oral daily  heparin  Injectable 5000 Unit(s) SubCutaneous every 12 hours  atorvastatin 20 milliGRAM(s) Oral at bedtime  dextrose 50% Injectable 12.5 Gram(s) IV Push once  dextrose 50% Injectable 25 Gram(s) IV Push once  dextrose 50% Injectable 25 Gram(s) IV Push once  dextrose Gel 1 Dose(s) Oral once PRN  finasteride 5 milliGRAM(s) Oral daily  glucagon  Injectable 1 milliGRAM(s) IntraMuscular once PRN  insulin glargine Injectable (LANTUS) 40 Unit(s) SubCutaneous two times a day  insulin lispro (HumaLOG) corrective regimen sliding scale   SubCutaneous every 6 hours  dextrose 5%. 1000 milliLiter(s) IV Continuous <Continuous>  dextrose 5%. 1000 milliLiter(s) IV Continuous <Continuous>      ICU Vital Signs Last 24 Hrs  T(C): 37.2 (24 Mar 2018 00:27), Max: 37.9 (23 Mar 2018 21:17)  T(F): 99 (24 Mar 2018 00:27), Max: 100.3 (23 Mar 2018 21:17)  HR: 100 (24 Mar 2018 01:00) (93 - 112)  BP: 123/65 (24 Mar 2018 01:00) (114/58 - 159/67)  BP(mean): 88 (24 Mar 2018 01:00) (78 - 108)  ABP: --  ABP(mean): --  RR: 21 (24 Mar 2018 01:00) (13 - 36)  SpO2: 100% (24 Mar 2018 01:00) (86% - 100%)          I&O's Detail    22 Mar 2018 07:01  -  23 Mar 2018 07:00  --------------------------------------------------------  IN:    Free Water: 900 mL    Glucerna: 1430 mL    Solution: 175 mL    Solution: 600 mL  Total IN: 3105 mL    OUT:  Total OUT: 0 mL    Total NET: 3105 mL      23 Mar 2018 07:01  -  24 Mar 2018 01:57  --------------------------------------------------------  IN:    Enteral Tube Flush: 100 mL    Free Water: 1250 mL    Glucerna: 1140 mL    Solution: 25 mL    Solution: 400 mL  Total IN: 2915 mL    OUT:  Total OUT: 0 mL    Total NET: 2915 mL            LABS:                        12.8   17.2  )-----------( 366      ( 23 Mar 2018 05:44 )             40.6     03-23    149<H>  |  99  |  84.0<H>  ----------------------------<  255<H>  4.9   |  34.0<H>  |  2.34<H>    Ca    9.6      23 Mar 2018 05:44  Phos  3.2     03-23  Mg     2.6     03-23    TPro  7.4  /  Alb  2.5<L>  /  TBili  0.8  /  DBili  x   /  AST  45<H>  /  ALT  19  /  AlkPhos  118  03-22          CAPILLARY BLOOD GLUCOSE  210 (23 Mar 2018 18:00)      POCT Blood Glucose.: 207 mg/dL (24 Mar 2018 00:28)        CULTURES:  Culture Results:   No growth at 5 days. (03-17 @ 10:21)  Culture Results:   No growth at 5 days. (03-17 @ 10:20)      Physical Examination:    General: Obese male lying in bed in no acute distress.      HEENT: Pupils 3mm equal, reactive to light. Symmetric. NGT in place.    PULM: Ronchi throughout bilaterally, +copious secretions and gurgling of upper airway    CVS: Regular rate and rhythm, no murmurs, rubs, or gallops    ABD: Soft, nondistended, nontender, normoactive bowel sounds, no masses    EXT: Anasarca of bilateral upper extremities; no edema of b/l LE, nontender    SKIN: Warm and well perfused    NEURO: Turns head to verbal stimuli, attempts to verbally communicate at times however mutters incomprehensibly. Does not open eyes to verbal or noxious stimuli. Spontaneous movement of LUE. Grasps and localizes to noxious stimuli with LUE, withdraws to noxious stimuli of LLE, unable to elicit response on RUE/RLE to noxious stimuli. Does not follow commands.    RADIOLOGY: < from: CT Head No Cont (03.23.18 @ 14:56) >  IMPRESSION:  Stable subacute left PCA territory infarcts. Stable   posterior left internal capsule limb infarct. Mild chronic microvascular   changes without evidence of new transcortical infarction or hemorrhage.     RENEE KOCH M.D., ATTENDING RADIOLOGIST  This document has been electronically signed. Mar 23 2018  3:10PM        < end of copied text >      CRITICAL CARE TIME SPENT: I have spent 35 minutes of critical care time evaluating and treating this patient, including reviewing charts, labs, imagining studies, and collaborating with interdisciplinary team.

## 2018-03-25 LAB
RPR SER-TITR: SIGNIFICANT CHANGE UP
RPR SERPL-ACNC: SIGNIFICANT CHANGE UP
T PALLIDUM AB TITR SER: POSITIVE
T PALLIDUM IGG SER QL IF: REACTIVE

## 2018-10-22 NOTE — PROGRESS NOTE ADULT - PROBLEM SELECTOR PLAN 7
Detail Level: Zone Detail Level: Generalized new L PCA infarct on CT, TTE reviewed on appropriate therapy  ASA/Statin  Neurology following

## 2018-11-20 NOTE — DISCHARGE NOTE FOR THE EXPIRED PATIENT - TIME PATIENT WAS PRONOUNCED DEAD
24-Mar-2018 12:15 airway patent/no chest wall tenderness/clear to auscultation bilaterally/no intercostal retractions/no rales/no wheezes/normal/breath sounds equal/respirations non-labored/good air movement/no subcutaneous emphysema/no rhonchi no intercostal retractions/no chest wall tenderness/no rhonchi/airway patent/breath sounds equal/no subcutaneous emphysema/good air movement/respirations non-labored/clear to auscultation bilaterally/no rales/no wheezes

## 2019-05-13 NOTE — PROGRESS NOTE ADULT - SUBJECTIVE AND OBJECTIVE BOX
Patient is a 73y old  Male who presents with a chief complaint of Unresponsive (10 Mar 2018 05:56)     BRIEF HOSPITAL COURSE: 72 y/o M with a h/o DM, CHF, HTN, CKD, shingles over the past 3 months, who presented BIBA after being found unresponsive by wife at home. Upon arrival to ED, /110, patient was unresponsive to verbal stimuli, and was intubated by Anesthesia for concerns of inability to protect airway and hypoxia. Patient was noted to have L cheek focal twitching, withdrew to noxious stimuli x 4 extremities, however had a deficit on LUE / LLE. Initial CT head negative for acute hemorrhage or infarct, no visible MCA sign. Deemed not a candidate for tPA. Later developed tonic-clonic seizure activity. EEG performed without seizure activity noted. Concern for meningoencephalitis, however, LP unremarkable. Extubated on 3/11.    overall slightly worse neurologic exam, less responsive, aspirating    PAST MEDICAL & SURGICAL HISTORY:  CHF (congestive heart failure)  HTN (hypertension)  Diabetes  No significant past surgical history      Review of Systems:  limited      Medications:  penicillin   G  potassium  IVPB      penicillin   G  potassium  IVPB 3 Million Unit(s) IV Intermittent every 4 hours    carvedilol 6.25 milliGRAM(s) Oral every 12 hours  furosemide    Tablet 40 milliGRAM(s) Oral two times a day  isosorbide   dinitrate Tablet (ISORDIL) 10 milliGRAM(s) Oral three times a day    ALBUTerol    90 MICROgram(s) HFA Inhaler 2 Puff(s) Inhalation every 6 hours PRN    levETIRAcetam  Solution 1500 milliGRAM(s) Oral two times a day  valproic  acid Syrup 500 milliGRAM(s) Oral every 8 hours      aspirin  chewable 81 milliGRAM(s) Oral daily  heparin  Injectable 5000 Unit(s) SubCutaneous every 12 hours        atorvastatin 20 milliGRAM(s) Oral at bedtime  dextrose 50% Injectable 12.5 Gram(s) IV Push once  dextrose 50% Injectable 25 Gram(s) IV Push once  dextrose 50% Injectable 25 Gram(s) IV Push once  dextrose Gel 1 Dose(s) Oral once PRN  finasteride 5 milliGRAM(s) Oral daily  glucagon  Injectable 1 milliGRAM(s) IntraMuscular once PRN  insulin glargine Injectable (LANTUS) 40 Unit(s) SubCutaneous two times a day  insulin lispro (HumaLOG) corrective regimen sliding scale   SubCutaneous Before meals and at bedtime    dextrose 5%. 1000 milliLiter(s) IV Continuous <Continuous>                ICU Vital Signs Last 24 Hrs  T(C): 36.9 (23 Mar 2018 16:00), Max: 37.7 (23 Mar 2018 04:00)  T(F): 98.4 (23 Mar 2018 16:00), Max: 99.8 (23 Mar 2018 04:00)  HR: 95 (23 Mar 2018 19:00) (91 - 112)  BP: 118/64 (23 Mar 2018 19:00) (118/64 - 159/67)  BP(mean): 85 (23 Mar 2018 19:00) (83 - 108)  ABP: --  ABP(mean): --  RR: 18 (23 Mar 2018 19:00) (13 - 38)  SpO2: 97% (23 Mar 2018 19:00) (90% - 100%)          I&O's Detail    22 Mar 2018 07:01  -  23 Mar 2018 07:00  --------------------------------------------------------  IN:    Free Water: 900 mL    Glucerna: 1430 mL    Solution: 175 mL    Solution: 600 mL  Total IN: 3105 mL    OUT:  Total OUT: 0 mL    Total NET: 3105 mL      23 Mar 2018 07:01  -  23 Mar 2018 20:07  --------------------------------------------------------  IN:    Enteral Tube Flush: 100 mL    Free Water: 750 mL    Glucerna: 720 mL    Solution: 25 mL    Solution: 200 mL  Total IN: 1795 mL    OUT:  Total OUT: 0 mL    Total NET: 1795 mL            LABS:                        12.8   17.2  )-----------( 366      ( 23 Mar 2018 05:44 )             40.6     03-23    149<H>  |  99  |  84.0<H>  ----------------------------<  255<H>  4.9   |  34.0<H>  |  2.34<H>    Ca    9.6      23 Mar 2018 05:44  Phos  3.2     03-23  Mg     2.6     03-23    TPro  7.4  /  Alb  2.5<L>  /  TBili  0.8  /  DBili  x   /  AST  45<H>  /  ALT  19  /  AlkPhos  118  03-22          CAPILLARY BLOOD GLUCOSE  210 (23 Mar 2018 18:00)      POCT Blood Glucose.: 210 mg/dL (23 Mar 2018 17:28)        CULTURES:  Culture Results:   No growth at 5 days. (03-17-18 @ 10:21)  Culture Results:   No growth at 5 days. (03-17-18 @ 10:20)    Physical Examination:    General: No acute distress.  will mumble occasionally    HEENT: Pupils equal, reactive to light.  Symmetric.    PULM: course and gurgly    CVS: Regular rate and rhythm, no murmurs, rubs, or gallops    ABD: Soft, nondistended, nontender, normoactive bowel sounds, no masses    EXT: No edema, nontender    SKIN: Warm and well perfused, no rashes noted.    NEURO: right sided hemiparesis and facial droop    CRITICAL CARE TIME SPENT: *** Patient is a 73y old  Male who presents with a chief complaint of Unresponsive (10 Mar 2018 05:56)     BRIEF HOSPITAL COURSE: 72 y/o M with a h/o DM, CHF, HTN, CKD, shingles over the past 3 months, who presented BIBA after being found unresponsive by wife at home. Upon arrival to ED, /110, patient was unresponsive to verbal stimuli, and was intubated by Anesthesia for concerns of inability to protect airway and hypoxia. Patient was noted to have L cheek focal twitching, withdrew to noxious stimuli x 4 extremities, however had a deficit on LUE / LLE. Initial CT head negative for acute hemorrhage or infarct, no visible MCA sign. Deemed not a candidate for tPA. Later developed tonic-clonic seizure activity. EEG performed without seizure activity noted. Concern for meningoencephalitis, however, LP unremarkable. Extubated on 3/11.    overall slightly worse neurologic exam, less responsive, aspirating    PAST MEDICAL & SURGICAL HISTORY:  CHF (congestive heart failure)  HTN (hypertension)  Diabetes  No significant past surgical history      Review of Systems:  limited      Medications:  penicillin   G  potassium  IVPB      penicillin   G  potassium  IVPB 3 Million Unit(s) IV Intermittent every 4 hours    carvedilol 6.25 milliGRAM(s) Oral every 12 hours  furosemide    Tablet 40 milliGRAM(s) Oral two times a day  isosorbide   dinitrate Tablet (ISORDIL) 10 milliGRAM(s) Oral three times a day    ALBUTerol    90 MICROgram(s) HFA Inhaler 2 Puff(s) Inhalation every 6 hours PRN    levETIRAcetam  Solution 1500 milliGRAM(s) Oral two times a day  valproic  acid Syrup 500 milliGRAM(s) Oral every 8 hours      aspirin  chewable 81 milliGRAM(s) Oral daily  heparin  Injectable 5000 Unit(s) SubCutaneous every 12 hours        atorvastatin 20 milliGRAM(s) Oral at bedtime  dextrose 50% Injectable 12.5 Gram(s) IV Push once  dextrose 50% Injectable 25 Gram(s) IV Push once  dextrose 50% Injectable 25 Gram(s) IV Push once  dextrose Gel 1 Dose(s) Oral once PRN  finasteride 5 milliGRAM(s) Oral daily  glucagon  Injectable 1 milliGRAM(s) IntraMuscular once PRN  insulin glargine Injectable (LANTUS) 40 Unit(s) SubCutaneous two times a day  insulin lispro (HumaLOG) corrective regimen sliding scale   SubCutaneous Before meals and at bedtime    dextrose 5%. 1000 milliLiter(s) IV Continuous <Continuous>                ICU Vital Signs Last 24 Hrs  T(C): 36.9 (23 Mar 2018 16:00), Max: 37.7 (23 Mar 2018 04:00)  T(F): 98.4 (23 Mar 2018 16:00), Max: 99.8 (23 Mar 2018 04:00)  HR: 95 (23 Mar 2018 19:00) (91 - 112)  BP: 118/64 (23 Mar 2018 19:00) (118/64 - 159/67)  BP(mean): 85 (23 Mar 2018 19:00) (83 - 108)  ABP: --  ABP(mean): --  RR: 18 (23 Mar 2018 19:00) (13 - 38)  SpO2: 97% (23 Mar 2018 19:00) (90% - 100%)          I&O's Detail    22 Mar 2018 07:01  -  23 Mar 2018 07:00  --------------------------------------------------------  IN:    Free Water: 900 mL    Glucerna: 1430 mL    Solution: 175 mL    Solution: 600 mL  Total IN: 3105 mL    OUT:  Total OUT: 0 mL    Total NET: 3105 mL      23 Mar 2018 07:01  -  23 Mar 2018 20:07  --------------------------------------------------------  IN:    Enteral Tube Flush: 100 mL    Free Water: 750 mL    Glucerna: 720 mL    Solution: 25 mL    Solution: 200 mL  Total IN: 1795 mL    OUT:  Total OUT: 0 mL    Total NET: 1795 mL            LABS:                        12.8   17.2  )-----------( 366      ( 23 Mar 2018 05:44 )             40.6     03-23    149<H>  |  99  |  84.0<H>  ----------------------------<  255<H>  4.9   |  34.0<H>  |  2.34<H>    Ca    9.6      23 Mar 2018 05:44  Phos  3.2     03-23  Mg     2.6     03-23    TPro  7.4  /  Alb  2.5<L>  /  TBili  0.8  /  DBili  x   /  AST  45<H>  /  ALT  19  /  AlkPhos  118  03-22          CAPILLARY BLOOD GLUCOSE  210 (23 Mar 2018 18:00)      POCT Blood Glucose.: 210 mg/dL (23 Mar 2018 17:28)        CULTURES:  Culture Results:   No growth at 5 days. (03-17-18 @ 10:21)  Culture Results:   No growth at 5 days. (03-17-18 @ 10:20)    Physical Examination:    General: No acute distress.  will mumble occasionally    HEENT: Pupils equal, reactive to light.  Symmetric.    PULM: course and gurgly    CVS: Regular rate and rhythm, no murmurs, rubs, or gallops    ABD: Soft, nondistended, nontender, normoactive bowel sounds, no masses    EXT: No edema, nontender    SKIN: Warm and well perfused, no rashes noted.    NEURO: right sided hemiparesis and facial droop    CRITICAL CARE TIME SPENT: 50 min 0

## 2020-02-10 NOTE — H&P ADULT - NS MD HP PULSE RADIAL

## 2021-03-02 NOTE — DIETITIAN INITIAL EVALUATION ADULT. - PROBLEM SELECTOR PROBLEM 4
PATIENT INFORMATION    Immunizations Given:  Your child received routine vaccinations today.    Common side effects include: low grade fever, pain, redness or swelling at the injection site.  Redness and swelling usually last 2 to 3 days and can be about the size of a half dollar.  This is usually helped by cool compresses applied to the area. Some bleeding is not unusual.  You may leave the bandages in place until you arrive home.    Call the office if you have any concerns or questions.      Common dosing for acetaminophen and ibuprofen:   Acetaminophen (Tylenol) can be given every 4 hours.  · Infant Drops and Children's Elixir: 160mg/5ml for  Weight 6-11 lbs 12-17 lbs 18-23 lbs 24-35 lbs   Dose 1.25 ml 2.5 ml 3.75 ml 5 ml      Weight 24-35 lbs 36-47 lbs 48-59 lbs 60-71 lsb 72-95 lbs   Dose 5 ml 7.5 ml 10 ml 12.5 ml 15 ml       Ibuprofen (Motrin) can be given every 6 hours. Safe for children 6 months and older.  · Infant Drops: 50mg/1.25ml  Weight 12-17 lbs 18-23 lbs   Dose 1.25 ml 1.875 ml     · Children's Elixir 100mg/5ml   Weight 12-17 lbs 18-23 lbs 24-35 lbs 36-47 lbs   Dose 2.5 ml 3.75 ml 5 ml 7.5 ml     Weight 48-59 lbs 60-71 lbs 72-95 lbs   Dose 10 ml 12.5 15 ml       Follow-Up  - Return in one year for your next complete exam.    
Essential hypertension

## 2021-05-21 NOTE — ED ADULT TRIAGE NOTE - RESPIRATORY RATE (BREATHS/MIN)
Problem: Falls - Risk of  Goal: *Absence of Falls  Description: Document Onalee Gum Fall Risk and appropriate interventions in the flowsheet. Outcome: Progressing Towards Goal  Note: Fall Risk Interventions:  Mobility Interventions: Bed/chair exit alarm    Mentation Interventions: Adequate sleep, hydration, pain control    Medication Interventions: Evaluate medications/consider consulting pharmacy    Elimination Interventions: Bed/chair exit alarm    History of Falls Interventions: Bed/chair exit alarm         Problem: Patient Education: Go to Patient Education Activity  Goal: Patient/Family Education  Outcome: Progressing Towards Goal     Problem: Pressure Injury - Risk of  Goal: *Prevention of pressure injury  Description: Document Chevy Scale and appropriate interventions in the flowsheet.   Outcome: Progressing Towards Goal  Note: Pressure Injury Interventions:       Moisture Interventions: Absorbent underpads    Activity Interventions: Increase time out of bed    Mobility Interventions: HOB 30 degrees or less    Nutrition Interventions: Document food/fluid/supplement intake    Friction and Shear Interventions: Minimize layers                Problem: Patient Education: Go to Patient Education Activity  Goal: Patient/Family Education  Outcome: Progressing Towards Goal     Problem: Patient Education: Go to Patient Education Activity  Goal: Patient/Family Education  Outcome: Progressing Towards Goal     Problem: Patient Education: Go to Patient Education Activity  Goal: Patient/Family Education  Outcome: Progressing Towards Goal 16

## 2022-04-19 NOTE — ED ADULT NURSE NOTE - ED NEURO NIH ASSESS
IVSEDATION POST SURGERY INSTRUCTIONS    1.  Rest at home (not necessarily in bed) for 24 hours following anesthesia.  You  may feel sleepy for the next 24 hours.  Do not drive an automobile, operate heavy machinery, or make important decisions.    2.  You may start with clear liquids increasing to a light diet on the day of surgery.  You may then resume your normal diet.  Do not drink alcoholic beverages for 24 hours.  If nausea occurs, limit diet to clear liquids (soda, tea, broth, Jell-O).  If nausea continues for more than 12 hours, call your doctor.    3.  If nothing was prescribed, you may take a non-prescription non-aspirin containing pain medicine such as Tylenol, Advil, etc.  If pain is not relieved, call your doctor.    4.  Call you doctor if there is excessive:                A. Bleeding or drainage                B. Fever:  101 deg F or higher                C. Swelling or redness    5. You should have a responsible adult with you for a minimum of 6 hours after arrival at home.  This is for your protection and safety.    6.  Avoid smoking for 24 hours following general anesthesia.    7.  Drink plenty of fluids.  If you are unable to urinate by ( 8 hours) or a feeling of pressure occurs, call your surgeon and/or go to the nearest emergency center.    8.  If you have any questions, please call your surgeon.  IF YOU ARE EXPERIENCING A MEDICAL EMERGENCY, PLEASE GO TO YOUR NEAREST EMERGENCY DEPARTMENT.    9.  Other instructions:    Wound care -Remove bandaid in 24 hours     Activity - as tolerated with cane    You may shower: in 24 hours    
baseline

## 2024-01-26 NOTE — ED PROCEDURE NOTE - CPROC ED PATIENT POSITION1
58 year old white female presents today for rupture of left breast implant. Patient had bilateral breast MRI on 12/13/23. She has a history of left breast cancer in 2015 and underwent bilateral mastectomy at that time per Dr. Durand. She did not need any additional treatment, other than the tamoxifen for 5 years. Later in the year, she was able to undergo bilateral breast reconstruction per Dr. Judge (recently relocated to [place]).     supine

## 2024-08-29 NOTE — PROGRESS NOTE ADULT - PROBLEM SELECTOR PLAN 5
NEUROLOGY CONSULTATION NOTE       [facility] NEUROLOGY [place]  [address] [place] [address]  Tel: [phone]  Fax: [phone]  [email or web address]     Ernestine Morales,  1959, MRN [id number]  PCP: Christina Andrew  Date: 2024     ASSESSMENT & PLAN     Visit Diagnosis  Anemia, unspecified type  History of cerebral embolic infarction     H/O left MCA embolic infarction  Left ICA 70 to 99% stenosis  65-year-old female with A-fib, HTN, HLD s/p MVR with a bioprosthetic mitral valve, s/p direct-current cardioversion, Maze procedure and left atrial appendage ligation in , 70 to 99% left ICA stenosis who was referred for ongoing care for CVA, sense of imbalance and dizziness.  Patient had a left MCA embolic infarction resulting in expressive aphasia in .  Subsequently she had direct-current cardioversion and maze procedure on 2017 and was switched to aspirin and continue on atorvastatin.  Most recent LDL checked on 3/26/2024 is 97.  She was seen in the ER for sense of dizziness and had a CTA that showed 70 to 99% left ICA stenosis and saw vascular surgery and is scheduled for endarterectomy next month.  Her sense of dizziness and fuzziness likely is combination of severe left ICA stenosis and blood pressure.  I have recommended:    1.  Keep blood pressure 120-130/80-90.  I have told her to talk to her primary doctor to back off on her blood pressure medication  2.  Continue enteric-coated aspirin 325 mg daily  3.  LDL is 97, target less than 70.  I have increased the dose of Lipitor to 80 mg daily.  Repeat lipid panel after 6 months  4.  Refer to physical therapy for gait evaluation and training.  She was encouraged to use a cane or a walker  5.  Follow-up in 3 months after the left carotid endarterectomy    Thank you again for this referral, please feel free to contact me if you have any questions.    Андрей Burden MD  [facility] [place]UROLOG[facility],  [place]     REASON FOR CONSULTATION Stroke        HISTORY OF PRESENT ILLNESS     We have been requested by Dr. Douglass to evaluate Ernestine Morales who is a 65 year old  female for history of left MCA embolic infarction    Patient is a 65-year-old female with history of atrial fibrillation, HTN, HLD s/p MVR with a bioprosthetic mitral valve, s/p direct current cardioversion on 4/17/2017 and a maze procedure and left atrial appendage ligation in 2017 with no recurrent atrial fibrillation who had a CVA in 2017 was referred for ongoing care.  Previously patient was on Coumadin that was switched to aspirin after atrial appendage ligation and maze procedure. Patient was seen in the emergency room on 7/31/2021 with persistent dizziness.  She had a CTA of the head and neck that showed 95% left ICA stenosis.  Subsequently she was seen by vascular surgery and had a carotid ultrasound that confirmed 79 99% left ICA stenosis.  She was seen by vascular surgery and is scheduled for endarterectomy.  According to patient she has been experiencing episodes of dizziness and \"snow\".  She feels her brain feels fuzzy.     PROBLEM LIST   Patient Active Problem List   Diagnosis    Acute pulmonary insufficiency    Acute blood loss anemia    Acute systolic heart failure (H)    Aortic valve sclerosis    Chronic diastolic CHF (congestive heart failure) (H)    Persistent atrial fibrillation (H)    S/P mitral valve replacement    Essential hypertension    Mixed hyperlipidemia    Dyslipidemia    LAE (left atrial enlargement)    MDD (major depressive disorder)    Lt ICA 70-99% stenosis         PAST MEDICAL & SURGICAL HISTORY     Past Medical History:   Patient  has a past medical history of Atrial fibrillation (H), Cancer (H) (11.17.2022), Cerebrovascular accident (H) (01/09/2017), HLD (hyperlipidemia), HTN (hypertension), and Mitral regurgitation.    Surgical History:  She  has a past surgical history that includes Replace valve mitral " "(06/09/2017) and biopsy (11.18.2022).     SOCIAL HISTORY     Reviewed, and she  reports that she has been smoking cigarettes. She started smoking about 40 years ago. She has a 34.9 pack-year smoking history. She does not have any smokeless tobacco history on file. She reports that she does not currently use alcohol. She reports that she does not use drugs.     FAMILY HISTORY     Reviewed, and family history includes Hypertension in her mother.     ALLERGIES     No Known Allergies      REVIEW OF SYSTEMS     A 12 point review of system was performed and was negative except as outlined in the history of present illness.     HOME MEDICATIONS     Current Outpatient Rx   Medication Sig Dispense Refill    amLODIPine (NORVASC) 5 MG tablet Take 1 tablet (5 mg) by mouth daily 90 tablet 3    aspirin (ASA) 325 MG EC tablet Take 325 mg by mouth daily      atorvastatin (LIPITOR) 80 MG tablet Take 1 tablet (80 mg) by mouth daily. 90 tablet 3    gabapentin (NEURONTIN) 300 MG capsule Take 1 capsule (300 mg) by mouth 2 times daily 180 capsule 1    losartan (COZAAR) 100 MG tablet Take 1 tablet (100 mg) by mouth daily 90 tablet 3    metoprolol tartrate (LOPRESSOR) 25 MG tablet Take 1 tablet (25 mg) by mouth 2 times daily 180 tablet 3         PHYSICAL EXAM     Vital signs  BP 96/51 (BP Location: Right arm, Patient Position: Sitting)   Pulse 58   Ht 1.549 m (5' 1\")   Wt 41.7 kg (92 lb)   BMI 17.38 kg/m      Weight:   92 lbs 0 oz    Middle-age female who is alert and oriented vital signs are reviewed and documented in electronic medical record.  Neck supple.  She has expressive aphasia.  Strength on the right 4+/5 on the left 5/5 reflexes are 3+ with upgoing toe.  She has minimal dysmetria on finger-nose testing.  She has a wide-based gait and is quite unstable     PERTINENT DIAGNOSTIC STUDIES     Following studies were reviewed:     CTA HEAD AND NECK 7/31/2024  HEAD CT:  1.  Chronic left MCA territory frontal, temporal and insular " encephalomalacia.     2.  No evidence for acute infarct.     3.  No mass or acute hemorrhage     HEAD CTA:   1.  Attenuated distal left MCA branches correlating with known large territory chronic infarct     2.  No evidence for acute large vessel occlusion.     3.  Negative for aneurysm or vascular malformation.     4.  Mild cavernous segment stenoses bilaterally.     NECK CTA:  1.  Extremely high-grade stenosis of the left internal carotid artery origin. 95% narrowing.     2.  Aortic arch great vessel origins stenoses as above.     3.  No evidence for carotid or vertebral dissection.    MRI BRAIN 7/31/2024  1. Extensive remote ischemic findings particularly in the left MCA distribution. There is no evidence of restricted diffusion to suggest acute ischemia on the current examination.     CAROTID ULTRASOUND 8/19/2024  1. Normal diameter of the right ICA relative to the proximal ICA diameter.  2. 70-99% diameter stenosis of the left ICA relative to the proximal ICA diameter.  Evaluation based on SRU Consensus Conference Criteria for Internal Carotid Artery Stenosis for Implementation in [facility]-Accredited [facility] Laboratories     PERTINENT LABS  Following labs were reviewed:  Office Visit on 08/26/2024   Component Date Value Ref Range Status    Sodium 08/26/2024 132 (L)  135 - 145 mmol/L Final    Potassium 08/26/2024 4.5  3.4 - 5.3 mmol/L Final    Chloride 08/26/2024 99  98 - 107 mmol/L Final    Carbon Dioxide (CO2) 08/26/2024 23  22 - 29 mmol/L Final    Anion Gap 08/26/2024 10  7 - 15 mmol/L Final    Urea Nitrogen 08/26/2024 30.4 (H)  8.0 - 23.0 mg/dL Final    Creatinine 08/26/2024 1.51 (H)  0.51 - 0.95 mg/dL Final    GFR Estimate 08/26/2024 38 (L)  >60 mL/min/1.73m2 Final    Calcium 08/26/2024 9.4  8.8 - 10.4 mg/dL Final    Glucose 08/26/2024 91  70 - 99 mg/dL Final    Patient Fasting > 8hrs? 08/26/2024 Unknown   Final    WBC Count 08/26/2024 8.3  4.0 - 11.0 10e3/uL Final    RBC Count 08/26/2024 3.59 (L)  3.80 - 5.20  10e6/uL Final    Hemoglobin 08/26/2024 11.0 (L)  11.7 - 15.7 g/dL Final    Hematocrit 08/26/2024 33.1 (L)  35.0 - 47.0 % Final    MCV 08/26/2024 92  78 - 100 fL Final    MCH 08/26/2024 30.6  26.5 - 33.0 pg Final    MCHC 08/26/2024 33.2  31.5 - 36.5 g/dL Final    RDW 08/26/2024 13.4  10.0 - 15.0 % Final    Platelet Count 08/26/2024 258  150 - 450 10e3/uL Final    Cholesterol 08/26/2024 166  <200 mg/dL Final    Triglycerides 08/26/2024 80  <150 mg/dL Final    Direct Measure HDL 08/26/2024 53  >=50 mg/dL Final    LDL Cholesterol Calculated 08/26/2024 97  <=100 mg/dL Final    Non HDL Cholesterol 08/26/2024 113  <130 mg/dL Final    Patient Fasting > 8hrs? 08/26/2024 Unknown   Final    Ferritin 08/26/2024 215  11 - 328 ng/mL Final    Iron 08/26/2024 67  37 - 145 ug/dL Final    Iron Binding Capacity 08/26/2024 339  240 - 430 ug/dL Final    Iron Sat Index 08/26/2024 20  15 - 46 % Final   Admission on 07/31/2024, Discharged on 07/31/2024   Component Date Value Ref Range Status    Sodium 07/31/2024 136  135 - 145 mmol/L Final    Potassium 07/31/2024 4.6  3.4 - 5.3 mmol/L Final    Chloride 07/31/2024 102  98 - 107 mmol/L Final    Carbon Dioxide (CO2) 07/31/2024 23  22 - 29 mmol/L Final    Anion Gap 07/31/2024 11  7 - 15 mmol/L Final    Urea Nitrogen 07/31/2024 23.8 (H)  8.0 - 23.0 mg/dL Final    Creatinine 07/31/2024 0.91  0.51 - 0.95 mg/dL Final    GFR Estimate 07/31/2024 70  >60 mL/min/1.73m2 Final    Calcium 07/31/2024 9.8  8.8 - 10.4 mg/dL Final    Glucose 07/31/2024 86  70 - 99 mg/dL Final    Ventricular Rate 07/31/2024 60  BPM Final    Atrial Rate 07/31/2024 60  BPM Final    LA Interval 07/31/2024 146  ms Final    QRS Duration 07/31/2024 84  ms Final    QT 07/31/2024 458  ms Final    QTc 07/31/2024 458  ms Final    P Axis 07/31/2024 53  degrees Final    R AXIS 07/31/2024 50  degrees Final    T Axis 07/31/2024 77  degrees Final    Interpretation ECG 07/31/2024    Final                    Value:Sinus rhythm  Normal ECG  No  previous ECGs available  Confirmed by SEE ED PROVIDER NOTE FOR, ECG INTERPRETATION ([id number]),  MuseAdmin, MuseAdmin ([id number]) on 8/12/2024 11:56:09 AM      Magnesium 07/31/2024 1.9  1.7 - 2.3 mg/dL Final    WBC Count 07/31/2024 7.1  4.0 - 11.0 10e3/uL Final    RBC Count 07/31/2024 4.62  3.80 - 5.20 10e6/uL Final    Hemoglobin 07/31/2024 14.0  11.7 - 15.7 g/dL Final    Hematocrit 07/31/2024 41.6  35.0 - 47.0 % Final    MCV 07/31/2024 90  78 - 100 fL Final    MCH 07/31/2024 30.3  26.5 - 33.0 pg Final    MCHC 07/31/2024 33.7  31.5 - 36.5 g/dL Final    RDW 07/31/2024 12.7  10.0 - 15.0 % Final    Platelet Count 07/31/2024 223  150 - 450 10e3/uL Final    % Neutrophils 07/31/2024 70  % Final    % Lymphocytes 07/31/2024 23  % Final    % Monocytes 07/31/2024 5  % Final    % Eosinophils 07/31/2024 0  % Final    % Basophils 07/31/2024 1  % Final    % Immature Granulocytes 07/31/2024 0  % Final    NRBCs per 100 WBC 07/31/2024 0  <1 /100 Final    Absolute Neutrophils 07/31/2024 5.0  1.6 - 8.3 10e3/uL Final    Absolute Lymphocytes 07/31/2024 1.6  0.8 - 5.3 10e3/uL Final    Absolute Monocytes 07/31/2024 0.4  0.0 - 1.3 10e3/uL Final    Absolute Eosinophils 07/31/2024 0.0  0.0 - 0.7 10e3/uL Final    Absolute Basophils 07/31/2024 0.1  0.0 - 0.2 10e3/uL Final    Absolute Immature Granulocytes 07/31/2024 0.0  <=0.4 10e3/uL Final    Absolute NRBCs 07/31/2024 0.0  10e3/uL Final    Hold Specimen 07/31/2024 [facility]   Final    Hold Specimen 07/31/2024 [facility]   Final    Color Urine 07/31/2024 Light Yellow  Colorless, Straw, Light Yellow, Yellow Final    Appearance Urine 07/31/2024 Clear  Clear Final    Glucose Urine 07/31/2024 Negative  Negative mg/dL Final    Bilirubin Urine 07/31/2024 Negative  Negative Final    Ketones Urine 07/31/2024 20 (A)  Negative mg/dL Final    Specific Gravity Urine 07/31/2024 1.010  1.001 - 1.030 Final    Blood Urine 07/31/2024 0.06 mg/dL (A)  Negative Final    pH Urine 07/31/2024 5.5  5.0 - 7.0 Final    Protein Albumin  Urine 07/31/2024 10 (A)  Negative mg/dL Final    Urobilinogen Urine 07/31/2024 <2.0  <2.0 mg/dL Final    Nitrite Urine 07/31/2024 Negative  Negative Final    Leukocyte Esterase Urine 07/31/2024 75 Mickey/uL (A)  Negative Final    Bacteria Urine 07/31/2024 Few (A)  None Seen /HPF Final    Mucus Urine 07/31/2024 Present (A)  None Seen /LPF Final    RBC Urine 07/31/2024 4 (H)  <=2 /HPF Final    WBC Urine 07/31/2024 5  <=5 /HPF Final    Squamous Epithelials Urine 07/31/2024 5 (H)  <=1 /HPF Final    Hyaline Casts Urine 07/31/2024 4 (H)  <=2 /LPF Final        Total time spent for face to face visit, reviewing labs/imaging studies, counseling and coordination of care was: 1 Hour spent on the date of the encounter doing chart review, review of outside records, review of test results, interpretation of tests, patient visit, and documentation     The longitudinal plan of care for the diagnosis(es)/condition(s) as documented were addressed during this visit. Due to the added complexity in care, I will continue to support Talya in the subsequent management and with ongoing continuity of care.    This note was dictated using voice recognition software.  Any grammatical or context distortions are unintentional and inherent to the software.    Orders Placed This Encounter   Procedures    Physical Therapy  Referral      New Prescriptions    No medications on file      Modified Medications    Modified Medication Previous Medication    ATORVASTATIN (LIPITOR) 80 MG TABLET atorvastatin (LIPITOR) 40 MG tablet       Take 1 tablet (80 mg) by mouth daily.    Take 1 tablet (40 mg) by mouth daily             Cr at baseline, euvolemic  Continue to monitor renal function and electrolytes  Replace electrolytes as needed  Monitor I&Os, daily weights  Renally dose meds  Avoid nephrotoxic agents

## 2025-04-15 NOTE — DIETITIAN INITIAL EVALUATION ADULT. - EST PROTEIN NEEDS4
DC instructions given to patient and patients . Patient snored and slept during entire instructions. When patient awake- patient retorts back at nurse saying she heard everything and wants to go home.      verbalizes understanding of instructions.    Patient able to ambulate with assistance to wheelchair to DC home.    86.1